# Patient Record
Sex: MALE | Race: BLACK OR AFRICAN AMERICAN | NOT HISPANIC OR LATINO | URBAN - METROPOLITAN AREA
[De-identification: names, ages, dates, MRNs, and addresses within clinical notes are randomized per-mention and may not be internally consistent; named-entity substitution may affect disease eponyms.]

---

## 2020-08-28 ENCOUNTER — INPATIENT (INPATIENT)
Facility: HOSPITAL | Age: 50
LOS: 7 days | Discharge: HOME | DRG: 871 | End: 2020-09-05
Attending: INTERNAL MEDICINE | Admitting: INTERNAL MEDICINE
Payer: COMMERCIAL

## 2020-08-28 VITALS
HEIGHT: 68 IN | SYSTOLIC BLOOD PRESSURE: 152 MMHG | HEART RATE: 101 BPM | OXYGEN SATURATION: 96 % | DIASTOLIC BLOOD PRESSURE: 95 MMHG | TEMPERATURE: 99 F | WEIGHT: 315 LBS | RESPIRATION RATE: 22 BRPM

## 2020-08-28 LAB
ALBUMIN SERPL ELPH-MCNC: 3.9 G/DL — SIGNIFICANT CHANGE UP (ref 3.3–5)
ALP SERPL-CCNC: 84 U/L — SIGNIFICANT CHANGE UP (ref 40–120)
ALT FLD-CCNC: 40 U/L — SIGNIFICANT CHANGE UP (ref 10–45)
ANION GAP SERPL CALC-SCNC: 13 MMOL/L — SIGNIFICANT CHANGE UP (ref 5–17)
ANISOCYTOSIS BLD QL: SIGNIFICANT CHANGE UP
APPEARANCE UR: CLEAR — SIGNIFICANT CHANGE UP
APTT BLD: 27.1 SEC — LOW (ref 27.5–35.5)
AST SERPL-CCNC: 27 U/L — SIGNIFICANT CHANGE UP (ref 10–40)
BACTERIA # UR AUTO: PRESENT /HPF
BASE EXCESS BLDV CALC-SCNC: 3 MMOL/L — SIGNIFICANT CHANGE UP
BASOPHILS # BLD AUTO: 0 K/UL — SIGNIFICANT CHANGE UP (ref 0–0.2)
BASOPHILS NFR BLD AUTO: 0 % — SIGNIFICANT CHANGE UP (ref 0–2)
BILIRUB SERPL-MCNC: 1 MG/DL — SIGNIFICANT CHANGE UP (ref 0.2–1.2)
BILIRUB UR-MCNC: NEGATIVE — SIGNIFICANT CHANGE UP
BUN SERPL-MCNC: 17 MG/DL — SIGNIFICANT CHANGE UP (ref 7–23)
CA-I SERPL-SCNC: 1.11 MMOL/L — LOW (ref 1.12–1.3)
CALCIUM SERPL-MCNC: 9.6 MG/DL — SIGNIFICANT CHANGE UP (ref 8.4–10.5)
CHLORIDE SERPL-SCNC: 87 MMOL/L — LOW (ref 96–108)
CK SERPL-CCNC: 155 U/L — SIGNIFICANT CHANGE UP (ref 30–200)
CO2 SERPL-SCNC: 29 MMOL/L — SIGNIFICANT CHANGE UP (ref 22–31)
COLOR SPEC: YELLOW — SIGNIFICANT CHANGE UP
CREAT SERPL-MCNC: 0.98 MG/DL — SIGNIFICANT CHANGE UP (ref 0.5–1.3)
CRP SERPL-MCNC: 11.88 MG/DL — HIGH (ref 0–0.4)
D DIMER BLD IA.RAPID-MCNC: 248 NG/ML DDU — HIGH
DIFF PNL FLD: ABNORMAL
EOSINOPHIL # BLD AUTO: 0 K/UL — SIGNIFICANT CHANGE UP (ref 0–0.5)
EOSINOPHIL NFR BLD AUTO: 0 % — SIGNIFICANT CHANGE UP (ref 0–6)
EPI CELLS # UR: SIGNIFICANT CHANGE UP /HPF (ref 0–5)
FERRITIN SERPL-MCNC: 80 NG/ML — SIGNIFICANT CHANGE UP (ref 30–400)
FIBRINOGEN PPP-MCNC: 810 MG/DL — HIGH (ref 258–438)
GAS PNL BLDV: 129 MMOL/L — LOW (ref 138–146)
GAS PNL BLDV: SIGNIFICANT CHANGE UP
GAS PNL BLDV: SIGNIFICANT CHANGE UP
GIANT PLATELETS BLD QL SMEAR: PRESENT — SIGNIFICANT CHANGE UP
GLUCOSE SERPL-MCNC: 151 MG/DL — HIGH (ref 70–99)
GLUCOSE UR QL: NEGATIVE — SIGNIFICANT CHANGE UP
HCO3 BLDV-SCNC: 30 MMOL/L — HIGH (ref 20–27)
HCT VFR BLD CALC: 36 % — LOW (ref 39–50)
HGB BLD-MCNC: 10.6 G/DL — LOW (ref 13–17)
HIV 1+2 AB+HIV1 P24 AG SERPL QL IA: SIGNIFICANT CHANGE UP
HYALINE CASTS # UR AUTO: SIGNIFICANT CHANGE UP /LPF (ref 0–2)
HYPOCHROMIA BLD QL: SLIGHT — SIGNIFICANT CHANGE UP
INR BLD: 1.33 — HIGH (ref 0.88–1.16)
KETONES UR-MCNC: NEGATIVE — SIGNIFICANT CHANGE UP
LACTATE SERPL-SCNC: 1.8 MMOL/L — SIGNIFICANT CHANGE UP (ref 0.5–2)
LDH SERPL L TO P-CCNC: 248 U/L — HIGH (ref 50–242)
LEGIONELLA AG UR QL: NEGATIVE — SIGNIFICANT CHANGE UP
LEUKOCYTE ESTERASE UR-ACNC: NEGATIVE — SIGNIFICANT CHANGE UP
LYMPHOCYTES # BLD AUTO: 1.2 K/UL — SIGNIFICANT CHANGE UP (ref 1–3.3)
LYMPHOCYTES # BLD AUTO: 6.1 % — LOW (ref 13–44)
MANUAL SMEAR VERIFICATION: SIGNIFICANT CHANGE UP
MCHC RBC-ENTMCNC: 20.3 PG — LOW (ref 27–34)
MCHC RBC-ENTMCNC: 29.4 GM/DL — LOW (ref 32–36)
MCV RBC AUTO: 68.8 FL — LOW (ref 80–100)
MICROCYTES BLD QL: SLIGHT — SIGNIFICANT CHANGE UP
MONOCYTES # BLD AUTO: 1.03 K/UL — HIGH (ref 0–0.9)
MONOCYTES NFR BLD AUTO: 5.2 % — SIGNIFICANT CHANGE UP (ref 2–14)
NEUTROPHILS # BLD AUTO: 17.5 K/UL — HIGH (ref 1.8–7.4)
NEUTROPHILS NFR BLD AUTO: 88.7 % — HIGH (ref 43–77)
NITRITE UR-MCNC: NEGATIVE — SIGNIFICANT CHANGE UP
NT-PROBNP SERPL-SCNC: 2598 PG/ML — HIGH (ref 0–300)
OSMOLALITY SERPL: 274 MOSM/KG — LOW (ref 275–300)
OSMOLALITY UR: 464 MOSM/KG — SIGNIFICANT CHANGE UP (ref 300–900)
OVALOCYTES BLD QL SMEAR: SLIGHT — SIGNIFICANT CHANGE UP
PCO2 BLDV: 59 MMHG — HIGH (ref 41–51)
PH BLDV: 7.33 — SIGNIFICANT CHANGE UP (ref 7.32–7.43)
PH UR: 6 — SIGNIFICANT CHANGE UP (ref 5–8)
PLAT MORPH BLD: ABNORMAL
PLATELET # BLD AUTO: 431 K/UL — HIGH (ref 150–400)
PO2 BLDV: 48 MMHG — SIGNIFICANT CHANGE UP
POLYCHROMASIA BLD QL SMEAR: SLIGHT — SIGNIFICANT CHANGE UP
POTASSIUM BLDV-SCNC: 3.3 MMOL/L — LOW (ref 3.5–4.9)
POTASSIUM SERPL-MCNC: 3.6 MMOL/L — SIGNIFICANT CHANGE UP (ref 3.5–5.3)
POTASSIUM SERPL-SCNC: 3.6 MMOL/L — SIGNIFICANT CHANGE UP (ref 3.5–5.3)
PROCALCITONIN SERPL-MCNC: 0.12 NG/ML — HIGH (ref 0.02–0.1)
PROT SERPL-MCNC: 7.8 G/DL — SIGNIFICANT CHANGE UP (ref 6–8.3)
PROT UR-MCNC: 100 MG/DL
PROTHROM AB SERPL-ACNC: 15.7 SEC — HIGH (ref 10.6–13.6)
RAPID RVP RESULT: SIGNIFICANT CHANGE UP
RBC # BLD: 5.23 M/UL — SIGNIFICANT CHANGE UP (ref 4.2–5.8)
RBC # FLD: 20.5 % — HIGH (ref 10.3–14.5)
RBC BLD AUTO: ABNORMAL
RBC CASTS # UR COMP ASSIST: < 5 /HPF — SIGNIFICANT CHANGE UP
SAO2 % BLDV: 69 % — SIGNIFICANT CHANGE UP
SARS-COV-2 RNA SPEC QL NAA+PROBE: SIGNIFICANT CHANGE UP
SODIUM SERPL-SCNC: 129 MMOL/L — LOW (ref 135–145)
SODIUM UR-SCNC: 56 MMOL/L — SIGNIFICANT CHANGE UP
SP GR SPEC: 1.02 — SIGNIFICANT CHANGE UP (ref 1–1.03)
SPHEROCYTES BLD QL SMEAR: SLIGHT — SIGNIFICANT CHANGE UP
TROPONIN T SERPL-MCNC: 0.03 NG/ML — HIGH (ref 0–0.01)
UROBILINOGEN FLD QL: 0.2 E.U./DL — SIGNIFICANT CHANGE UP
WBC # BLD: 19.73 K/UL — HIGH (ref 3.8–10.5)
WBC # FLD AUTO: 19.73 K/UL — HIGH (ref 3.8–10.5)
WBC UR QL: ABNORMAL /HPF

## 2020-08-28 PROCEDURE — 93308 TTE F-UP OR LMTD: CPT | Mod: 26

## 2020-08-28 PROCEDURE — 71275 CT ANGIOGRAPHY CHEST: CPT | Mod: 26

## 2020-08-28 PROCEDURE — 71045 X-RAY EXAM CHEST 1 VIEW: CPT | Mod: 26

## 2020-08-28 PROCEDURE — 99291 CRITICAL CARE FIRST HOUR: CPT

## 2020-08-28 PROCEDURE — 99253 IP/OBS CNSLTJ NEW/EST LOW 45: CPT

## 2020-08-28 PROCEDURE — 93010 ELECTROCARDIOGRAM REPORT: CPT

## 2020-08-28 PROCEDURE — 71045 X-RAY EXAM CHEST 1 VIEW: CPT | Mod: 26,77

## 2020-08-28 RX ORDER — ENOXAPARIN SODIUM 100 MG/ML
40 INJECTION SUBCUTANEOUS EVERY 12 HOURS
Refills: 0 | Status: DISCONTINUED | OUTPATIENT
Start: 2020-08-28 | End: 2020-09-02

## 2020-08-28 RX ORDER — ENOXAPARIN SODIUM 100 MG/ML
40 INJECTION SUBCUTANEOUS EVERY 24 HOURS
Refills: 0 | Status: DISCONTINUED | OUTPATIENT
Start: 2020-08-28 | End: 2020-08-28

## 2020-08-28 RX ORDER — AZITHROMYCIN 500 MG/1
500 TABLET, FILM COATED ORAL ONCE
Refills: 0 | Status: COMPLETED | OUTPATIENT
Start: 2020-08-28 | End: 2020-08-28

## 2020-08-28 RX ORDER — FUROSEMIDE 40 MG
40 TABLET ORAL ONCE
Refills: 0 | Status: COMPLETED | OUTPATIENT
Start: 2020-08-28 | End: 2020-08-28

## 2020-08-28 RX ORDER — AZITHROMYCIN 500 MG/1
500 TABLET, FILM COATED ORAL EVERY 24 HOURS
Refills: 0 | Status: COMPLETED | OUTPATIENT
Start: 2020-08-29 | End: 2020-08-31

## 2020-08-28 RX ORDER — ACETAMINOPHEN 500 MG
650 TABLET ORAL ONCE
Refills: 0 | Status: COMPLETED | OUTPATIENT
Start: 2020-08-28 | End: 2020-08-28

## 2020-08-28 RX ORDER — CEFTRIAXONE 500 MG/1
1000 INJECTION, POWDER, FOR SOLUTION INTRAMUSCULAR; INTRAVENOUS EVERY 24 HOURS
Refills: 0 | Status: COMPLETED | OUTPATIENT
Start: 2020-08-29 | End: 2020-09-02

## 2020-08-28 RX ORDER — CEFTRIAXONE 500 MG/1
1000 INJECTION, POWDER, FOR SOLUTION INTRAMUSCULAR; INTRAVENOUS ONCE
Refills: 0 | Status: COMPLETED | OUTPATIENT
Start: 2020-08-28 | End: 2020-08-28

## 2020-08-28 RX ORDER — CHLORHEXIDINE GLUCONATE 213 G/1000ML
1 SOLUTION TOPICAL
Refills: 0 | Status: DISCONTINUED | OUTPATIENT
Start: 2020-08-28 | End: 2020-08-30

## 2020-08-28 RX ADMIN — ENOXAPARIN SODIUM 40 MILLIGRAM(S): 100 INJECTION SUBCUTANEOUS at 19:14

## 2020-08-28 RX ADMIN — CEFTRIAXONE 100 MILLIGRAM(S): 500 INJECTION, POWDER, FOR SOLUTION INTRAMUSCULAR; INTRAVENOUS at 15:02

## 2020-08-28 RX ADMIN — Medication 650 MILLIGRAM(S): at 19:14

## 2020-08-28 RX ADMIN — Medication 40 MILLIGRAM(S): at 19:49

## 2020-08-28 RX ADMIN — AZITHROMYCIN 255 MILLIGRAM(S): 500 TABLET, FILM COATED ORAL at 15:57

## 2020-08-28 RX ADMIN — Medication 650 MILLIGRAM(S): at 14:13

## 2020-08-28 RX ADMIN — CEFTRIAXONE 1000 MILLIGRAM(S): 500 INJECTION, POWDER, FOR SOLUTION INTRAMUSCULAR; INTRAVENOUS at 16:15

## 2020-08-28 RX ADMIN — Medication 40 MILLIGRAM(S): at 16:15

## 2020-08-28 NOTE — CONSULT NOTE ADULT - ASSESSMENT
ASSESSMENT:   50M PMH HTN, cardiac murmur presented initially with acute shortness of breath and noted hypoxia at PCP for which admitted to MICU for respiratory failure 2/2 to PNA- r/o COVID in setting of significant b/l infiltrates, noted pulm edema. Patient on HFNC and undergoing IV diuresis and Tx with IV abx. Cardiology consulted for reduced EF 30-35% in setting of unknown hx of CHF.     PLAN:   # Reduced Ejection fraction 30-35%, Decompensated CHF  Limited TTE (8/28): No significant valvular disease, trace tricuspid regurgitation, PASP 76 mmHg, Right ventricular size cannot be determined with accuracy, RV probably dilated. Right ventricular systolic function is reduced, TAPSE 14.00 mm (normal >=17 mm). RV tissue Doppler S' is 16.00 cm/s (normal >10 cm/s), Left ventricular endocardium is not well visualized. Grossly, left ventricular function appears moderate to severely reduced with a calculated ejection fraction of 30-35% with global hypokinesis, Trivial pericardial effusion  Exam: Mildly dilated neck veins, LE edema and b/l crackles  - Continue with IV diuresis with strict I/O and daily standing weights  - Please trend cardiac enzymes to peak  - Please obtain daily EKGs      Pending discussion with Consult attending and results are final with attending signature.

## 2020-08-28 NOTE — H&P ADULT - HISTORY OF PRESENT ILLNESS
49 y/o M with hx of HTN and chronic LE edema , and cardiac murmur (since childhood), that presents from his outpatient PCP office for hypoxia and SOB. Pt states for the past 1-2 weeks he has been experiencing anxiety which has resulted in insomnia. He reached out this sister/cousin who is a nurse who recommended to take Benadryl to help with sleep. Pt ingested benadryl on 8/26 and states the following day developed SOB , increased anxiety, and trouble breathing. Pt denies any tobacco, vape, or drug use. States he has no pets at home. Has been quarantining during the pandemic and has no sick contacts. 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 Of note pt states he tried CBD oil 2 weeks ago which was a droplet on tongue but hasnt taken it in 1 week and takes supplements of yisel, turmeric, and moringa. Otherwise Denies any CP, abdominal pain,  N/V, Diarrhea, F, or chills.     In the ED T: 100.1 Rectal HR: 101-> 95 BP: 152/95 RR: 22 Saturating 79% on RA and 96% on NRB . Labs remarkable for WBC 19.73, Hgb 10.6, Plt 431, Na 129, Cl 87, CRP 11.88,  Procalcitonin .12, Trop .03, proBNP > 2000, . VBG demonstrating CXR demonstrating bilateral pulmonary infiltrate/edema. ICU consulted for hypoxic respiratory failure and r/o COVID.     PMH: HTN, Cardiac Murmur

## 2020-08-28 NOTE — ED ADULT TRIAGE NOTE - OTHER COMPLAINTS
SOB, mild cough, SUE, palpitations since wednesday. [id number] room air, on nonrebreather. sent in by [facility]. given 1 aspirin and nitro via ema.

## 2020-08-28 NOTE — ED PROVIDER NOTE - CARE PLAN
Principal Discharge DX:	Pneumonia  Secondary Diagnosis:	Hypoxia Principal Discharge DX:	Shortness of breath  Secondary Diagnosis:	Hypoxia

## 2020-08-28 NOTE — ED ADULT NURSE NOTE - OTHER COMPLAINTS
SOB, mild cough, SUE, palpitations since wednesday. [id number] room air, on nonrebreather. sent in by [facility]. given 1 aspirin and nitro via ema.

## 2020-08-28 NOTE — ED PROVIDER NOTE - CLINICAL SUMMARY MEDICAL DECISION MAKING FREE TEXT BOX
49 y/o M with HTN here with 3 days of SOB and cough, presents to the ED after low O2 sat noted at . Here with low grade fevers (100.2 on rectal exam) and pulse ox 78% on RA with good waveform. Plan for full COVID19 workup including labs, CXR. CXR with b/l infiltrates, suspicious for COVID. Patient placed on O2, will observe and consult ICU 49 y/o M with PMH of HTN here with 3 days of SOB, palpitations and cough presents to the ED after low O2 sats were noted in [facility]. Here with low grade fever (100.2 on rectally) and pulse ox 78% on RA with good waveform with tachypnea and diaphoresis. Denies CP. Placed on NC with O2 sats in high 80s and switched to NRB with O2 sats improving mid/ high 90s. Plan for cardiac w/up / COVID19 workup including labs/ CXR. CXR with cardiomegaly and b/l opacifications. Given IV Ceftriaxone and Azithromax. Labs/ studies noted. Pt with elevated WBC with shift and elevated BNP and trop. Covid pending. MICU consulted and in ED to see pt. Pt admitted to MICU.

## 2020-08-28 NOTE — CONSULT NOTE ADULT - SUBJECTIVE AND OBJECTIVE BOX
[facility] SERVICE CONSULTATION NOTE    CC:    HPI:  49 y/o M with hx of HTN and chronic LE edema , and cardiac murmur (since childhood), that presents from his outpatient PCP office for hypoxia and SOB. Pt states for the past 1-2 weeks he has been experiencing anxiety which has resulted in insomnia. He reached out this sister/cousin who is a nurse who recommended to take Benadryl to help with sleep. Pt ingested benadryl on 8/26 and states the following day developed SOB , increased anxiety, and trouble breathing. Pt denies any tobacco, vape, or drug use. States he has no pets at home. Has been quarantining during the pandemic and has no sick contacts. 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     In the ED T: 100.1 Rectal HR: 101-> 95 BP: 152/95 RR: 22 Saturating 79% on RA and 96% on NRB . Labs remarkable for WBC 19.73, Hgb 10.6, Plt 431, Na 129, Cl 87, CRP 11.88,  Procalcitonin CXR demonstrating CT head demonstrating. Pt received IV Pt admitted to [facility] for     ROS:  Otherwise negative, except as specified in HPI.    PMH:      PSH:    FH:    SH:    ALLERGIES:    MEDICATIONS:    VITAL SIGNS:  ICU Vital Signs Last 24 Hrs  T(C): 37.4 (28 Aug 2020 14:44), Max: 37.8 (28 Aug 2020 13:50)  T(F): 99.4 (28 Aug 2020 14:44), Max: 100.1 (28 Aug 2020 13:50)  HR: 95 (28 Aug 2020 14:44) (95 - 108)  BP: 163/91 (28 Aug 2020 14:44) (152/95 - 163/91)  BP(mean): --  ABP: --  ABP(mean): --  RR: 22 (28 Aug 2020 14:44) (22 - 22)  SpO2: 98% (28 Aug 2020 14:44) (79% - 98%)    CAPILLARY BLOOD GLUCOSE          PHYSICAL EXAM:  Constitutional: resting comfortably in bed, NAD  HEENT: NC/AT; PERRL, anicteric sclera; no oropharyngeal erythema or exudates; MMM  Neck: supple, no appreciable JVD  Respiratory: CTA B/L, no W/R/R; respirations appear non-labored, conversive in full sentences  Cardiovascular: +S1/S2, RRR  Gastrointestinal: abdomen soft, NT/ND  Extremities: WWP; no clubbing, cyanosis or edema  Vascular: 2+ radial, femoral, and DP/PT pulses B/L  Dermatologic: skin normal color and turgor; no visible rashes  Neurological:     LABS:                        10.6   19.73 )-----------( 431      ( 28 Aug 2020 14:16 )             36.0     08-28    129<L>  |  87<L>  |  17  ----------------------------<  151<H>  3.6   |  29  |  0.98    Ca    9.6      28 Aug 2020 14:16    TPro  7.8  /  Alb  3.9  /  TBili  1.0  /  DBili  x   /  AST  27  /  ALT  40  /  AlkPhos  84  08-28    PT/INR - ( 28 Aug 2020 14:16 )   PT: 15.7 sec;   INR: 1.33          PTT - ( 28 Aug 2020 14:16 )  PTT:27.1 sec  Lactate, Blood: 1.8 mmol/L (08-28-20 @ 14:16)    CARDIAC MARKERS ( 28 Aug 2020 14:16 )  x     / 0.03 ng/mL / 155 U/L / x     / x            Blood Gas Profile w/Lytes - Venous: Performed in Lab (08-28-20 @ 14:08)      EKG: Reviewed.    RADIOLOGY & ADDITIONAL TESTS: Reviewed. [facility] SERVICE CONSULTATION NOTE    CC:    HPI:  51 y/o M with hx of HTN and chronic LE edema , and cardiac murmur (since childhood), that presents from his outpatient PCP office for hypoxia and SOB. Pt states for the past 1-2 weeks he has been experiencing anxiety which has resulted in insomnia. He reached out this sister/cousin who is a nurse who recommended to take Benadryl to help with sleep. Pt ingested benadryl on 8/26 and states the following day developed SOB , increased anxiety, and trouble breathing. Pt denies any tobacco, vape, or drug use. States he has no pets at home. Has been quarantining during the pandemic and has no sick contacts. 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 Otherwise Denies any CP, abdominal pain,  N/V, Diarrhea, F, or chills.     In the ED T: 100.1 Rectal HR: 101-> 95 BP: 152/95 RR: 22 Saturating 79% on RA and 96% on NRB . Labs remarkable for WBC 19.73, Hgb 10.6, Plt 431, Na 129, Cl 87, CRP 11.88,  Procalcitonin .12, Trop .03, proBNP > 2000, . VBG demonstrating CXR demonstrating bilateral pulmonary infiltrate/edema. ICU consulted for hypoxic respiratory failure and r/o COVID.       ROS:  Otherwise negative, except as specified in HPI.    PMH:   HTN  Cardiac Murmur      PSH: None    FH: Diabetes    SH: No tobacco, no drugs, social EtOH     ALLERGIES: NKDA    MEDICATIONS:  Valsartan/HCTZ     VITAL SIGNS:  ICU Vital Signs Last 24 Hrs  T(C): 37.4 (28 Aug 2020 14:44), Max: 37.8 (28 Aug 2020 13:50)  T(F): 99.4 (28 Aug 2020 14:44), Max: 100.1 (28 Aug 2020 13:50)  HR: 95 (28 Aug 2020 14:44) (95 - 108)  BP: 163/91 (28 Aug 2020 14:44) (152/95 - 163/91)  BP(mean): --  ABP: --  ABP(mean): --  RR: 22 (28 Aug 2020 14:44) (22 - 22)  SpO2: 98% (28 Aug 2020 14:44) (79% - 98%)    CAPILLARY BLOOD GLUCOSE          PHYSICAL EXAM:  Constitutional: resting comfortably in bed, NAD  HEENT: NC/AT; PERRL, anicteric sclera; no oropharyngeal erythema or exudates; MMM  Neck: supple, no appreciable JVD  Respiratory: CTA B/L, no W/R/R; respirations appear non-labored, conversive in full sentences  Cardiovascular: +S1/S2, RRR  Gastrointestinal: abdomen soft, NT/ND  Extremities: WWP; no clubbing, cyanosis or edema  Vascular: 2+ radial, femoral, and DP/PT pulses B/L  Dermatologic: skin normal color and turgor; no visible rashes  Neurological:     LABS:                        10.6   19.73 )-----------( 431      ( 28 Aug 2020 14:16 )             36.0     08-28    129<L>  |  87<L>  |  17  ----------------------------<  151<H>  3.6   |  29  |  0.98    Ca    9.6      28 Aug 2020 14:16    TPro  7.8  /  Alb  3.9  /  TBili  1.0  /  DBili  x   /  AST  27  /  ALT  40  /  AlkPhos  84  08-28    PT/INR - ( 28 Aug 2020 14:16 )   PT: 15.7 sec;   INR: 1.33          PTT - ( 28 Aug 2020 14:16 )  PTT:27.1 sec  Lactate, Blood: 1.8 mmol/L (08-28-20 @ 14:16)    CARDIAC MARKERS ( 28 Aug 2020 14:16 )  x     / 0.03 ng/mL / 155 U/L / x     / x            Blood Gas Profile w/Lytes - Venous: Performed in Lab (08-28-20 @ 14:08)      EKG: Reviewed.    RADIOLOGY & ADDITIONAL TESTS: Reviewed. [facility] SERVICE CONSULTATION NOTE    CC:    HPI:  49 y/o M with hx of HTN and chronic LE edema , and cardiac murmur (since childhood), that presents from his outpatient PCP office for hypoxia and SOB. Pt states for the past 1-2 weeks he has been experiencing anxiety which has resulted in insomnia. He reached out this sister/cousin who is a nurse who recommended to take Benadryl to help with sleep. Pt ingested benadryl on 8/26 and states the following day developed SOB , increased anxiety, and trouble breathing. Pt denies any tobacco, vape, or drug use. States he has no pets at home. Has been quarantining during the pandemic and has no sick contacts. 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 Otherwise Denies any CP, abdominal pain,  N/V, Diarrhea, F, or chills.     In the ED T: 100.1 Rectal HR: 101-> 95 BP: 152/95 RR: 22 Saturating 79% on RA and 96% on NRB . Labs remarkable for WBC 19.73, Hgb 10.6, Plt 431, Na 129, Cl 87, CRP 11.88,  Procalcitonin .12, Trop .03, proBNP > 2000, . VBG demonstrating CXR demonstrating bilateral pulmonary infiltrate/edema. ICU consulted for hypoxic respiratory failure and r/o COVID.       ROS:  Otherwise negative, except as specified in HPI.    PMH:   HTN  Cardiac Murmur      PSH: None    FH: Diabetes    SH: No tobacco, no drugs, social EtOH     ALLERGIES: NKDA    MEDICATIONS:  Valsartan/HCTZ     VITAL SIGNS:  ICU Vital Signs Last 24 Hrs  T(C): 37.4 (28 Aug 2020 14:44), Max: 37.8 (28 Aug 2020 13:50)  T(F): 99.4 (28 Aug 2020 14:44), Max: 100.1 (28 Aug 2020 13:50)  HR: 95 (28 Aug 2020 14:44) (95 - 108)  BP: 163/91 (28 Aug 2020 14:44) (152/95 - 163/91)  BP(mean): --  ABP: --  ABP(mean): --  RR: 22 (28 Aug 2020 14:44) (22 - 22)  SpO2: 98% (28 Aug 2020 14:44) (79% - 98%)    CAPILLARY BLOOD GLUCOSE          PHYSICAL EXAM:  Constitutional:  male, resting  in bed, NAD  HEENT: NC/AT; PERRL, anicteric sclera; no oropharyngeal erythema or exudates; MMM  Neck: supple, no appreciable JVD  Respiratory: decreased breath sounds bilaterally although difficult to auscultate due to body habitus   Cardiovascular: +S1/S2, RRR  Gastrointestinal: abdomen soft, NT/ND  Extremities: WWP; no clubbing, cyanosis, 2+ pitting edema up to knee of b/l LE   Vascular: 2+ radial, femoral, and DP/PT pulses B/L  Dermatologic: skin normal color and turgor; no visible rashes  Neurological: AOX3, no focal deficits     LABS:                        10.6   19.73 )-----------( 431      ( 28 Aug 2020 14:16 )             36.0     08-28    129<L>  |  87<L>  |  17  ----------------------------<  151<H>  3.6   |  29  |  0.98    Ca    9.6      28 Aug 2020 14:16    TPro  7.8  /  Alb  3.9  /  TBili  1.0  /  DBili  x   /  AST  27  /  ALT  40  /  AlkPhos  84  08-28    PT/INR - ( 28 Aug 2020 14:16 )   PT: 15.7 sec;   INR: 1.33          PTT - ( 28 Aug 2020 14:16 )  PTT:27.1 sec  Lactate, Blood: 1.8 mmol/L (08-28-20 @ 14:16)    CARDIAC MARKERS ( 28 Aug 2020 14:16 )  x     / 0.03 ng/mL / 155 U/L / x     / x            Blood Gas Profile w/Lytes - Venous: Performed in Lab (08-28-20 @ 14:08)      EKG: Reviewed.    RADIOLOGY & ADDITIONAL TESTS: Reviewed. [facility] SERVICE CONSULTATION NOTE    CC:    HPI:  49 y/o M with hx of HTN and chronic LE edema , and cardiac murmur (since childhood), that presents from his outpatient PCP office for hypoxia and SOB. Pt states for the past 1-2 weeks he has been experiencing anxiety which has resulted in insomnia. He reached out this sister/cousin who is a nurse who recommended to take Benadryl to help with sleep. Pt ingested benadryl on 8/26 and states the following day developed SOB , increased anxiety, and trouble breathing. Pt denies any tobacco, vape, or drug use. States he has no pets at home. Has been quarantining during the pandemic and has no sick contacts. 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 Otherwise Denies any CP, abdominal pain,  N/V, Diarrhea, F, or chills.     In the ED T: 100.1 Rectal HR: 101-> 95 BP: 152/95 RR: 22 Saturating 79% on RA and 96% on NRB . Labs remarkable for WBC 19.73, Hgb 10.6, Plt 431, Na 129, Cl 87, CRP 11.88,  Procalcitonin .12, Trop .03, proBNP > 2000, . VBG demonstrating CXR demonstrating bilateral pulmonary infiltrate/edema. ICU consulted for hypoxic respiratory failure and r/o COVID.       ROS:  Otherwise negative, except as specified in HPI.    PMH:   HTN  Cardiac Murmur      PSH: None    FH: Diabetes    SH: No tobacco, no drugs, social EtOH     ALLERGIES: NKDA    MEDICATIONS:  Valsartan/HCTZ     VITAL SIGNS:  ICU Vital Signs Last 24 Hrs  T(C): 37.4 (28 Aug 2020 14:44), Max: 37.8 (28 Aug 2020 13:50)  T(F): 99.4 (28 Aug 2020 14:44), Max: 100.1 (28 Aug 2020 13:50)  HR: 95 (28 Aug 2020 14:44) (95 - 108)  BP: 163/91 (28 Aug 2020 14:44) (152/95 - 163/91)  BP(mean): --  ABP: --  ABP(mean): --  RR: 22 (28 Aug 2020 14:44) (22 - 22)  SpO2: 98% (28 Aug 2020 14:44) (79% - 98%)    CAPILLARY BLOOD GLUCOSE          PHYSICAL EXAM:  Constitutional:  male, resting  in bed, NAD  HEENT: NC/AT; PERRL, anicteric sclera; no oropharyngeal erythema or exudates; MMM  Neck: supple, no appreciable JVD  Respiratory: decreased breath sounds bilaterally although difficult to auscultate due to body habitus   Cardiovascular: +S1/S2, RRR  Gastrointestinal: obese abdomen soft, NT/ND  Extremities: WWP; no clubbing, cyanosis, 2+ pitting edema up to knee of b/l LE   Vascular: 2+ radial, femoral, and DP/PT pulses B/L  Dermatologic: skin normal color and turgor; no visible rashes  Neurological: AOX3, no focal deficits     LABS:                        10.6   19.73 )-----------( 431      ( 28 Aug 2020 14:16 )             36.0     08-28    129<L>  |  87<L>  |  17  ----------------------------<  151<H>  3.6   |  29  |  0.98    Ca    9.6      28 Aug 2020 14:16    TPro  7.8  /  Alb  3.9  /  TBili  1.0  /  DBili  x   /  AST  27  /  ALT  40  /  AlkPhos  84  08-28    PT/INR - ( 28 Aug 2020 14:16 )   PT: 15.7 sec;   INR: 1.33          PTT - ( 28 Aug 2020 14:16 )  PTT:27.1 sec  Lactate, Blood: 1.8 mmol/L (08-28-20 @ 14:16)    CARDIAC MARKERS ( 28 Aug 2020 14:16 )  x     / 0.03 ng/mL / 155 U/L / x     / x            Blood Gas Profile w/Lytes - Venous: Performed in Lab (08-28-20 @ 14:08)      EKG: Reviewed.    RADIOLOGY & ADDITIONAL TESTS: Reviewed. [facility] SERVICE CONSULTATION NOTE    CC:    HPI:  51 y/o M with hx of HTN and chronic LE edema , and cardiac murmur (since childhood), that presents from his outpatient PCP office for hypoxia and SOB. Pt states for the past 1-2 weeks he has been experiencing anxiety which has resulted in insomnia. He reached out this sister/cousin who is a nurse who recommended to take Benadryl to help with sleep. Pt ingested benadryl on 8/26 and states the following day developed SOB , increased anxiety, and trouble breathing. Pt denies any tobacco, vape, or drug use. States he has no pets at home. Has been quarantining during the pandemic and has no sick contacts. 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 Otherwise Denies any CP, abdominal pain,  N/V, Diarrhea, F, or chills.     In the ED T: 100.1 Rectal HR: 101-> 95 BP: 152/95 RR: 22 Saturating 79% on RA and 96% on NRB . Labs remarkable for WBC 19.73, Hgb 10.6, Plt 431, Na 129, Cl 87, CRP 11.88,  Procalcitonin .12, Trop .03, proBNP > 2000, . VBG demonstrating CXR demonstrating bilateral pulmonary infiltrate/edema. ICU consulted for hypoxic respiratory failure and r/o COVID.       ROS:  Otherwise negative, except as specified in HPI.    PMH:   HTN  Cardiac Murmur      PSH: None    FH: Diabetes    SH: No tobacco, no drugs, social EtOH     ALLERGIES: NKDA    MEDICATIONS:  Valsartan/HCTZ     VITAL SIGNS:  ICU Vital Signs Last 24 Hrs  T(C): 37.4 (28 Aug 2020 14:44), Max: 37.8 (28 Aug 2020 13:50)  T(F): 99.4 (28 Aug 2020 14:44), Max: 100.1 (28 Aug 2020 13:50)  HR: 95 (28 Aug 2020 14:44) (95 - 108)  BP: 163/91 (28 Aug 2020 14:44) (152/95 - 163/91)  BP(mean): --  ABP: --  ABP(mean): --  RR: 22 (28 Aug 2020 14:44) (22 - 22)  SpO2: 98% (28 Aug 2020 14:44) (79% - 98%)    CAPILLARY BLOOD GLUCOSE          PHYSICAL EXAM:  Constitutional:  male, resting  in bed, NAD  HEENT: NC/AT; PERRL, anicteric sclera; no oropharyngeal erythema or exudates; MMM  Neck: supple, no appreciable JVD  Respiratory: decreased breath sounds bilaterally although difficult to auscultate due to body habitus   Cardiovascular: +S1/S2, RRR  Gastrointestinal: obese abdomen soft, NT/ND  Extremities: WWP; no clubbing, cyanosis, 2+ pitting edema up to knee of b/l LE   Vascular: 2+ radial, femoral, and DP/PT pulses B/L  Dermatologic: skin normal color and turgor; no visible rashes  Neurological: AOX3, no focal deficits     LABS:                        10.6   19.73 )-----------( 431      ( 28 Aug 2020 14:16 )             36.0     08-28    129<L>  |  87<L>  |  17  ----------------------------<  151<H>  3.6   |  29  |  0.98    Ca    9.6      28 Aug 2020 14:16    TPro  7.8  /  Alb  3.9  /  TBili  1.0  /  DBili  x   /  AST  27  /  ALT  40  /  AlkPhos  84  08-28    PT/INR - ( 28 Aug 2020 14:16 )   PT: 15.7 sec;   INR: 1.33          PTT - ( 28 Aug 2020 14:16 )  PTT:27.1 sec  Lactate, Blood: 1.8 mmol/L (08-28-20 @ 14:16)    CARDIAC MARKERS ( 28 Aug 2020 14:16 )  x     / 0.03 ng/mL / 155 U/L / x     / x            Blood Gas Profile w/Lytes - Venous: Performed in Lab (08-28-20 @ 14:08)      EKG: Reviewed.    RADIOLOGY & ADDITIONAL TESTS: Reviewed.

## 2020-08-28 NOTE — H&P ADULT - ASSESSMENT
51 y/o M with hx of HTN and chronic LE edema , and cardiac murmur (since childhood), that presents from his outpatient PCP office for hypoxia and SOB. ICU consulted for hypoxic respiratory failure and r/o COVID.     PULMONARY   #Hypoxic Respiratory Failure  Pt presents with tachypnea and hypoxia to 70s on RA with improvement to 96% on NRB. CXR demonstrating bilateral pulmonary infiltrates vs edema.   - will initiate BiPAP   - wean supplemental O2 as tolerated   - admit to intensive care unit     #Sepsis   Pt with tachypnea, tachycardia, and WBC > 19, CRP 11.88 with possible source being pulmonary.   - c/w Ceftriaxone and Azithromycin to cover CAP  - order HIV test (consented)   - f/u blood cx  - f/u urine cx  - f/u urine legionella and strep Ag  - hold off on fluid resuscitation at this time due to possible fluid overload state.     #r/o COVID  Labs not completely consistent with covid  - f/u COVID PCR swab  - treat hypoxic respiratory failure as above     #Pulmonary Edema   CXR with bilateral pulmonary edema, with b/l LE edema, elevated proBNP and POCUS demonstrating B-lines. 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  - given 1 dose of Lasix 40mg   - will consider standing Lasix 40mg if improving   - f/u formal echocardiogram   Pt with hx of HFEF, most recent echo demonstrating EF %. On exam . Labs remarkable for .   - strict I/Os, monitor UOP carefully    CARDIOVSCULAR  #Hypertension hx  - Hold home valsartan hctz 320-25 combo    RENAL  #Hyponatremia  Pt with hyponatrmiea , likely hypervolemic hyponatremia evidenced by pulmonary edema and b/l LE edema    - trend BMP  - f/u serum osmolality   - f/u urine osmolality and Na    HEME  #Leukocytosis: most likely 2/2 infection vs. reactive  - Continue to trend CBC    F: Hold off due to fluid overloaded state  E: Replete K<4. Mg<2  N: NPO  Ppx: Lovenox  Dispo: ICU  CODE: FULL CODE     LINES AND DRAINS  LAST T&S: 49 y/o M with hx of HTN and chronic LE edema , and cardiac murmur (since childhood), that presents from his outpatient PCP office for hypoxia and SOB. ICU consulted for hypoxic respiratory failure and r/o COVID.     PULMONARY   #Hypoxic Respiratory Failure  Pt presents with tachypnea and hypoxia to 70s on RA with improvement to 96% on NRB. CXR demonstrating bilateral pulmonary infiltrates vs edema.   - will initiate BiPAP   - wean supplemental O2 as tolerated   - admit to intensive care unit     #Sepsis   Pt with tachypnea, tachycardia, and WBC > 19, CRP 11.88 with possible source being pulmonary.   - c/w Ceftriaxone and Azithromycin to cover CAP  - order HIV test (consented)   - f/u blood cx  - f/u urine cx  - f/u urine legionella and strep Ag  - hold off on fluid resuscitation at this time due to possible fluid overload state.     #r/o COVID  Labs not completely consistent with covid  - f/u COVID PCR swab  - treat hypoxic respiratory failure as above     #Pulmonary Edema   CXR with bilateral pulmonary edema, with b/l LE edema, elevated proBNP and POCUS demonstrating B-lines. 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  - given 1 dose of Lasix 40mg   - will consider standing Lasix 40mg if improving   - f/u formal echocardiogram   Pt with hx of HFEF, most recent echo demonstrating EF %. On exam . Labs remarkable for .   - strict I/Os, monitor UOP carefully    CARDIOVSCULAR  #Hypertension hx  - At home takes valsartan hctz 320-25 combo    RENAL  #Hyponatremia  Pt with hyponatrmiea , likely hypervolemic hyponatremia evidenced by pulmonary edema and b/l LE edema    - trend BMP  - f/u serum osmolality   - f/u urine osmolality and Na    HEME  #Leukocytosis: most likely 2/2 infection vs. reactive  - Continue to trend CBC    F: Hold off due to fluid overloaded state  E: Replete K<4. Mg<2  N: NPO  Ppx: Lovenox  Dispo: ICU  CODE: FULL CODE     LINES AND DRAINS  LAST T&S:

## 2020-08-28 NOTE — ED ADULT NURSE REASSESSMENT NOTE - NS ED NURSE REASSESS COMMENT FT1
Pt on BiPAP at this time, tolerating well. Pt reports "I feel much better." Will continue to monitor.

## 2020-08-28 NOTE — ED ADULT NURSE NOTE - OBJECTIVE STATEMENT
Pt reports sob and palpitations starting on Wednesday, denies chest pain, dizziness, weakness. Pt also reports dry cough and "I also have edema." Pt denies chills, fever at home.

## 2020-08-28 NOTE — ED ADULT NURSE NOTE - IN THE PAST 12 MONTHS HAVE YOU USED DRUGS OTHER THAN THOSE REQUIRED FOR MEDICAL REASON?
Is This A New Presentation, Or A Follow-Up?: Moles
How Severe Is Your Skin Lesion?: mild
Has Your Skin Lesion Been Treated?: not been treated
No

## 2020-08-28 NOTE — ED PROVIDER NOTE - OBJECTIVE STATEMENT
49 y/o M with PMHx of HTN, on Valsartan, chronic LE edema, denies cardiac hx including CHF, presents today with SOB x 3 days. Patient states that it started because he took benadryl to sleep at that time due to being stressed and afterwards SOB started. Patient notes that he cannot walk a block without feeling extremely SOB, went to  for evaluation and Low O2 saturation noted, sent to ED. Patient with complaints of dry cough, quarantining due to COVID19, no known exposure. Denies fever, chills, chest pain, c/o palpitations. Pulse ox 78% on RA with good waveform. 51 y/o obese M with PMHx of HTN, on Valsartan, "heart murmur", chronic LE edema, denies cardiac hx including CAD or CHF, presents today with SOB and palpitations x 3 days. Patient states that it started after he took Benadryl to sleep secondary to insomnia and stress approx 3 days ago. Patient notes that he cannot walk a block without feeling extremely SOB, and went for evaluation to [facility] where and Low O2 saturation of 88% was noted and pt was sent to ED via EMS. EMS placed pt on O2 and gave pt Aspirin and NTG. Patient also with complaints of dry cough, states that he has been quarantining due to COVID19, no known exposure. Denies fever, chills, chest pain. Pulse ox 78% on RA with good waveform on my exam in ED.

## 2020-08-28 NOTE — H&P ADULT - NSHPLABSRESULTS_GEN_ALL_CORE
LABS:                         10.6   19.73 )-----------( 431      ( 28 Aug 2020 14:16 )             36.0     08-28    129<L>  |  87<L>  |  17  ----------------------------<  151<H>  3.6   |  29  |  0.98    Ca    9.6      28 Aug 2020 14:16    TPro  7.8  /  Alb  3.9  /  TBili  1.0  /  DBili  x   /  AST  27  /  ALT  40  /  AlkPhos  84  08-28    PT/INR - ( 28 Aug 2020 14:16 )   PT: 15.7 sec;   INR: 1.33          PTT - ( 28 Aug 2020 14:16 )  PTT:27.1 sec    CARDIAC MARKERS ( 28 Aug 2020 14:16 )  x     / 0.03 ng/mL / 155 U/L / x     / x          Serum Pro-Brain Natriuretic Peptide: 2598 pg/mL (08-28 @ 14:16)    Lactate, Blood: 1.8 mmol/L (08-28 @ 14:16)          RADIOLOGY, EKG & ADDITIONAL TESTS: Reviewed

## 2020-08-28 NOTE — CONSULT NOTE ADULT - SUBJECTIVE AND OBJECTIVE BOX
Cardiology Consult    HPI: 50M PMH HTN, cardiac murmur presented initially with acute shortness of breath and noted hypoxia at PCP for which admitted to MICU for respiratory failure 2/2 to PNA- r/o COVID in setting of significant b/l infiltrates, noted pulm edema. Patient on HFNC and undergoing IV diuresis and Tx with IV abx. Echo demonstrated reduced EF to 30-35% with global hypokinesis for which cardiology consulted. Patient states noted insomnia over last 1-2 weeks, some anxiety but no symptoms of fevers, chills. States that has cough when speaking for a prolonged period but nonproductive and unchanged from baseline. Patient developed acute onset of shortness of breath on Wednesday  for which seen at urgent care fro which noted to be hypoxic and sent to ED. Patient noted to be hypoxic to 70s on RA upon arrival. Troponin 0.03, BNP 2598, CRP 11.8, leukocytosis 19 with neutrophil predominance. EKG sinus tachycardia (), RA enlargement, right axis deviation with nonspecific ST changes. Denies chest pain. No changes in exercise tolerance.  may have had a COVID exposure from coworker in feb-march but no noted viral-like illnesses/symptoms of cough, fevers, body aches. States has had previous echos in past in setting of a hx of murmur but does not believe has been told that he has reduced EF. Reports hx of PNA- "walking PNA" at 8 years old and CAP  several years ago.  Telemetry: PVCs    PMH: HTN  Medications: Valsartan- HCTZ  FHx: Mother and uncle- CAD  Social: Never smoker      OBJECTIVE  Vitals:  T(C): 37.1 (20 @ 00:00), Max: 37.8 (20 @ 13:50)  HR: 82 (20 @ 00:00) (82 - 108)  BP: 126/71 (20 @ 00:00) (126/71 - 194/80)  RR: 40 (20 @ 00:00) (22 - 42)  SpO2: 96% (20 @ 00:00) (79% - 100%)  Wt(kg): --    I/O:  I&O's Summary    28 Aug 2020 07:01  -  29 Aug 2020 00:30  --------------------------------------------------------  IN: 0 mL / OUT: 1775 mL / NET: -1775 mL        PHYSICAL EXAM:  Appearance: NAD. Speaking in full sentences.   HEENT: Conjunctiva clear b/l.  Mildly distended neck veins  Cardiovascular: S1, S2, RRR  Respiratory: Lungs b/l crackles, though decreased at bases   Extremities: 2+ edema b/l. No erythema b/l. LE WWP b/l, DP intact  Neurologic:  Alert and awake. Moving all extremities. Following commands.   	  LABS:                        10.6   19.73 )-----------( 431      ( 28 Aug 2020 14:16 )             36.0     08-28    129<L>  |  87<L>  |  17  ----------------------------<  151<H>  3.6   |  29  |  0.98    Ca    9.6      28 Aug 2020 14:16    TPro  7.8  /  Alb  3.9  /  TBili  1.0  /  DBili  x   /  AST  27  /  ALT  40  /  AlkPhos  84  08-28    PT/INR - ( 28 Aug 2020 14:16 )   PT: 15.7 sec;   INR: 1.33          PTT - ( 28 Aug 2020 14:16 )  PTT:27.1 sec  Urinalysis Basic - ( 28 Aug 2020 19:03 )    Color: Yellow / Appearance: Clear / S.025 / pH: x  Gluc: x / Ketone: NEGATIVE  / Bili: Negative / Urobili: 0.2 E.U./dL   Blood: x / Protein: 100 mg/dL / Nitrite: NEGATIVE   Leuk Esterase: NEGATIVE / RBC: < 5 /HPF / WBC 5-10 /HPF   Sq Epi: x / Non Sq Epi: 0-5 /HPF / Bacteria: Present /HPF        RADIOLOGY & ADDITIONAL TESTS:  Reviewed .    MEDICATIONS  (STANDING):  azithromycin  IVPB 500 milliGRAM(s) IV Intermittent every 24 hours  cefTRIAXone   IVPB 1000 milliGRAM(s) IV Intermittent every 24 hours  chlorhexidine 4% Liquid 1 Application(s) Topical <User Schedule>  enoxaparin Injectable 40 milliGRAM(s) SubCutaneous every 12 hours

## 2020-08-28 NOTE — PATIENT PROFILE ADULT - CENTRAL VENOUS CATHETER/PICC LINE
Please inform patient to make nursing visit for vaccines. Due for influenza, shingrix, and varicella. Thanks.   no

## 2020-08-28 NOTE — CONSULT NOTE ADULT - ASSESSMENT
49 y/o M with hx of HTN and chronic LE edema , and cardiac murmur (since childhood), that presents from his outpatient PCP office for hypoxia and SOB. ICU consulted for hypoxic respiratory failure and r/o COVID.     #Hypoxic Respiratory Failure  Pt presents with tachypnea and hypoxia to 70s on RA with improvement to 96% on NRB. CXR demonstrating bilateral pulmonary infiltrates vs edema.   - c/w     #Sepsis     #Pulmonary Edema 51 y/o M with hx of HTN and chronic LE edema , and cardiac murmur (since childhood), that presents from his outpatient PCP office for hypoxia and SOB. ICU consulted for hypoxic respiratory failure and r/o COVID.     #Hypoxic Respiratory Failure  Pt presents with tachypnea and hypoxia to 70s on RA with improvement to 96% on NRB. CXR demonstrating bilateral pulmonary infiltrates vs edema.   - c/w     #Sepsis   Pt with tachypnea, tachycardia, and WBC > 19 with possible source being pulmonary.   - initiate broad spectrum abx (Vancomycin and Zosyn)  - order HIV test  - f/u blood cx  - f/u urine cx  - hold off on fluid resuscitation at this time due to possible fluid overload state.     #r/o COVID    #Pulmonary Edema 51 y/o M with hx of HTN and chronic LE edema , and cardiac murmur (since childhood), that presents from his outpatient PCP office for hypoxia and SOB. ICU consulted for hypoxic respiratory failure and r/o COVID.     #Hypoxic Respiratory Failure  Pt presents with tachypnea and hypoxia to 70s on RA with improvement to 96% on NRB. CXR demonstrating bilateral pulmonary infiltrates vs edema.   - will initiate BiPAP   - wean supplemental O2 as tolerated   - admit to intensive care unit     #Sepsis   Pt with tachypnea, tachycardia, and WBC > 19 with possible source being pulmonary.   - initiate broad spectrum abx (Vancomycin and Zosyn)  - order HIV test  - f/u blood cx  - f/u urine cx  - hold off on fluid resuscitation at this time due to possible fluid overload state.     #r/o COVID  Labs not completely consistent with covid  - f/u COVID PCR swab  - treat hypoxic respiratory failure as above     #Pulmonary Edema   CXR with bilateral pulmonary edema, with b/l LE edema, elevated proBNP and POCUS demonstrating B-lines  - given 1 dose of Lasix 40mg   - will consider standing Lasix 40mg   - f/u formal echocardiogram   Pt with hx of HFEF, most recent echo demonstrating EF %. On exam . Labs remarkable for .   - strict I/Os, monitor UOP carefully 51 y/o M with hx of HTN and chronic LE edema , and cardiac murmur (since childhood), that presents from his outpatient PCP office for hypoxia and SOB. ICU consulted for hypoxic respiratory failure and r/o COVID.     #Hypoxic Respiratory Failure  Pt presents with tachypnea and hypoxia to 70s on RA with improvement to 96% on NRB. CXR demonstrating bilateral pulmonary infiltrates vs edema.   - will initiate BiPAP   - wean supplemental O2 as tolerated   - admit to intensive care unit     #Sepsis   Pt with tachypnea, tachycardia, and WBC > 19 with possible source being pulmonary.   - initiate Cef/Azithromycin   - order HIV test  - f/u blood cx  - f/u urine cx  - hold off on fluid resuscitation at this time due to possible fluid overload state.     #r/o COVID  Labs not completely consistent with covid  - f/u COVID PCR swab  - treat hypoxic respiratory failure as above     #Pulmonary Edema   CXR with bilateral pulmonary edema, with b/l LE edema, elevated proBNP and POCUS demonstrating B-lines  - given 1 dose of Lasix 40mg   - will consider standing Lasix 40mg   - f/u formal echocardiogram   Pt with hx of HFEF, most recent echo demonstrating EF %. On exam . Labs remarkable for .   - strict I/Os, monitor UOP carefully 49 y/o M with hx of HTN and chronic LE edema , and cardiac murmur (since childhood), that presents from his outpatient PCP office for hypoxia and SOB. ICU consulted for hypoxic respiratory failure and r/o COVID.     #Hypoxic Respiratory Failure  Pt presents with tachypnea and hypoxia to 70s on RA with improvement to 96% on NRB. CXR demonstrating bilateral pulmonary infiltrates vs edema.   - will initiate BiPAP   - wean supplemental O2 as tolerated   - admit to intensive care unit     #Sepsis   Pt with tachypnea, tachycardia, and WBC > 19 with possible source being pulmonary.   - c/w Ceftriaxone and Azithromycin to cover CAP  - order HIV test (consented)   - f/u blood cx  - f/u urine cx  - f/u urine legionella and strep Ag  - hold off on fluid resuscitation at this time due to possible fluid overload state.     #r/o COVID  Labs not completely consistent with covid  - f/u COVID PCR swab  - treat hypoxic respiratory failure as above     #Pulmonary Edema   CXR with bilateral pulmonary edema, with b/l LE edema, elevated proBNP and POCUS demonstrating B-lines  - given 1 dose of Lasix 40mg   - will consider standing Lasix 40mg if improving   - f/u formal echocardiogram   Pt with hx of HFEF, most recent echo demonstrating EF %. On exam . Labs remarkable for .   - strict I/Os, monitor UOP carefully    #Hyponatremia  Pt with hyponatrmiea , possibly 2/2 to hypovolemia   - trend BMP  - f/u serum osmolality   - f/u urine osmolality and Na    Dispo: [facility]  CODE: FULL CODE 51 y/o M with hx of HTN and chronic LE edema , and cardiac murmur (since childhood), that presents from his outpatient PCP office for hypoxia and SOB. ICU consulted for hypoxic respiratory failure and r/o COVID.     #Hypoxic Respiratory Failure  Pt presents with tachypnea and hypoxia to 70s on RA with improvement to 96% on NRB. CXR demonstrating bilateral pulmonary infiltrates vs edema.   - will initiate BiPAP   - wean supplemental O2 as tolerated   - admit to intensive care unit     #Sepsis   Pt with tachypnea, tachycardia, and WBC > 19 with possible source being pulmonary.   - c/w Ceftriaxone and Azithromycin to cover CAP  - order HIV test (consented)   - f/u blood cx  - f/u urine cx  - f/u urine legionella and strep Ag  - hold off on fluid resuscitation at this time due to possible fluid overload state.     #r/o COVID  Labs not completely consistent with covid  - f/u COVID PCR swab  - treat hypoxic respiratory failure as above     #Pulmonary Edema   CXR with bilateral pulmonary edema, with b/l LE edema, elevated proBNP and POCUS demonstrating B-lines. 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  - given 1 dose of Lasix 40mg   - will consider standing Lasix 40mg if improving   - f/u formal echocardiogram   Pt with hx of HFEF, most recent echo demonstrating EF %. On exam . Labs remarkable for .   - strict I/Os, monitor UOP carefully    #Hyponatremia  Pt with hyponatrmiea , possibly 2/2 to hypovolemia   - trend BMP  - f/u serum osmolality   - f/u urine osmolality and Na    Dispo: [facility]  CODE: FULL CODE 51 y/o M with hx of HTN and chronic LE edema , and cardiac murmur (since childhood), that presents from his outpatient PCP office for hypoxia and SOB. ICU consulted for hypoxic respiratory failure and r/o COVID.     #Hypoxic Respiratory Failure  Pt presents with tachypnea and hypoxia to 70s on RA with improvement to 96% on NRB. CXR demonstrating bilateral pulmonary infiltrates vs edema.   - will initiate BiPAP   - wean supplemental O2 as tolerated   - admit to intensive care unit     #Sepsis   Pt with tachypnea, tachycardia, and WBC > 19 with possible source being pulmonary.   - c/w Ceftriaxone and Azithromycin to cover CAP  - order HIV test (consented)   - f/u blood cx  - f/u urine cx  - f/u urine legionella and strep Ag  - hold off on fluid resuscitation at this time due to possible fluid overload state.     #r/o COVID  Labs not completely consistent with covid  - f/u COVID PCR swab  - treat hypoxic respiratory failure as above     #Pulmonary Edema   CXR with bilateral pulmonary edema, with b/l LE edema, elevated proBNP and POCUS demonstrating B-lines. 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  - given 1 dose of Lasix 40mg   - will consider standing Lasix 40mg if improving   - f/u formal echocardiogram   Pt with hx of HFEF, most recent echo demonstrating EF %. On exam . Labs remarkable for .   - strict I/Os, monitor UOP carefully    #Hyponatremia  Pt with hyponatrmiea , likely hypervolemic hyponatremia evidenced by pulmonary edema and b/l LE edema    - trend BMP  - f/u serum osmolality   - f/u urine osmolality and Na    Dispo: [facility]  CODE: FULL CODE

## 2020-08-28 NOTE — ED PROVIDER NOTE - PHYSICAL EXAMINATION
VITAL SIGNS: I have reviewed nursing notes and confirm.  CONSTITUTIONAL: Obese. Dyspneic, tachypneic, diaphoretic. Well-developed; well-nourished; in no acute distress.   SKIN:  warm and dry, no acute rash.   HEAD:  normocephalic, atraumatic.  EYES: EOM intact; conjunctiva and sclera clear.  ENT: No nasal discharge; airway clear.   NECK: Supple; non tender.  CARD: S1, S2 normal; no murmurs, gallops, or rubs. Regular rate and rhythm.   RESP:  Dyspneic and tachypneic, rhonchi b/l.   ABD: Obese abdomen with no ttp.  Normal bowel sounds; soft; non-distended; no guarding/ rebound.  EXT: B/L LE edema, no pain, no calf ttp. Normal ROM. No clubbing, cyanosis. 2+ pulses to b/l ue/le.  NEURO: Alert, oriented, grossly unremarkable  PSYCH: Cooperative, mood and affect appropriate. VITAL SIGNS: I have reviewed nursing notes and confirm.  CONSTITUTIONAL: Obese. Dyspneic, tachypneic, diaphoretic.  SKIN:  diaphoretic   HEAD:  normocephalic, atraumatic.  EYES: EOM intact; conjunctiva and sclera clear.  ENT: No nasal discharge; airway clear.   NECK: Supple; non tender.  CARD: S1, S2 normal Regular rate and rhythm.   RESP:  Dyspneic and tachypneic, rales and rhonchi b/l.   ABD: Obese abdomen with no ttp.  soft; non-distended; no guarding/ rebound.  EXT: B/L LE edema, no pain, no calf ttp. Normal ROM.    PSYCH: Cooperative, mood and affect appropriate.

## 2020-08-28 NOTE — PATIENT PROFILE ADULT - NSTRANSFERBELONGINGSRESP_GEN_A_NUR
yes Scribe Attestation (For Scribes USE Only)... Scribe Attestation (For Scribes USE Only).../Attending Attestation (For Attendings USE Only)...

## 2020-08-29 LAB
ALBUMIN SERPL ELPH-MCNC: 3.5 G/DL — SIGNIFICANT CHANGE UP (ref 3.3–5)
ALBUMIN SERPL ELPH-MCNC: 3.6 G/DL — SIGNIFICANT CHANGE UP (ref 3.3–5)
ALP SERPL-CCNC: 72 U/L — SIGNIFICANT CHANGE UP (ref 40–120)
ALP SERPL-CCNC: 77 U/L — SIGNIFICANT CHANGE UP (ref 40–120)
ALT FLD-CCNC: 32 U/L — SIGNIFICANT CHANGE UP (ref 10–45)
ALT FLD-CCNC: 33 U/L — SIGNIFICANT CHANGE UP (ref 10–45)
ANION GAP SERPL CALC-SCNC: 11 MMOL/L — SIGNIFICANT CHANGE UP (ref 5–17)
ANION GAP SERPL CALC-SCNC: 12 MMOL/L — SIGNIFICANT CHANGE UP (ref 5–17)
APTT BLD: 26.9 SEC — LOW (ref 27.5–35.5)
AST SERPL-CCNC: 20 U/L — SIGNIFICANT CHANGE UP (ref 10–40)
AST SERPL-CCNC: 21 U/L — SIGNIFICANT CHANGE UP (ref 10–40)
BASOPHILS # BLD AUTO: 0.02 K/UL — SIGNIFICANT CHANGE UP (ref 0–0.2)
BASOPHILS NFR BLD AUTO: 0.1 % — SIGNIFICANT CHANGE UP (ref 0–2)
BILIRUB SERPL-MCNC: 0.6 MG/DL — SIGNIFICANT CHANGE UP (ref 0.2–1.2)
BILIRUB SERPL-MCNC: 0.6 MG/DL — SIGNIFICANT CHANGE UP (ref 0.2–1.2)
BLD GP AB SCN SERPL QL: NEGATIVE — SIGNIFICANT CHANGE UP
BLD GP AB SCN SERPL QL: NEGATIVE — SIGNIFICANT CHANGE UP
BUN SERPL-MCNC: 16 MG/DL — SIGNIFICANT CHANGE UP (ref 7–23)
BUN SERPL-MCNC: 17 MG/DL — SIGNIFICANT CHANGE UP (ref 7–23)
CALCIUM SERPL-MCNC: 9 MG/DL — SIGNIFICANT CHANGE UP (ref 8.4–10.5)
CALCIUM SERPL-MCNC: 9 MG/DL — SIGNIFICANT CHANGE UP (ref 8.4–10.5)
CHLORIDE SERPL-SCNC: 90 MMOL/L — LOW (ref 96–108)
CHLORIDE SERPL-SCNC: 91 MMOL/L — LOW (ref 96–108)
CO2 SERPL-SCNC: 33 MMOL/L — HIGH (ref 22–31)
CO2 SERPL-SCNC: 35 MMOL/L — HIGH (ref 22–31)
CREAT SERPL-MCNC: 0.94 MG/DL — SIGNIFICANT CHANGE UP (ref 0.5–1.3)
CREAT SERPL-MCNC: 0.96 MG/DL — SIGNIFICANT CHANGE UP (ref 0.5–1.3)
D DIMER BLD IA.RAPID-MCNC: 392 NG/ML DDU — HIGH
EOSINOPHIL # BLD AUTO: 0.01 K/UL — SIGNIFICANT CHANGE UP (ref 0–0.5)
EOSINOPHIL NFR BLD AUTO: 0.1 % — SIGNIFICANT CHANGE UP (ref 0–6)
GLUCOSE SERPL-MCNC: 105 MG/DL — HIGH (ref 70–99)
GLUCOSE SERPL-MCNC: 112 MG/DL — HIGH (ref 70–99)
HCT VFR BLD CALC: 33.9 % — LOW (ref 39–50)
HGB BLD-MCNC: 10 G/DL — LOW (ref 13–17)
IMM GRANULOCYTES NFR BLD AUTO: 0.7 % — SIGNIFICANT CHANGE UP (ref 0–1.5)
INR BLD: 1.27 — HIGH (ref 0.88–1.16)
LYMPHOCYTES # BLD AUTO: 1.33 K/UL — SIGNIFICANT CHANGE UP (ref 1–3.3)
LYMPHOCYTES # BLD AUTO: 7.5 % — LOW (ref 13–44)
MAGNESIUM SERPL-MCNC: 2.1 MG/DL — SIGNIFICANT CHANGE UP (ref 1.6–2.6)
MAGNESIUM SERPL-MCNC: 2.2 MG/DL — SIGNIFICANT CHANGE UP (ref 1.6–2.6)
MCHC RBC-ENTMCNC: 20.7 PG — LOW (ref 27–34)
MCHC RBC-ENTMCNC: 29.5 GM/DL — LOW (ref 32–36)
MCV RBC AUTO: 70 FL — LOW (ref 80–100)
MONOCYTES # BLD AUTO: 1.17 K/UL — HIGH (ref 0–0.9)
MONOCYTES NFR BLD AUTO: 6.6 % — SIGNIFICANT CHANGE UP (ref 2–14)
NEUTROPHILS # BLD AUTO: 15.1 K/UL — HIGH (ref 1.8–7.4)
NEUTROPHILS NFR BLD AUTO: 85 % — HIGH (ref 43–77)
NRBC # BLD: 0 /100 WBCS — SIGNIFICANT CHANGE UP (ref 0–0)
PHOSPHATE SERPL-MCNC: 3.4 MG/DL — SIGNIFICANT CHANGE UP (ref 2.5–4.5)
PHOSPHATE SERPL-MCNC: 4.9 MG/DL — HIGH (ref 2.5–4.5)
PLATELET # BLD AUTO: 341 K/UL — SIGNIFICANT CHANGE UP (ref 150–400)
POTASSIUM SERPL-MCNC: 3.2 MMOL/L — LOW (ref 3.5–5.3)
POTASSIUM SERPL-MCNC: 3.7 MMOL/L — SIGNIFICANT CHANGE UP (ref 3.5–5.3)
POTASSIUM SERPL-SCNC: 3.2 MMOL/L — LOW (ref 3.5–5.3)
POTASSIUM SERPL-SCNC: 3.7 MMOL/L — SIGNIFICANT CHANGE UP (ref 3.5–5.3)
PROT SERPL-MCNC: 7.3 G/DL — SIGNIFICANT CHANGE UP (ref 6–8.3)
PROT SERPL-MCNC: 7.7 G/DL — SIGNIFICANT CHANGE UP (ref 6–8.3)
PROTHROM AB SERPL-ACNC: 15.1 SEC — HIGH (ref 10.6–13.6)
RBC # BLD: 4.84 M/UL — SIGNIFICANT CHANGE UP (ref 4.2–5.8)
RBC # FLD: 20.3 % — HIGH (ref 10.3–14.5)
RH IG SCN BLD-IMP: POSITIVE — SIGNIFICANT CHANGE UP
RH IG SCN BLD-IMP: POSITIVE — SIGNIFICANT CHANGE UP
S PNEUM AG UR QL: NEGATIVE — SIGNIFICANT CHANGE UP
SARS-COV-2 RNA SPEC QL NAA+PROBE: SIGNIFICANT CHANGE UP
SARS-COV-2 RNA SPEC QL NAA+PROBE: SIGNIFICANT CHANGE UP
SODIUM SERPL-SCNC: 135 MMOL/L — SIGNIFICANT CHANGE UP (ref 135–145)
SODIUM SERPL-SCNC: 137 MMOL/L — SIGNIFICANT CHANGE UP (ref 135–145)
WBC # BLD: 17.76 K/UL — HIGH (ref 3.8–10.5)
WBC # FLD AUTO: 17.76 K/UL — HIGH (ref 3.8–10.5)

## 2020-08-29 PROCEDURE — 99231 SBSQ HOSP IP/OBS SF/LOW 25: CPT

## 2020-08-29 PROCEDURE — 99233 SBSQ HOSP IP/OBS HIGH 50: CPT

## 2020-08-29 PROCEDURE — 71045 X-RAY EXAM CHEST 1 VIEW: CPT | Mod: 26

## 2020-08-29 RX ORDER — POTASSIUM CHLORIDE 20 MEQ
40 PACKET (EA) ORAL ONCE
Refills: 0 | Status: COMPLETED | OUTPATIENT
Start: 2020-08-29 | End: 2020-08-29

## 2020-08-29 RX ORDER — LISINOPRIL 2.5 MG/1
2.5 TABLET ORAL EVERY 24 HOURS
Refills: 0 | Status: DISCONTINUED | OUTPATIENT
Start: 2020-08-30 | End: 2020-08-31

## 2020-08-29 RX ORDER — FUROSEMIDE 40 MG
40 TABLET ORAL EVERY 12 HOURS
Refills: 0 | Status: DISCONTINUED | OUTPATIENT
Start: 2020-08-29 | End: 2020-08-30

## 2020-08-29 RX ORDER — PROPOFOL 10 MG/ML
10 INJECTION, EMULSION INTRAVENOUS ONCE
Refills: 0 | Status: DISCONTINUED | OUTPATIENT
Start: 2020-08-29 | End: 2020-08-30

## 2020-08-29 RX ORDER — NOREPINEPHRINE BITARTRATE/D5W 8 MG/250ML
0.05 PLASTIC BAG, INJECTION (ML) INTRAVENOUS
Qty: 8 | Refills: 0 | Status: DISCONTINUED | OUTPATIENT
Start: 2020-08-29 | End: 2020-08-30

## 2020-08-29 RX ORDER — POTASSIUM CHLORIDE 20 MEQ
40 PACKET (EA) ORAL EVERY 4 HOURS
Refills: 0 | Status: COMPLETED | OUTPATIENT
Start: 2020-08-29 | End: 2020-08-29

## 2020-08-29 RX ORDER — FUROSEMIDE 40 MG
60 TABLET ORAL ONCE
Refills: 0 | Status: COMPLETED | OUTPATIENT
Start: 2020-08-29 | End: 2020-08-29

## 2020-08-29 RX ORDER — PROPOFOL 10 MG/ML
5 INJECTION, EMULSION INTRAVENOUS
Qty: 1000 | Refills: 0 | Status: DISCONTINUED | OUTPATIENT
Start: 2020-08-29 | End: 2020-08-30

## 2020-08-29 RX ADMIN — ENOXAPARIN SODIUM 40 MILLIGRAM(S): 100 INJECTION SUBCUTANEOUS at 06:29

## 2020-08-29 RX ADMIN — Medication 40 MILLIEQUIVALENT(S): at 08:18

## 2020-08-29 RX ADMIN — Medication 40 MILLIEQUIVALENT(S): at 15:04

## 2020-08-29 RX ADMIN — CHLORHEXIDINE GLUCONATE 1 APPLICATION(S): 213 SOLUTION TOPICAL at 06:29

## 2020-08-29 RX ADMIN — CEFTRIAXONE 100 MILLIGRAM(S): 500 INJECTION, POWDER, FOR SOLUTION INTRAMUSCULAR; INTRAVENOUS at 15:04

## 2020-08-29 RX ADMIN — Medication 40 MILLIGRAM(S): at 11:40

## 2020-08-29 RX ADMIN — Medication 40 MILLIEQUIVALENT(S): at 09:58

## 2020-08-29 RX ADMIN — ENOXAPARIN SODIUM 40 MILLIGRAM(S): 100 INJECTION SUBCUTANEOUS at 17:37

## 2020-08-29 RX ADMIN — AZITHROMYCIN 255 MILLIGRAM(S): 500 TABLET, FILM COATED ORAL at 15:54

## 2020-08-29 RX ADMIN — Medication 40 MILLIGRAM(S): at 17:37

## 2020-08-29 RX ADMIN — Medication 60 MILLIGRAM(S): at 01:24

## 2020-08-29 NOTE — PROGRESS NOTE ADULT - SUBJECTIVE AND OBJECTIVE BOX
INTERVAL EVENTS:  - 3L UOP overnight  - Maintained on HFNC. 70% O2 requirement decreased to 50% O2 requirement this am. Patient states he feels much better although he is still requiring HFNC and has not moved out of reclining chair since admission.  - Tele reviewed. Few PVCs.    MEDICATIONS  (STANDING):  azithromycin  IVPB 500 milliGRAM(s) IV Intermittent every 24 hours  cefTRIAXone   IVPB 1000 milliGRAM(s) IV Intermittent every 24 hours  chlorhexidine 4% Liquid 1 Application(s) Topical <User Schedule>  enoxaparin Injectable 40 milliGRAM(s) SubCutaneous every 12 hours  furosemide   Injectable 40 milliGRAM(s) IV Push every 12 hours    MEDICATIONS  (PRN):      Vital Signs Last 24 Hrs  T(C): 36.7 (29 Aug 2020 10:00), Max: 37.3 (28 Aug 2020 19:00)  T(F): 98 (29 Aug 2020 10:00), Max: 99.1 (28 Aug 2020 19:00)  HR: 84 (29 Aug 2020 15:00) (81 - 100)  BP: 117/72 (29 Aug 2020 15:00) (116/70 - 194/80)  BP(mean): 83 (29 Aug 2020 11:00) (83 - 118)  RR: 26 (29 Aug 2020 15:00) (26 - 42)  SpO2: 95% (29 Aug 2020 15:00) (92% - 100%)     PHYSICAL EXAM:  GEN: Awake, alert. NAD.   HEENT: NCAT, PERRL, EOMI. Mucosa moist. No JVD.  RESP: CTAB, decreased at bases  CV: RRR. Normal S1/S2. No m/r/g.  ABD: Soft. NT/ND. BS+  EXT: Trace edema  NEURO: AAOx3. No focal deficits.     LABS:                        10.0   17.76 )-----------( 341      ( 29 Aug 2020 05:32 )             33.9     08-    137  |  91<L>  |  17  ----------------------------<  105<H>  3.7   |  35<H>  |  0.94    Ca    9.0      29 Aug 2020 13:10  Phos  3.4     08-  Mg     2.2         TPro  7.3  /  Alb  3.5  /  TBili  0.6  /  DBili  x   /  AST  21  /  ALT  32  /  AlkPhos  72  08-29    CARDIAC MARKERS ( 28 Aug 2020 14:16 )  x     / 0.03 ng/mL / 155 U/L / x     / x          PT/INR - ( 29 Aug 2020 05:32 )   PT: 15.1 sec;   INR: 1.27          PTT - ( 29 Aug 2020 05:32 )  PTT:26.9 sec  Urinalysis Basic - ( 28 Aug 2020 19:03 )    Color: Yellow / Appearance: Clear / S.025 / pH: x  Gluc: x / Ketone: NEGATIVE  / Bili: Negative / Urobili: 0.2 E.U./dL   Blood: x / Protein: 100 mg/dL / Nitrite: NEGATIVE   Leuk Esterase: NEGATIVE / RBC: < 5 /HPF / WBC 5-10 /HPF   Sq Epi: x / Non Sq Epi: 0-5 /HPF / Bacteria: Present /HPF      I&O's Summary    28 Aug 2020 07:  -  29 Aug 2020 07:00  --------------------------------------------------------  IN: 0 mL / OUT: 2985 mL / NET: -2985 mL    29 Aug 2020 07:  -  29 Aug 2020 15:21  --------------------------------------------------------  IN: 180 mL / OUT: 1300 mL / NET: -1120 mL      BNP  RADIOLOGY & ADDITIONAL STUDIES:    TELEMETRY: as above    EKG: nsr, left atrial enlargement, r axis, s1q3t3, prwp    50M PMH HTN, cardiac murmur presented initially with acute shortness of breath and noted hypoxia at PCP for which admitted to MICU for respiratory failure 2/2 to PNA- r/o COVID in setting of significant b/l infiltrates, noted pulm edema. Patient on HFNC and undergoing IV diuresis and Tx with IV abx. Cardiology consulted for reduced EF 30-35% in setting of unknown hx of CHF.     PLAN:   # Reduced Ejection fraction 30-35%, Decompensated CHF, global biventricular failure  Limited TTE (): No significant valvular disease, trace tricuspid regurgitation, PASP 76 mmHg, Right ventricular size cannot be determined with accuracy, RV probably dilated. Right ventricular systolic function is reduced, TAPSE 14.00 mm (normal >=17 mm). RV tissue Doppler S' is 16.00 cm/s (normal >10 cm/s), Left ventricular endocardium is not well visualized. Grossly, left ventricular function appears moderate to severely reduced with a calculated ejection fraction of 30-35% with global hypokinesis, Trivial pericardial effusion  - Convert to lasix 40 PO daily  - Please trend cardiac enzymes to peak  - Please obtain daily EKGs    Sukh Nick MD PGY4  To be discussed with attending Dr. Urieb INTERVAL EVENTS:  - 3L UOP overnight  - Maintained on HFNC. 70% O2 requirement decreased to 50% O2 requirement this am. Patient states he feels much better although he is still requiring HFNC and has not moved out of reclining chair since admission.  - Tele reviewed. Few PVCs.    MEDICATIONS  (STANDING):  azithromycin  IVPB 500 milliGRAM(s) IV Intermittent every 24 hours  cefTRIAXone   IVPB 1000 milliGRAM(s) IV Intermittent every 24 hours  chlorhexidine 4% Liquid 1 Application(s) Topical <User Schedule>  enoxaparin Injectable 40 milliGRAM(s) SubCutaneous every 12 hours  furosemide   Injectable 40 milliGRAM(s) IV Push every 12 hours    MEDICATIONS  (PRN):      Vital Signs Last 24 Hrs  T(C): 36.7 (29 Aug 2020 10:00), Max: 37.3 (28 Aug 2020 19:00)  T(F): 98 (29 Aug 2020 10:00), Max: 99.1 (28 Aug 2020 19:00)  HR: 84 (29 Aug 2020 15:00) (81 - 100)  BP: 117/72 (29 Aug 2020 15:00) (116/70 - 194/80)  BP(mean): 83 (29 Aug 2020 11:00) (83 - 118)  RR: 26 (29 Aug 2020 15:00) (26 - 42)  SpO2: 95% (29 Aug 2020 15:00) (92% - 100%)     PHYSICAL EXAM:  GEN: Awake, alert. NAD.   HEENT: NCAT, PERRL, EOMI. Mucosa moist. No JVD.  RESP: CTAB, decreased at bases  CV: RRR. Normal S1/S2. No m/r/g.  ABD: Soft. NT/ND. BS+  EXT: Trace edema  NEURO: AAOx3. No focal deficits.     LABS:                        10.0   17.76 )-----------( 341      ( 29 Aug 2020 05:32 )             33.9     08-    137  |  91<L>  |  17  ----------------------------<  105<H>  3.7   |  35<H>  |  0.94    Ca    9.0      29 Aug 2020 13:10  Phos  3.4     08-  Mg     2.2         TPro  7.3  /  Alb  3.5  /  TBili  0.6  /  DBili  x   /  AST  21  /  ALT  32  /  AlkPhos  72  08-29    CARDIAC MARKERS ( 28 Aug 2020 14:16 )  x     / 0.03 ng/mL / 155 U/L / x     / x          PT/INR - ( 29 Aug 2020 05:32 )   PT: 15.1 sec;   INR: 1.27          PTT - ( 29 Aug 2020 05:32 )  PTT:26.9 sec  Urinalysis Basic - ( 28 Aug 2020 19:03 )    Color: Yellow / Appearance: Clear / S.025 / pH: x  Gluc: x / Ketone: NEGATIVE  / Bili: Negative / Urobili: 0.2 E.U./dL   Blood: x / Protein: 100 mg/dL / Nitrite: NEGATIVE   Leuk Esterase: NEGATIVE / RBC: < 5 /HPF / WBC 5-10 /HPF   Sq Epi: x / Non Sq Epi: 0-5 /HPF / Bacteria: Present /HPF      I&O's Summary    28 Aug 2020 07:  -  29 Aug 2020 07:00  --------------------------------------------------------  IN: 0 mL / OUT: 2985 mL / NET: -2985 mL    29 Aug 2020 07:  -  29 Aug 2020 15:21  --------------------------------------------------------  IN: 180 mL / OUT: 1300 mL / NET: -1120 mL      BNP  RADIOLOGY & ADDITIONAL STUDIES:    TELEMETRY: as above    EKG: nsr, left atrial enlargement, r axis, s1q3, prwp    50M PMH HTN, cardiac murmur presented initially with acute shortness of breath and noted hypoxia at PCP for which admitted to MICU for respiratory failure 2/2 to PNA- r/o COVID in setting of significant b/l infiltrates, noted pulm edema. Patient on HFNC and undergoing IV diuresis and Tx with IV abx. Cardiology consulted for reduced EF 30-35% in setting of unknown hx of CHF.     PLAN:   # Reduced Ejection fraction 30-35%, Decompensated CHF, global biventricular failure.   Limited TTE (): No significant valvular disease, trace tricuspid regurgitation, PASP 76 mmHg, Right ventricular size cannot be determined with accuracy, RV probably dilated. Right ventricular systolic function is reduced, TAPSE 14.00 mm (normal >=17 mm). RV tissue Doppler S' is 16.00 cm/s (normal >10 cm/s), Left ventricular endocardium is not well visualized. Grossly, left ventricular function appears moderate to severely reduced with a calculated ejection fraction of 30-35% with global hypokinesis, Trivial pericardial effusion  - Convert to lasix 40 PO daily  - Please trend cardiac enzymes to peak  - Please obtain daily EKGs  - Would start lisinopril 2.5  - check covid ab.     Sukh Nick MD PGY4  To be discussed with attending Dr. Uribe

## 2020-08-29 NOTE — PROGRESS NOTE ADULT - SUBJECTIVE AND OBJECTIVE BOX
HOSPITAL COURSE:   51 y/o M with hx of HTN and chronic LE edema , and cardiac murmur (since childhood), that presents from his outpatient PCP office for hypoxia and SOB. In the ED T: 100.1 Rectal HR: 101-> 95 BP: 152/95 RR: 22 Saturating 79% on RA and 96% on NRB . Labs remarkable for WBC 19.73, Hgb 10.6, Plt 431, Na 129, Cl 87, CRP 11.88,  Procalcitonin .12, Trop .03, proBNP > 2000, . VBG demonstrating CXR demonstrating bilateral pulmonary infiltrate/edema. ICU consulted for hypoxic respiratory failure and r/o COVID. Repeat covid swabs were negative x3. Patient was placed on bipap overnight, given lasix 40mg, and started of ceftriaxone and azithromycin for PNA. His tachypnea improved on the bipap and he was placed on high flow. Standing lasix was ordered for 40mg BID.     INTERVAL HPI/OVERNIGHT EVENTS: Overnight the patient was on BIPAP.     SUBJECTIVE: Patient seen and examined at bedside. Patient states he feels much better and his breathing has improved.    VITAL SIGNS:  ICU Vital Signs Last 24 Hrs  T(C): 37.3 (29 Aug 2020 17:41), Max: 37.3 (29 Aug 2020 15:00)  T(F): 99.2 (29 Aug 2020 17:41), Max: 99.2 (29 Aug 2020 17:41)  HR: 88 (29 Aug 2020 18:11) (81 - 94)  BP: 111/75 (29 Aug 2020 17:00) (111/75 - 151/98)  BP(mean): 88 (29 Aug 2020 17:00) (83 - 118)  ABP: --  ABP(mean): --  RR: 28 (29 Aug 2020 17:00) (26 - 42)  SpO2: 95% (29 Aug 2020 18:11) (94% - 98%)         @ :  -   @ 07:00  --------------------------------------------------------  IN: 0 mL / OUT: 2985 mL / NET: -2985 mL     @ 07: @ 19:09  --------------------------------------------------------  IN: 665 mL / OUT: 1300 mL / NET: -635 mL      CAPILLARY BLOOD GLUCOSE          PHYSICAL EXAM:    Constitutional: NAD  HEENT: NC/AT; PERRL, anicteric sclera; MMM  Cardiovascular: +S1/S2, RRR  Respiratory: mildly tachypneic, decreased lung sounds at bases   Gastrointestinal: abdomen soft, NT/ND; no rebound or guarding  Genitourinary: no suprapubic tenderness or fullness  Extremities: enlarged lower extremities, edemetous  Dermatologic: normal color and turgor; no visible rashes  Neurological: alert and oriented x4, no focal deficits     MEDICATIONS:  MEDICATIONS  (STANDING):  azithromycin  IVPB 500 milliGRAM(s) IV Intermittent every 24 hours  cefTRIAXone   IVPB 1000 milliGRAM(s) IV Intermittent every 24 hours  chlorhexidine 4% Liquid 1 Application(s) Topical <User Schedule>  enoxaparin Injectable 40 milliGRAM(s) SubCutaneous every 12 hours  furosemide   Injectable 40 milliGRAM(s) IV Push every 12 hours    MEDICATIONS  (PRN):      ALLERGIES:  Allergies    No Known Allergies    Intolerances        LABS:                        10.0   17.76 )-----------( 341      ( 29 Aug 2020 05:32 )             33.9         137  |  91<L>  |  17  ----------------------------<  105<H>  3.7   |  35<H>  |  0.94    Ca    9.0      29 Aug 2020 13:10  Phos  3.4       Mg     2.2         TPro  7.3  /  Alb  3.5  /  TBili  0.6  /  DBili  x   /  AST  21  /  ALT  32  /  AlkPhos  72      PT/INR - ( 29 Aug 2020 05:32 )   PT: 15.1 sec;   INR: 1.27          PTT - ( 29 Aug 2020 05:32 )  PTT:26.9 sec  Urinalysis Basic - ( 28 Aug 2020 19:03 )    Color: Yellow / Appearance: Clear / S.025 / pH: x  Gluc: x / Ketone: NEGATIVE  / Bili: Negative / Urobili: 0.2 E.U./dL   Blood: x / Protein: 100 mg/dL / Nitrite: NEGATIVE   Leuk Esterase: NEGATIVE / RBC: < 5 /HPF / WBC 5-10 /HPF   Sq Epi: x / Non Sq Epi: 0-5 /HPF / Bacteria: Present /HPF    RADIOLOGY & ADDITIONAL TESTS: Reviewed.    DISPO - continue intensive level of care in CCM/MICU service

## 2020-08-29 NOTE — PROGRESS NOTE ADULT - ASSESSMENT
A - acute hypoxic respiratory failure/pulmonary edema/ possible pneumonia/ biventricular HF low EF/ mobid obesity/SHONA    Suggest:  taper off high flow  continued negative balance,   add diamox for alkalosis  continue ABX  covid neg x2, repeat x1 with antibodies  car fu - beta blockers/ACE ?

## 2020-08-29 NOTE — CHART NOTE - NSCHARTNOTEFT_GEN_A_CORE
Upon Nutritional Assessment by the Registered Dietitian your patient was determined to meet criteria / has evidence of the following diagnosis/diagnoses:          [ ]  Mild Protein Calorie Malnutrition        [ ]  Moderate Protein Calorie Malnutrition        [ ] Severe Protein Calorie Malnutrition        [ ] Unspecified Protein Calorie Malnutrition        [ ] Underweight / BMI <19        [ x] Morbid Obesity / BMI > 40      Findings as based on:  •  Comprehensive nutrition assessment and consultation      BMI 50.2     Treatment:    The following diet has been recommended:       pt will have safe gradual wt loss upon d/c of 0.5-1.0 pounds/week to reach IBW     PROVIDER Section:     By signing this assessment you are acknowledging and agree with the diagnosis/diagnoses assigned by the Registered Dietitian    Comments:

## 2020-08-29 NOTE — PROGRESS NOTE ADULT - SUBJECTIVE AND OBJECTIVE BOX
Patient is a 50y old  Male who presents with a chief complaint of SOB (28 Aug 2020 23:29)        INTERVAL HPI/OVERNIGHT EVENTS: none    SYMPTOMS less sob    DRIPS  none        ICU Vital Signs Last 24 Hrs  T(C): 36.9 (29 Aug 2020 06:00), Max: 37.8 (28 Aug 2020 13:50)  T(F): 98.5 (29 Aug 2020 06:00), Max: 100.1 (28 Aug 2020 13:50)  HR: 84 (29 Aug 2020 09:00) (81 - 108)  BP: 135/83 (29 Aug 2020 09:00) (122/69 - 194/80)  BP(mean): 102 (29 Aug 2020 09:00) (89 - 118)  ABP: --  ABP(mean): --  RR: 26 (29 Aug 2020 09:00) (22 - 42)  SpO2: 97% (29 Aug 2020 09:00) (79% - 100%)      I&O's Summary    28 Aug 2020 07:  -  29 Aug 2020 07:00  --------------------------------------------------------  IN: 0 mL / OUT: 2985 mL / NET: -2985 mL    29 Aug 2020 07:01  -  29 Aug 2020 10:40  --------------------------------------------------------  IN: 60 mL / OUT: 400 mL / NET: -340 mL        EXAM    Chest rales bilaterally    Heart rr    Abdomen soft nontender with bs    Extremities edema pitting    Neuro awake, alert      LABS:                            10.0   17.76 )-----------( 341      ( 29 Aug 2020 05:32 )             33.9     08-29    135  |  90<L>  |  16  ----------------------------<  112<H>  3.2<L>   |  33<H>  |  0.96    Ca    9.0      29 Aug 2020 05:32  Phos  4.9     08-29  Mg     2.1         TPro  7.7  /  Alb  3.6  /  TBili  0.6  /  DBili  x   /  AST  20  /  ALT  33  /  AlkPhos  77      PT/INR - ( 29 Aug 2020 05:32 )   PT: 15.1 sec;   INR: 1.27          PTT - ( 29 Aug 2020 05:32 )  PTT:26.9 sec  Urinalysis Basic - ( 28 Aug 2020 19:03 )    Color: Yellow / Appearance: Clear / S.025 / pH: x  Gluc: x / Ketone: NEGATIVE  / Bili: Negative / Urobili: 0.2 E.U./dL   Blood: x / Protein: 100 mg/dL / Nitrite: NEGATIVE   Leuk Esterase: NEGATIVE / RBC: < 5 /HPF / WBC 5-10 /HPF   Sq Epi: x / Non Sq Epi: 0-5 /HPF / Bacteria: Present /HPF      Lactate, Blood: 1.8 mmol/L ( @ 14:16)    pH, Venous: 7.37 ( @ 18:32)  pCO2, Venous: 53 mmHg ( @ 18:32)  Oxygen Saturation, Venous: 75 % ( @ 18:32)  pH, Venous: 7.33 ( @ 14:08)  pCO2, Venous: 59 mmHg ( @ 14:08)  Oxygen Saturation, Venous: 69 % ( @ 14:08)    CXR no change, cultures neg      RADIOLOGY & ADDITIONAL STUDIES:    CRITICAL CARE TIME SPENT:

## 2020-08-29 NOTE — PROGRESS NOTE ADULT - ASSESSMENT
49 y/o M with hx of HTN and chronic LE edema , and cardiac murmur (since childhood), that presents from his outpatient PCP office for hypoxia and SOB. ICU consulted for hypoxic respiratory failure caused by pulmonary edema, now with improving respiratory status    PULMONARY   #Hypoxic Respiratory Failure  Pt presents with tachypnea and hypoxia, now with improved tachypnea and O2 saturation on high flow  - bipap stopped, continue high flow   - wean supplemental O2 as tolerated   -continue lasix 40mg BID    #Sepsis   Pt with tachypnea, tachycardia, and WBC > 19, CRP 11.88 with possible source being pulmonary.   - c/w Ceftriaxone and Azithromycin to cover CAP  - order HIV test (consented)   - f/u blood cx  - f/u urine cx  - hold off on fluid resuscitation at this time due to possible fluid overload state.     #r/o COVID  Ruled out, covid negative x3, low clinical suspicion     #Pulmonary Edema   CXR with bilateral pulmonary edema, with b/l LE edema, elevated proBNP and POCUS demonstrating B-lines. 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  - continue Lasix 40mg BID  - f/u echocardiogram   - monitor I/Os, monitor UOP carefully    CARDIOVSCULAR  #Hypertension hx  - At home takes valsartan hctz 320-25 combo, currently holding    RENAL  #Hyponatremia  Pt had presented with hyponatremia , likely hypervolemic hyponatremia evidenced by pulmonary edema and b/l LE edema, currently improved and at 137.    - trend BMP  - f/u serum osmolality   - f/u urine osmolality and Na    HEME  #Leukocytosis: most likely 2/2 infection vs. reactive  - Continue to trend CBC    F: Hold off due to fluid overloaded state  E: Replete K<4. Mg<2  N: NPO  Ppx: Lovenox  Dispo: ICU  CODE: FULL CODE     LINES AND DRAINS  LAST T&S: 8/29, additional ordered for next lab draw

## 2020-08-29 NOTE — CHART NOTE - NSCHARTNOTEFT_GEN_A_CORE
Admitting Diagnosis:   Patient is a 50y old  Male who presents with a chief complaint of SOB (28 Aug 2020 23:29)      Consult: Yes [   ]  No [ x  ]    Reason for Initial Nutrition Assessment: LOS       PAST MEDICAL & SURGICAL HISTORY:  HTN (hypertension)  No significant past surgical history      Current Nutrition Order:   NPO    PO Intake: Good (%) [   ]  Fair (50-75%) [   ] Poor (<25%) [   ]\  NA NPO     GI Issues:   BM8/27    Pain:  none per flow sheets     Skin Integrity:  4+ Edema (left leg;  right leg;  left knee;  right knee;  left ankle;  right ankle;  left foot)  No pressure ulcers  Julio 16       Labs:   08-29    135  |  90<L>  |  16  ----------------------------<  112<H>  3.2<L>   |  33<H>  |  0.96    Ca    9.0      29 Aug 2020 05:32  Phos  4.9     08-29  Mg     2.1     08-29    TPro  7.7  /  Alb  3.6  /  TBili  0.6  /  DBili  x   /  AST  20  /  ALT  33  /  AlkPhos  77  08-29    CAPILLARY BLOOD GLUCOSE        Nutritionally Pertinent Lab Values:  Na 135; low when admitted, K 3.2, BUN/Cr WDL, glucsoe 112, phos 4.9     Medications:  MEDICATIONS  (STANDING):  azithromycin  IVPB 500 milliGRAM(s) IV Intermittent every 24 hours  cefTRIAXone   IVPB 1000 milliGRAM(s) IV Intermittent every 24 hours  chlorhexidine 4% Liquid 1 Application(s) Topical <User Schedule>  enoxaparin Injectable 40 milliGRAM(s) SubCutaneous every 12 hours  potassium chloride    Tablet ER 40 milliEquivalent(s) Oral every 4 hours    MEDICATIONS  (PRN):      Admitted Anthropometrics:    Weight:  Daily Height in cm: 172.72 (28 Aug 2020 13:25)    Daily     Weight Change:     Nutrition Focused Physical Exam: Completed [   ]  Unable to complete [   ]  Muscle Wasting:  Subcutaneous Fat Wasting:    Estimated energy needs:     Subjective:   49 y/o M with hx of HTN and chronic LE edema, cardiac murmur (since childhood), hx of HFEF, that presents from his outpatient PCP office for hypoxia and SOB. Pt states for the past 1-2 weeks he has been experiencing anxiety which has resulted in insomnia. Pt presents with tachypnea and hypoxia to 70s on RA with improvement to 96% on NRB. 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 ICU consulted for hypoxic respiratory failure and r/o COVID. Cardiology consulted 2/2 Echo demonstrated reduced EF to 30-35% with global hypokinesis.     Nutrition Diagnosis:    [  ] No active nutrition diagnosis at this time  [  ] Current medical condition precludes nutrition intervention    Goal:    Recommendations:    Education:     Risk Level: High [   ] Moderate [   ] Low [   ] Admitting Diagnosis:   Patient is a 50y old  Male who presents with a chief complaint of SOB (28 Aug 2020 23:29)      Consult: Yes [   ]  No [ x  ]    Reason for Initial Nutrition Assessment: LOS       PAST MEDICAL & SURGICAL HISTORY:  HTN (hypertension)  No significant past surgical history      Current Nutrition Order:   DASH TLC  1000mlFR     PO Intake: Good (%) [   ]  Fair (50-75%) [   ] Poor (<25%) [   ]\  NA NPO     GI Issues:   BM8/27    Pain:  none per flow sheets     Skin Integrity:  4+ Edema (left leg;  right leg;  left knee;  right knee;  left ankle;  right ankle;  left foot)  No pressure ulcers  Julio 16       Labs:   08-29    135  |  90<L>  |  16  ----------------------------<  112<H>  3.2<L>   |  33<H>  |  0.96    Ca    9.0      29 Aug 2020 05:32  Phos  4.9     08-29  Mg     2.1     08-29    TPro  7.7  /  Alb  3.6  /  TBili  0.6  /  DBili  x   /  AST  20  /  ALT  33  /  AlkPhos  77  08-29    CAPILLARY BLOOD GLUCOSE        Nutritionally Pertinent Lab Values:  Na 135; low when admitted, K 3.2, BUN/Cr WDL, glucose 112, phos 4.9     Medications:  MEDICATIONS  (STANDING):  azithromycin  IVPB 500 milliGRAM(s) IV Intermittent every 24 hours  cefTRIAXone   IVPB 1000 milliGRAM(s) IV Intermittent every 24 hours  chlorhexidine 4% Liquid 1 Application(s) Topical <User Schedule>  enoxaparin Injectable 40 milliGRAM(s) SubCutaneous every 12 hours  potassium chloride    Tablet ER 40 milliEquivalent(s) Oral every 4 hours    MEDICATIONS  (PRN):      Admitted Anthropometrics:  5'8''  pounds +-10%  8/28 330 pounds   BMI 50.2  %AOR=813    Weight Change: Pt reports weighing 330 pounds at this time - reports this is Higher then his UBW (UBW not stated). +Wt gain per pt d/t "swelling." +4 Edema noted at this time     Nutrition Focused Physical Exam: Completed [   ]  Unable to complete [  X ]    Estimated energy needs:   IBW used to calculate energy needs due to pt's current body weight exceeding 120% of IBW   Adjusted for age, BMI, possible COVID  ~1800kcal/day 25kcal/kg  ~85-110gm/day 1.2-1.4gm/kg  Fluids per team     Subjective:   49 y/o M with hx of HTN and chronic LE edema, cardiac murmur (since childhood), hx of HFEF, that presents from his outpatient PCP office for hypoxia and SOB. Pt states for the past 1-2 weeks he has been experiencing anxiety which has resulted in insomnia. Pt presents with tachypnea and hypoxia to 70s on RA with improvement to 96% on NRB. 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 ICU consulted for hypoxic respiratory failure and r/o COVID. Cardiology consulted 2/2 Echo demonstrated reduced EF to 30-35% with global hypokinesis.   Spoke with RN. Face to Face interview deferred 2/2 COVID isolation, Spoke with pt via Phone. Reports PTA limiting fried food, consuming sandwiches (deli meat, cheese). Pt does own cooking at home and uses some salt or MRS DASH. Will use fresh food, no canned foods. Good PO intake, NKFA. No issues chewing/swallowing. Pt ordered for DASH TLC diet + 1000ml FR at this time, of note pt had been NPO 2/2 BiPAP use. PO diet order just placed + Pt Reports placing meal order, however has not yet eaten.   Please see below for nutritions recommendations. Recs made with team.     Nutrition Diagnosis: Inadequate energy intake RT intake<EER AEB Pt had been NPO, diet just advanced and Pending meal   Goal: Pt will meet at least 75% of nutrient needs     Recommendations:  1. Monitor PO intake/appetite, GI distress, diet tolerance, labs, weights.  2. Honor pt food preferences as able.  3. RD to remain available for additional nutrition interventions as needed.     Education: Discussed importance of choosing low salt foods, following MD determined fluid restrictions. Reviewed salt alteratives when cooking. Reviewed foods high/low in salt. Understanding stated, provide additional motivation as needed.     Risk Level: High [   ] Moderate [  X ] Low [   ]

## 2020-08-30 DIAGNOSIS — J81.1 CHRONIC PULMONARY EDEMA: ICD-10-CM

## 2020-08-30 DIAGNOSIS — R63.8 OTHER SYMPTOMS AND SIGNS CONCERNING FOOD AND FLUID INTAKE: ICD-10-CM

## 2020-08-30 DIAGNOSIS — J96.91 RESPIRATORY FAILURE, UNSPECIFIED WITH HYPOXIA: ICD-10-CM

## 2020-08-30 DIAGNOSIS — A41.9 SEPSIS, UNSPECIFIED ORGANISM: ICD-10-CM

## 2020-08-30 DIAGNOSIS — I10 ESSENTIAL (PRIMARY) HYPERTENSION: ICD-10-CM

## 2020-08-30 DIAGNOSIS — E87.1 HYPO-OSMOLALITY AND HYPONATREMIA: ICD-10-CM

## 2020-08-30 LAB
ALBUMIN SERPL ELPH-MCNC: 3.1 G/DL — LOW (ref 3.3–5)
ALP SERPL-CCNC: 68 U/L — SIGNIFICANT CHANGE UP (ref 40–120)
ALT FLD-CCNC: 31 U/L — SIGNIFICANT CHANGE UP (ref 10–45)
ANION GAP SERPL CALC-SCNC: 10 MMOL/L — SIGNIFICANT CHANGE UP (ref 5–17)
AST SERPL-CCNC: 21 U/L — SIGNIFICANT CHANGE UP (ref 10–40)
BASOPHILS # BLD AUTO: 0.04 K/UL — SIGNIFICANT CHANGE UP (ref 0–0.2)
BASOPHILS NFR BLD AUTO: 0.3 % — SIGNIFICANT CHANGE UP (ref 0–2)
BILIRUB SERPL-MCNC: 0.4 MG/DL — SIGNIFICANT CHANGE UP (ref 0.2–1.2)
BUN SERPL-MCNC: 18 MG/DL — SIGNIFICANT CHANGE UP (ref 7–23)
CALCIUM SERPL-MCNC: 9 MG/DL — SIGNIFICANT CHANGE UP (ref 8.4–10.5)
CHLORIDE SERPL-SCNC: 93 MMOL/L — LOW (ref 96–108)
CO2 SERPL-SCNC: 35 MMOL/L — HIGH (ref 22–31)
CREAT SERPL-MCNC: 0.96 MG/DL — SIGNIFICANT CHANGE UP (ref 0.5–1.3)
CULTURE RESULTS: NO GROWTH — SIGNIFICANT CHANGE UP
EOSINOPHIL # BLD AUTO: 0.1 K/UL — SIGNIFICANT CHANGE UP (ref 0–0.5)
EOSINOPHIL NFR BLD AUTO: 0.7 % — SIGNIFICANT CHANGE UP (ref 0–6)
GLUCOSE SERPL-MCNC: 104 MG/DL — HIGH (ref 70–99)
HCT VFR BLD CALC: 33.1 % — LOW (ref 39–50)
HGB BLD-MCNC: 9.4 G/DL — LOW (ref 13–17)
IMM GRANULOCYTES NFR BLD AUTO: 0.6 % — SIGNIFICANT CHANGE UP (ref 0–1.5)
LYMPHOCYTES # BLD AUTO: 1.42 K/UL — SIGNIFICANT CHANGE UP (ref 1–3.3)
LYMPHOCYTES # BLD AUTO: 10.5 % — LOW (ref 13–44)
MAGNESIUM SERPL-MCNC: 2.5 MG/DL — SIGNIFICANT CHANGE UP (ref 1.6–2.6)
MCHC RBC-ENTMCNC: 20.6 PG — LOW (ref 27–34)
MCHC RBC-ENTMCNC: 28.4 GM/DL — LOW (ref 32–36)
MCV RBC AUTO: 72.6 FL — LOW (ref 80–100)
MONOCYTES # BLD AUTO: 1.47 K/UL — HIGH (ref 0–0.9)
MONOCYTES NFR BLD AUTO: 10.9 % — SIGNIFICANT CHANGE UP (ref 2–14)
NEUTROPHILS # BLD AUTO: 10.4 K/UL — HIGH (ref 1.8–7.4)
NEUTROPHILS NFR BLD AUTO: 77 % — SIGNIFICANT CHANGE UP (ref 43–77)
NRBC # BLD: 0 /100 WBCS — SIGNIFICANT CHANGE UP (ref 0–0)
PHOSPHATE SERPL-MCNC: 3.6 MG/DL — SIGNIFICANT CHANGE UP (ref 2.5–4.5)
PLATELET # BLD AUTO: 339 K/UL — SIGNIFICANT CHANGE UP (ref 150–400)
POTASSIUM SERPL-MCNC: 4 MMOL/L — SIGNIFICANT CHANGE UP (ref 3.5–5.3)
POTASSIUM SERPL-SCNC: 4 MMOL/L — SIGNIFICANT CHANGE UP (ref 3.5–5.3)
PROT SERPL-MCNC: 7 G/DL — SIGNIFICANT CHANGE UP (ref 6–8.3)
RBC # BLD: 4.56 M/UL — SIGNIFICANT CHANGE UP (ref 4.2–5.8)
RBC # FLD: 20.1 % — HIGH (ref 10.3–14.5)
SODIUM SERPL-SCNC: 138 MMOL/L — SIGNIFICANT CHANGE UP (ref 135–145)
SPECIMEN SOURCE: SIGNIFICANT CHANGE UP
TROPONIN T SERPL-MCNC: 0.02 NG/ML — HIGH (ref 0–0.01)
WBC # BLD: 13.51 K/UL — HIGH (ref 3.8–10.5)
WBC # FLD AUTO: 13.51 K/UL — HIGH (ref 3.8–10.5)

## 2020-08-30 PROCEDURE — 99233 SBSQ HOSP IP/OBS HIGH 50: CPT

## 2020-08-30 RX ORDER — FUROSEMIDE 40 MG
40 TABLET ORAL DAILY
Refills: 0 | Status: DISCONTINUED | OUTPATIENT
Start: 2020-08-30 | End: 2020-08-31

## 2020-08-30 RX ORDER — FUROSEMIDE 40 MG
20 TABLET ORAL ONCE
Refills: 0 | Status: COMPLETED | OUTPATIENT
Start: 2020-08-30 | End: 2020-08-30

## 2020-08-30 RX ADMIN — AZITHROMYCIN 255 MILLIGRAM(S): 500 TABLET, FILM COATED ORAL at 15:02

## 2020-08-30 RX ADMIN — CEFTRIAXONE 100 MILLIGRAM(S): 500 INJECTION, POWDER, FOR SOLUTION INTRAMUSCULAR; INTRAVENOUS at 14:32

## 2020-08-30 RX ADMIN — Medication 40 MILLIGRAM(S): at 06:24

## 2020-08-30 RX ADMIN — ENOXAPARIN SODIUM 40 MILLIGRAM(S): 100 INJECTION SUBCUTANEOUS at 06:24

## 2020-08-30 RX ADMIN — Medication 20 MILLIGRAM(S): at 17:33

## 2020-08-30 RX ADMIN — LISINOPRIL 2.5 MILLIGRAM(S): 2.5 TABLET ORAL at 06:24

## 2020-08-30 RX ADMIN — ENOXAPARIN SODIUM 40 MILLIGRAM(S): 100 INJECTION SUBCUTANEOUS at 17:33

## 2020-08-30 NOTE — PROGRESS NOTE ADULT - PROBLEM SELECTOR PLAN 3
CXR showed bilateral pulmonary edema, exam notable for b/l LE edema, elevated pro-BNP and POCUS demonstrating B-lines. Pulmonary edema possibly 2/2 to decompensated CHF due to underlying infectious etiology  - c/w Lasix 40mg PO daily   -TTE 8/28: trace tricuspid regurgitation. PASP is 76 mmHg. The right ventricle is probably dilated. Reduced RV systolic function. LV function mod-severely reduced with EF 30-35% with global hypokinesis. Trivial pericardial effusion.  - c/w lasix 40 mg PO  - c/w lisinopril 2.5 mg PO  - Per cardiology, trend cardiac enzymes to peak, daily EKGs  - monitor I/Os, monitor UOP carefully. Goal net negative 2 L. Currently -2.3L over 24h

## 2020-08-30 NOTE — PROGRESS NOTE ADULT - PROBLEM SELECTOR PLAN 3
CXR shows bilateral pulmonary edema, with b/l LE edema, elevated pro-BNP and POCUS demonstrating B-lines. Pulmonary edema possibly 2/2 to decompensated CHF vs. viral cardiomyopathy  - C/w Lasix 40mg PO daily   -TTE 8/28: trace tricuspid regurgitation. PASP is 76 mmHg. The right ventricle is probably dilated. Right ventricular systolic function is reduced. The tricuspid annular plane systolic excursion (TAPSE) is 14.00 mm (normal >=17 mm). RV tissue Doppler S' is 16.00 cm/s (normal >10 cm/s). Grossly, left ventricular function appears moderate to severely reduced with a calculated ejection fraction of 30-35% with global hypokinesis. Trivial pericardial effusion.  -Cardiology following, appreciate recs: switch to Lasix 40 PO daily, start Lisinopril 2.5, trend cardiac enzymes to peak, daily EKGs  - monitor I/Os, monitor UOP carefully. Goal net negative 2 L. Currently -1535 mL over 8/29

## 2020-08-30 NOTE — PROGRESS NOTE ADULT - PROBLEM SELECTOR PLAN 5
Patient with history of HTN, at home takes valsartan hctz 320-25 combo  -Currently hold Valsartan  -Start Lisinopril 2.5 mg daily   -Monitor BPs (currently stable at 136/76)

## 2020-08-30 NOTE — PROGRESS NOTE ADULT - SUBJECTIVE AND OBJECTIVE BOX
HOSPITAL COURSE:   51 y/o M with hx of HTN and chronic LE edema , and cardiac murmur (since childhood), that presents from his outpatient PCP office for hypoxia and SOB. In the ED T: 100.1 Rectal HR: 101-> 95 BP: 152/95 RR: 22 Saturating 79% on RA and 96% on NRB . Labs remarkable for WBC 19.73, Hgb 10.6, Plt 431, Na 129, Cl 87, CRP 11.88,  Procalcitonin .12, Trop .03, proBNP > 2000, . VBG demonstrating CXR demonstrating bilateral pulmonary infiltrate/edema. ICU consulted for hypoxic respiratory failure and r/o COVID. Repeat covid swabs were negative x3. Patient was placed on bipap overnight, given lasix 40mg, and started of ceftriaxone and azithromycin for PNA. His tachypnea improved on the bipap and he was placed on high flow. Standing lasix was ordered for 40mg BID.     OVERNIGHT EVENTS: no acute events since being stepped down to 7-Lach. Patient is comfortable on high flow.    SUBJECTIVE / INTERVAL HPI: Patient seen and examined at bedside. Patient is in no acute distress and reports feeling well at this time. He reports his breathing has greatly improved since admission, and he feeling comfortable on the high flow nasal canula. Denies SOB or chest pain, fevers, chills, cough, N/V/D, abdominal pain, leg pain. Patient states he is able to ambulate to the bathroom without difficulty or SOB.    VITAL SIGNS:  Vital Signs Last 24 Hrs  T(C): 37.1 (30 Aug 2020 00:00), Max: 37.3 (29 Aug 2020 15:00)  T(F): 98.7 (30 Aug 2020 00:00), Max: 99.2 (29 Aug 2020 17:41)  HR: 86 (30 Aug 2020 01:41) (67 - 94)  BP: 116/70 (30 Aug 2020 00:00) (111/75 - 145/83)  BP(mean): 93 (29 Aug 2020 19:00) (83 - 108)  RR: 27 (30 Aug 2020 01:41) (26 - 40)  SpO2: 99% (30 Aug 2020 01:41) (94% - 99%)    PHYSICAL EXAM:    General: resting comfortably in bed, in NAD.   HEENT: NC/AT; PERRL, EOMI, anicteric sclera; MMM  Neck: supple  Cardiovascular: +S1/S2; RRR  Respiratory: decreased breath sounds b/l at the bases, tachypneic, no wheezes, rhonchi or rales appreciated. Patient is comfortable and saturating 99% on high flow, able to speak in full sentences, and in no respiratory distress. No accessory muscle use or retractions.  Gastrointestinal: soft, NT/ND; +BSx4, no rigidity, guarding, or rebound.   Extremities: WWP; b/l LE edema (patient reports this is chronic and occurs in the summer months), no clubbing or cyanosis   Vascular: 2+ radial, DP/PT pulses B/L  Neurological: AAOx3; no focal deficits    MEDICATIONS:  MEDICATIONS  (STANDING):  azithromycin  IVPB 500 milliGRAM(s) IV Intermittent every 24 hours  cefTRIAXone   IVPB 1000 milliGRAM(s) IV Intermittent every 24 hours  chlorhexidine 4% Liquid 1 Application(s) Topical <User Schedule>  enoxaparin Injectable 40 milliGRAM(s) SubCutaneous every 12 hours  furosemide   Injectable 40 milliGRAM(s) IV Push every 12 hours  lisinopril 2.5 milliGRAM(s) Oral every 24 hours    MEDICATIONS  (PRN):    ALLERGIES:  Allergies    No Known Allergies    Intolerances        LABS:                        10.0   17.76 )-----------( 341      ( 29 Aug 2020 05:32 )             33.9     08-    137  |  91<L>  |  17  ----------------------------<  105<H>  3.7   |  35<H>  |  0.94    Ca    9.0      29 Aug 2020 13:10  Phos  3.4     -  Mg     2.2     -    TPro  7.3  /  Alb  3.5  /  TBili  0.6  /  DBili  x   /  AST  21  /  ALT  32  /  AlkPhos  72  08-    PT/INR - ( 29 Aug 2020 05:32 )   PT: 15.1 sec;   INR: 1.27          PTT - ( 29 Aug 2020 05:32 )  PTT:26.9 sec  Urinalysis Basic - ( 28 Aug 2020 19:03 )    Color: Yellow / Appearance: Clear / S.025 / pH: x  Gluc: x / Ketone: NEGATIVE  / Bili: Negative / Urobili: 0.2 E.U./dL   Blood: x / Protein: 100 mg/dL / Nitrite: NEGATIVE   Leuk Esterase: NEGATIVE / RBC: < 5 /HPF / WBC 5-10 /HPF   Sq Epi: x / Non Sq Epi: 0-5 /HPF / Bacteria: Present /HPF      CAPILLARY BLOOD GLUCOSE      RADIOLOGY & ADDITIONAL TESTS: Reviewed. HOSPITAL COURSE:   49 y/o M with hx of HTN and chronic LE edema , and cardiac murmur (since childhood), that presents from his outpatient PCP office for hypoxia and SOB. In the ED T: 100.1 Rectal HR: 101-> 95 BP: 152/95 RR: 22 Saturating 79% on RA and 96% on NRB . Labs remarkable for WBC 19.73, Hgb 10.6, Plt 431, Na 129, Cl 87, CRP 11.88,  Procalcitonin .12, Trop .03, proBNP > 2000, . VBG demonstrating CXR demonstrating bilateral pulmonary infiltrate/edema. ICU consulted for hypoxic respiratory failure and r/o COVID. Repeat covid swabs were negative x3. Patient was placed on bipap overnight, given lasix 40mg, and started of ceftriaxone and azithromycin for PNA. His tachypnea improved on the bipap and he was placed on high flow. Standing lasix was ordered for 40mg BID.     OVERNIGHT EVENTS: no acute events since being stepped down to 7-Lach. Patient is comfortable on high flow.    SUBJECTIVE / INTERVAL HPI: Patient seen and examined at bedside. Patient is in no acute distress and reports feeling well at this time. He reports his breathing has greatly improved since admission, and he feeling comfortable on the high flow nasal canula. Denies SOB or chest pain, fevers, chills, cough, N/V/D, abdominal pain, leg pain. Patient states he is able to ambulate to the bathroom without difficulty or SOB.    VITAL SIGNS:  Vital Signs Last 24 Hrs  T(C): 37.1 (30 Aug 2020 00:00), Max: 37.3 (29 Aug 2020 15:00)  T(F): 98.7 (30 Aug 2020 00:00), Max: 99.2 (29 Aug 2020 17:41)  HR: 86 (30 Aug 2020 01:41) (67 - 94)  BP: 116/70 (30 Aug 2020 00:00) (111/75 - 145/83)  BP(mean): 93 (29 Aug 2020 19:00) (83 - 108)  RR: 27 (30 Aug 2020 01:41) (26 - 40)  SpO2: 99% (30 Aug 2020 01:41) (94% - 99%)    PHYSICAL EXAM:    General: resting comfortably in bed, in NAD.   HEENT: NC/AT; PERRL, EOMI, anicteric sclera; MMM  Neck: supple  Cardiovascular: +S1/S2; RRR  Respiratory: decreased breath sounds b/l at the bases, tachypneic, no wheezes, rhonchi or rales appreciated. Patient is comfortable and saturating 99% on high flow, able to speak in full sentences, and in no respiratory distress. No accessory muscle use or retractions.  Gastrointestinal: soft, NT/ND; +BSx4, no rigidity, guarding, or rebound.   Extremities: WWP; b/l LE edema (patient reports this is chronic and occurs in the summer months), no clubbing or cyanosis   Vascular: 2+ radial, DP/PT pulses B/L  Neurological: AAOx3; no focal deficits    MEDICATIONS:  MEDICATIONS  (STANDING):  azithromycin  IVPB 500 milliGRAM(s) IV Intermittent every 24 hours  cefTRIAXone   IVPB 1000 milliGRAM(s) IV Intermittent every 24 hours  chlorhexidine 4% Liquid 1 Application(s) Topical <User Schedule>  enoxaparin Injectable 40 milliGRAM(s) SubCutaneous every 12 hours  furosemide   Injectable 40 milliGRAM(s) IV Push every 12 hours  lisinopril 2.5 milliGRAM(s) Oral every 24 hours    MEDICATIONS  (PRN):    ALLERGIES:  Allergies    No Known Allergies    Intolerances        LABS:                        10.0   17.76 )-----------( 341      ( 29 Aug 2020 05:32 )             33.9     08-    137  |  91<L>  |  17  ----------------------------<  105<H>  3.7   |  35<H>  |  0.94    Ca    9.0      29 Aug 2020 13:10  Phos  3.4     -  Mg     2.2     -    TPro  7.3  /  Alb  3.5  /  TBili  0.6  /  DBili  x   /  AST  21  /  ALT  32  /  AlkPhos  72  08-    PT/INR - ( 29 Aug 2020 05:32 )   PT: 15.1 sec;   INR: 1.27          PTT - ( 29 Aug 2020 05:32 )  PTT:26.9 sec  Urinalysis Basic - ( 28 Aug 2020 19:03 )    Color: Yellow / Appearance: Clear / S.025 / pH: x  Gluc: x / Ketone: NEGATIVE  / Bili: Negative / Urobili: 0.2 E.U./dL   Blood: x / Protein: 100 mg/dL / Nitrite: NEGATIVE   Leuk Esterase: NEGATIVE / RBC: < 5 /HPF / WBC 5-10 /HPF   Sq Epi: x / Non Sq Epi: 0-5 /HPF / Bacteria: Present /HPF      CAPILLARY BLOOD GLUCOSE      RADIOLOGY & ADDITIONAL TESTS: Reviewed.  < from: Xray Chest 1 View- PORTABLE-Urgent (20 @ 07:38) >  < from: CT Angio Chest PE Protocol w/ IV Cont (20 @ 19:43) >  FINDINGS:    LUNGS AND AIRWAYS: Patent central airways.  Extensive confluent central airspace opacities.  PLEURA: Small right pleural effusion.  MEDIASTINUM AND KYE: No lymphadenopathy.  VESSELS: No pulmonary embolism.  HEART: Cardiomegaly. No pericardial effusion.  CHEST WALL AND LOWER NECK: Within normal limits.  VISUALIZED UPPER ABDOMEN: Within normal limits.  BONES: Within normal limits.    < end of copied text >    IMPRESSION: Congestion and/or infiltrates. Right effusion    < end of copied text >  < from: TTE Limited Echo w/o Cont (20 @ 16:06) >  CONCLUSIONS:     1. Limited study obtained for evaluation of valvular function.   2. Technically difficult study.   3. No significant valvular disease.   4. Patient tachycardic during the study.   5. There is trace tricuspid regurgitation. Pulmonary artery systolic pressure (estimated using the tricuspid regurgitant gradient and an estimate of right atrial pressure) is 76 mmHg.   6. Right ventricular size cannot be determined with accuracy. The right ventricle is probably dilated. Right ventricular systolic function is reduced. The tricuspid annular plane systolic excursion (TAPSE) is 14.00 mm (normal >=17 mm). RV tissue Doppler S' is 16.00 cm/s (normal >10 cm/s).   7. Left ventricular endocardium is not well visualized. Grossly, left ventricular function appearsmoderate to severely reduced with a calculated ejection fraction of 30-35% with global hypokinesis.   8. Trivial pericardial effusion.    < end of copied text >

## 2020-08-30 NOTE — PROGRESS NOTE ADULT - ASSESSMENT
49 y/o M with PMHx HTN, cardiac murmur (since childhood), and chronic LE edema who presents from his outpatient PCP office for hypoxia and SOB. Found to have hypoxic respiratory failure likely 2/2 CHF exacerbation from underlying CAP, now with improving respiratory status. 49 y/o M with PMHx HTN, cardiac murmur (since childhood), and chronic LE edema who presents from his outpatient PCP office for hypoxia and SOB. Found to have hypoxic respiratory failure with pulmonary edema and biventricular failure and possibly CAP, currently improving.

## 2020-08-30 NOTE — PROGRESS NOTE ADULT - SUBJECTIVE AND OBJECTIVE BOX
Patient is a 50y old  Male who presents with a chief complaint of SOB (30 Aug 2020 06:20)        INTERVAL HPI/OVERNIGHT EVENTS: none    SYMPTOMS feels better, no sob    DRIPS none        ICU Vital Signs Last 24 Hrs  T(C): 37.1 (30 Aug 2020 05:25), Max: 37.3 (29 Aug 2020 15:00)  T(F): 98.8 (30 Aug 2020 05:25), Max: 99.2 (29 Aug 2020 17:41)  HR: 84 (30 Aug 2020 05:25) (67 - 94)  BP: 132/73 (30 Aug 2020 05:25) (111/75 - 136/76)  BP(mean): 93 (29 Aug 2020 19:00) (83 - 104)  ABP: --  ABP(mean): --  RR: 27 (30 Aug 2020 05:25) (25 - 34)  SpO2: 99% (30 Aug 2020 05:25) (94% - 99%)      I&O's Summary    29 Aug 2020 07:01  -  30 Aug 2020 07:00  --------------------------------------------------------  IN: 765 mL / OUT: 2300 mL / NET: -1535 mL        EXAM    Chest rales less    Heart rr    Abdomen soft nontender with bs    Extremities edema    Neuro awake, alert      LABS:                            9.4    13.51 )-----------( 339      ( 30 Aug 2020 06:00 )             33.1     08-30    138  |  93<L>  |  18  ----------------------------<  104<H>  4.0   |  35<H>  |  0.96    Ca    9.0      30 Aug 2020 06:00  Phos  3.6     08-30  Mg     2.5     08-30    TPro  7.0  /  Alb  3.1<L>  /  TBili  0.4  /  DBili  x   /  AST  21  /  ALT  31  /  AlkPhos  68  08-30    PT/INR - ( 29 Aug 2020 05:32 )   PT: 15.1 sec;   INR: 1.27          PTT - ( 29 Aug 2020 05:32 )  PTT:26.9 sec  Urinalysis Basic - ( 28 Aug 2020 19:03 )    Color: Yellow / Appearance: Clear / S.025 / pH: x  Gluc: x / Ketone: NEGATIVE  / Bili: Negative / Urobili: 0.2 E.U./dL   Blood: x / Protein: 100 mg/dL / Nitrite: NEGATIVE   Leuk Esterase: NEGATIVE / RBC: < 5 /HPF / WBC 5-10 /HPF   Sq Epi: x / Non Sq Epi: 0-5 /HPF / Bacteria: Present /HPF            RADIOLOGY & ADDITIONAL STUDIES:    CRITICAL CARE TIME SPENT: Patient is a 50y old  Male who presents with a chief complaint of SOB (30 Aug 2020 06:20)        INTERVAL HPI/OVERNIGHT EVENTS: none    SYMPTOMS feels better, no sob    DRIPS none        ICU Vital Signs Last 24 Hrs  T(C): 37.1 (30 Aug 2020 05:25), Max: 37.3 (29 Aug 2020 15:00)  T(F): 98.8 (30 Aug 2020 05:25), Max: 99.2 (29 Aug 2020 17:41)  HR: 84 (30 Aug 2020 05:25) (67 - 94)  BP: 132/73 (30 Aug 2020 05:25) (111/75 - 136/76)  BP(mean): 93 (29 Aug 2020 19:00) (83 - 104)  ABP: --  ABP(mean): --  RR: 27 (30 Aug 2020 05:25) (25 - 34)  SpO2: 99% (30 Aug 2020 05:25) (94% - 99%)      I&O's Summary    29 Aug 2020 07:01  -  30 Aug 2020 07:00  --------------------------------------------------------  IN: 765 mL / OUT: 2300 mL / NET: -1535 mL        EXAM    Chest rales less    Heart rr    Abdomen soft nontender with bs    Extremities edema    Neuro awake, alert      LABS:                            9.4    13.51 )-----------( 339      ( 30 Aug 2020 06:00 )             33.1     08-30    138  |  93<L>  |  18  ----------------------------<  104<H>  4.0   |  35<H>  |  0.96    Ca    9.0      30 Aug 2020 06:00  Phos  3.6     08-30  Mg     2.5     08-30    TPro  7.0  /  Alb  3.1<L>  /  TBili  0.4  /  DBili  x   /  AST  21  /  ALT  31  /  AlkPhos  68  08-30    PT/INR - ( 29 Aug 2020 05:32 )   PT: 15.1 sec;   INR: 1.27        culture neg  PTT - ( 29 Aug 2020 05:32 )  PTT:26.9 sec  Urinalysis Basic - ( 28 Aug 2020 19:03 )    Color: Yellow / Appearance: Clear / S.025 / pH: x  Gluc: x / Ketone: NEGATIVE  / Bili: Negative / Urobili: 0.2 E.U./dL   Blood: x / Protein: 100 mg/dL / Nitrite: NEGATIVE   Leuk Esterase: NEGATIVE / RBC: < 5 /HPF / WBC 5-10 /HPF   Sq Epi: x / Non Sq Epi: 0-5 /HPF / Bacteria: Present /HPF            RADIOLOGY & ADDITIONAL STUDIES:    CRITICAL CARE TIME SPENT:

## 2020-08-30 NOTE — PROGRESS NOTE ADULT - ASSESSMENT
A - biventricular failure low ef/ pulmonary edema/ hypoxic respiratory failure/ morbid obesity/ SHONA    Suggest:    continued diureis  increase lisnopril  taper off high lfow  night time BIPAP  pulmonary to see for shona in am  follow K A - biventricular failure low ef/ pulmonary edema/ hypoxic respiratory failure/ morbid obesity/ SHONA/possible pneumonia    Suggest:    continued diureis  increase lisnopril  taper off high lfow  night time BIPAP  pulmonary to see for shona in am  follow K  5 days ABX

## 2020-08-30 NOTE — PROGRESS NOTE ADULT - PROBLEM SELECTOR PLAN 1
Patient presents initially with tachypnea and hypoxia, now with improved tachypnea and O2 saturation on high flow. Patient remains tachypneic, but other vitals have been stable.  -Bipap stopped, continue high flow (40 L/min, 50% O2). Patient currently saturating 99% and is in no respiratory distress.  -Wean supplemental O2 as tolerated in AM   -C/w Lasix 40mg PO daily  -CXR 8/29 shows congestion and/or infiltrates and a right effusion  -CT Angio 8/28: No pulmonary embolism. Pulmonary edema or ARDS.

## 2020-08-30 NOTE — PROGRESS NOTE ADULT - PROBLEM SELECTOR PLAN 4
Patient had presented with hyponatremia , likely hypervolemic hyponatremia evidenced by pulmonary edema and b/l LE edema, currently improved and at 137 on 8/29.    - Na 137 today  - trend BMP  - f/u serum osmolality   - f/u urine osmolality

## 2020-08-30 NOTE — PROGRESS NOTE ADULT - PROBLEM SELECTOR PLAN 2
Patient initially presented with tachypnea, tachycardia, febrile 100.1 rectally, and WBC > 19, CRP 11.88 likely 2/2 possible CAP vs. viral cardiomyopathy vs. pulmonary edema   - c/w Ceftriaxone 1 g for 5 days (8/29 - ) and Azithromycin 500 mg for 3 days (8/29 - ) to cover CAP  - f/u blood cx - NGTD   - Urine cx NGTD   - UA negative  - hold off on fluid resuscitation at this time due to possible fluid overload state.  - Covid negative x3. F/u Covid antibodies   - HIV negative Patient initially presented with tachypnea, tachycardia, febrile 100.1 rectally, and WBC > 19, CRP 11.88 likely 2/2 possible CAP vs. viral cardiomyopathy vs. pulmonary edema   - Afebrile overnight, WBC continues to downtrend (13.51 today)  - c/w Ceftriaxone 1 g for 5 days (8/29 - ) and Azithromycin 500 mg for 3 days (8/29 - ) to cover CAP  - f/u blood cx - NGTD   - Urine cx NGTD   - UA negative  - hold off on fluid resuscitation at this time due to possible fluid overload state.  - Covid negative x3. F/u Covid antibodies   - HIV negative

## 2020-08-30 NOTE — PROGRESS NOTE ADULT - PROBLEM SELECTOR PLAN 4
Patient had presented with hyponatremia , likely hypervolemic hyponatremia evidenced by pulmonary edema and b/l LE edema, currently improved and at 137 on 8/29.    - trend BMP  - f/u serum osmolality   - f/u urine osmolality and Na

## 2020-08-30 NOTE — PROGRESS NOTE ADULT - ASSESSMENT
49 y/o M with hx of HTN and chronic LE edema , and cardiac murmur (since childhood), that presents from his outpatient PCP office for hypoxia and SOB. ICU consulted initially for hypoxic respiratory failure caused by pulmonary edema, now with improving respiratory status and stable to be stepped down to tele for further monitoring.

## 2020-08-30 NOTE — PROGRESS NOTE ADULT - PROBLEM SELECTOR PLAN 5
Patient with history of HTN, at home takes valsartan hctz 320-25 combo  -Currently hold Valsartan  -c/w Lisinopril 2.5 mg daily   -Monitor BPs (currently stable at 136/76)

## 2020-08-30 NOTE — PROGRESS NOTE ADULT - PROBLEM SELECTOR PLAN 1
Patient presented initially with tachypnea and hypoxia, now with improved tachypnea and O2 saturation on HFNC. Patient remains tachypneic, but other vitals have been stable.  -c/w high flow (40 L/min, 50% O2). Patient currently saturating 99% and w/o respiratory distress  -Wean to 6L NC today as tolerated  -c/w Lasix 40mg PO daily  -c/w lisinopril 2.5 mg daily, monitor K+ levels  -CXR 8/29 shows congestion and/or infiltrates and a right effusion  -CT Angio 8/28: No pulmonary embolism. Pulmonary edema or ARDS. Patient presented initially with tachypnea and hypoxia, now with improved tachypnea and O2 saturation on HFNC. Patient remains tachypneic, but other vitals have been stable.  -c/w high flow (40 L/min, 50% O2). Patient currently saturating 99% and w/o respiratory distress  -Wean to 6L NC today as tolerated  -Place on BiPAP overnight  -c/w Lasix 40mg PO daily  -c/w lisinopril 2.5 mg daily, monitor K+ levels  -CXR 8/29 shows congestion and/or infiltrates and a right effusion  -CT Angio 8/28: No pulmonary embolism. Pulmonary edema or ARDS.

## 2020-08-30 NOTE — PROGRESS NOTE ADULT - PROBLEM SELECTOR PLAN 6
F: Hold off due to fluid overloaded state  E: Replete K<4. Mg<2  N: DASH/TLC diet   Ppx: Lovenox  Dispo: tele  CODE: FULL CODE

## 2020-08-30 NOTE — PROGRESS NOTE ADULT - SUBJECTIVE AND OBJECTIVE BOX
INTERVAL HPI/OVERNIGHT EVENTS:  As per night team, Patient placed on HFNC 40L/50% overnight. Patient seen and examined at bedside. Said he has been tolerating the HFNC without any shortness of breath and chest pain. He admits that the lower extremity swelling has improved by 33% since admission. Similar to prior questioning, the swelling typically gets worse in the summer months. On further questioning, he admits to left shoulder achiness but says it is likely positional from sleeping. He denies any jaw claudication and exacerbation with exertion. Patient denies fever, chills, dizziness, weakness, HA, CP, SOB, N/V/D/C, changes in bowel movements.    VITALS  Vital Signs Last 24 Hrs  T(C): 37.1 (30 Aug 2020 05:25), Max: 37.3 (29 Aug 2020 15:00)  T(F): 98.8 (30 Aug 2020 05:25), Max: 99.2 (29 Aug 2020 17:41)  HR: 84 (30 Aug 2020 08:08) (67 - 94)  BP: 118/71 (30 Aug 2020 08:08) (111/75 - 136/76)  BP(mean): 88 (30 Aug 2020 08:08) (83 - 93)  RR: 20 (30 Aug 2020 08:08) (20 - 30)  SpO2: 97% (30 Aug 2020 08:08) (94% - 99%)    CAPILLARY BLOOD GLUCOSE          PHYSICAL EXAM  General: Obese gentleman, sitting upright in bed in [facility], on HFNC   HEENT: NC/AT, PERRL/ EOMI, no scleral icterus, MMM  Neck: Supple; no JVD  Respiratory: decreased basilar breath sounds with mild right rales, good inspiratory effort, no wheezing noted   Cardiovascular: Regular rhythm/rate; +S1 +S2  Gastrointestinal: obese abdomen, Soft, NTND, normoactive BS, no rebound, no guarding  Extremities: WWP; 2+ b/l pitting edema at the feet and ankles, 1+ above ankles; no clubbing, pulses equal  Neurological: A&Ox3, no obvious focal deficits, follows commands  Skin: Normal temperature, warm, dry    MEDICATIONS  (STANDING):  azithromycin  IVPB 500 milliGRAM(s) IV Intermittent every 24 hours  cefTRIAXone   IVPB 1000 milliGRAM(s) IV Intermittent every 24 hours  enoxaparin Injectable 40 milliGRAM(s) SubCutaneous every 12 hours  furosemide    Tablet 40 milliGRAM(s) Oral daily  lisinopril 2.5 milliGRAM(s) Oral every 24 hours    MEDICATIONS  (PRN):      No Known Allergies      LABS                        9.4    13.51 )-----------( 339      ( 30 Aug 2020 06:00 )             33.1     08-30    138  |  93<L>  |  18  ----------------------------<  104<H>  4.0   |  35<H>  |  0.96    Ca    9.0      30 Aug 2020 06:00  Phos  3.6     08-30  Mg     2.5     08-30    TPro  7.0  /  Alb  3.1<L>  /  TBili  0.4  /  DBili  x   /  AST  21  /  ALT  31  /  AlkPhos  68  08-30    PT/INR - ( 29 Aug 2020 05:32 )   PT: 15.1 sec;   INR: 1.27          PTT - ( 29 Aug 2020 05:32 )  PTT:26.9 sec  Urinalysis Basic - ( 28 Aug 2020 19:03 )    Color: Yellow / Appearance: Clear / S.025 / pH: x  Gluc: x / Ketone: NEGATIVE  / Bili: Negative / Urobili: 0.2 E.U./dL   Blood: x / Protein: 100 mg/dL / Nitrite: NEGATIVE   Leuk Esterase: NEGATIVE / RBC: < 5 /HPF / WBC 5-10 /HPF   Sq Epi: x / Non Sq Epi: 0-5 /HPF / Bacteria: Present /HPF      CARDIAC MARKERS ( 30 Aug 2020 06:00 )  x     / 0.02 ng/mL / x     / x     / x      CARDIAC MARKERS ( 28 Aug 2020 14:16 )  x     / 0.03 ng/mL / 155 U/L / x     / x            RADIOLOGY & ADDITIONAL TESTS: Reviewed

## 2020-08-30 NOTE — PROGRESS NOTE ADULT - PROBLEM SELECTOR PLAN 2
Patient initially presented with tachypnea, tachycardia, febrile 100.1 rectally, and WBC > 19, CRP 11.88 likely 2/2 possible CAP vs. viral cardiomyopathy vs. pulmonary edema   - c/w Ceftriaxone 1 g for 5 days (8/29 - ) and Azithromycin 500 mg for 3 days (8/29 - ) to cover CAP  - f/u blood cx - NGTD   - Urine cx NGTD   -UA negative  - hold off on fluid resuscitation at this time due to possible fluid overload state.  -Covid negative x3. F/u Covid antibodies   -HIV negative

## 2020-08-31 DIAGNOSIS — J96.01 ACUTE RESPIRATORY FAILURE WITH HYPOXIA: ICD-10-CM

## 2020-08-31 DIAGNOSIS — D64.9 ANEMIA, UNSPECIFIED: ICD-10-CM

## 2020-08-31 DIAGNOSIS — E87.3 ALKALOSIS: ICD-10-CM

## 2020-08-31 DIAGNOSIS — I27.20 PULMONARY HYPERTENSION, UNSPECIFIED: ICD-10-CM

## 2020-08-31 DIAGNOSIS — J81.0 ACUTE PULMONARY EDEMA: ICD-10-CM

## 2020-08-31 LAB
ALBUMIN SERPL ELPH-MCNC: 3.5 G/DL — SIGNIFICANT CHANGE UP (ref 3.3–5)
ALP SERPL-CCNC: 67 U/L — SIGNIFICANT CHANGE UP (ref 40–120)
ALT FLD-CCNC: 33 U/L — SIGNIFICANT CHANGE UP (ref 10–45)
ANION GAP SERPL CALC-SCNC: 7 MMOL/L — SIGNIFICANT CHANGE UP (ref 5–17)
AST SERPL-CCNC: 19 U/L — SIGNIFICANT CHANGE UP (ref 10–40)
BILIRUB SERPL-MCNC: 0.3 MG/DL — SIGNIFICANT CHANGE UP (ref 0.2–1.2)
BUN SERPL-MCNC: 18 MG/DL — SIGNIFICANT CHANGE UP (ref 7–23)
CALCIUM SERPL-MCNC: 8.8 MG/DL — SIGNIFICANT CHANGE UP (ref 8.4–10.5)
CHLORIDE SERPL-SCNC: 97 MMOL/L — SIGNIFICANT CHANGE UP (ref 96–108)
CO2 SERPL-SCNC: 38 MMOL/L — HIGH (ref 22–31)
CREAT SERPL-MCNC: 0.94 MG/DL — SIGNIFICANT CHANGE UP (ref 0.5–1.3)
GLUCOSE SERPL-MCNC: 120 MG/DL — HIGH (ref 70–99)
HCT VFR BLD CALC: 33.6 % — LOW (ref 39–50)
HGB BLD-MCNC: 9.2 G/DL — LOW (ref 13–17)
MAGNESIUM SERPL-MCNC: 2.5 MG/DL — SIGNIFICANT CHANGE UP (ref 1.6–2.6)
MCHC RBC-ENTMCNC: 20.4 PG — LOW (ref 27–34)
MCHC RBC-ENTMCNC: 27.4 GM/DL — LOW (ref 32–36)
MCV RBC AUTO: 74.3 FL — LOW (ref 80–100)
NRBC # BLD: 0 /100 WBCS — SIGNIFICANT CHANGE UP (ref 0–0)
PHOSPHATE SERPL-MCNC: 4.2 MG/DL — SIGNIFICANT CHANGE UP (ref 2.5–4.5)
PLATELET # BLD AUTO: 370 K/UL — SIGNIFICANT CHANGE UP (ref 150–400)
POTASSIUM SERPL-MCNC: 4.4 MMOL/L — SIGNIFICANT CHANGE UP (ref 3.5–5.3)
POTASSIUM SERPL-SCNC: 4.4 MMOL/L — SIGNIFICANT CHANGE UP (ref 3.5–5.3)
PROCALCITONIN SERPL-MCNC: 0.07 NG/ML — SIGNIFICANT CHANGE UP (ref 0.02–0.1)
PROT SERPL-MCNC: 7.4 G/DL — SIGNIFICANT CHANGE UP (ref 6–8.3)
RBC # BLD: 4.52 M/UL — SIGNIFICANT CHANGE UP (ref 4.2–5.8)
RBC # FLD: 20 % — HIGH (ref 10.3–14.5)
SODIUM SERPL-SCNC: 142 MMOL/L — SIGNIFICANT CHANGE UP (ref 135–145)
WBC # BLD: 11.42 K/UL — HIGH (ref 3.8–10.5)
WBC # FLD AUTO: 11.42 K/UL — HIGH (ref 3.8–10.5)

## 2020-08-31 PROCEDURE — 99233 SBSQ HOSP IP/OBS HIGH 50: CPT | Mod: GC

## 2020-08-31 PROCEDURE — 99233 SBSQ HOSP IP/OBS HIGH 50: CPT

## 2020-08-31 PROCEDURE — 99223 1ST HOSP IP/OBS HIGH 75: CPT | Mod: GC

## 2020-08-31 PROCEDURE — 71045 X-RAY EXAM CHEST 1 VIEW: CPT | Mod: 26

## 2020-08-31 RX ORDER — SPIRONOLACTONE 25 MG/1
25 TABLET, FILM COATED ORAL DAILY
Refills: 0 | Status: DISCONTINUED | OUTPATIENT
Start: 2020-08-31 | End: 2020-08-31

## 2020-08-31 RX ORDER — FUROSEMIDE 40 MG
40 TABLET ORAL EVERY 12 HOURS
Refills: 0 | Status: DISCONTINUED | OUTPATIENT
Start: 2020-08-31 | End: 2020-09-04

## 2020-08-31 RX ORDER — ACETAZOLAMIDE 250 MG/1
250 TABLET ORAL ONCE
Refills: 0 | Status: COMPLETED | OUTPATIENT
Start: 2020-08-31 | End: 2020-08-31

## 2020-08-31 RX ORDER — METOPROLOL TARTRATE 50 MG
25 TABLET ORAL EVERY 12 HOURS
Refills: 0 | Status: DISCONTINUED | OUTPATIENT
Start: 2020-09-01 | End: 2020-09-05

## 2020-08-31 RX ORDER — METOPROLOL TARTRATE 50 MG
12.5 TABLET ORAL
Refills: 0 | Status: DISCONTINUED | OUTPATIENT
Start: 2020-08-31 | End: 2020-08-31

## 2020-08-31 RX ORDER — SPIRONOLACTONE 25 MG/1
25 TABLET, FILM COATED ORAL EVERY 24 HOURS
Refills: 0 | Status: DISCONTINUED | OUTPATIENT
Start: 2020-08-31 | End: 2020-08-31

## 2020-08-31 RX ORDER — METOPROLOL TARTRATE 50 MG
25 TABLET ORAL EVERY 24 HOURS
Refills: 0 | Status: DISCONTINUED | OUTPATIENT
Start: 2020-08-31 | End: 2020-08-31

## 2020-08-31 RX ORDER — METOPROLOL TARTRATE 50 MG
25 TABLET ORAL DAILY
Refills: 0 | Status: DISCONTINUED | OUTPATIENT
Start: 2020-08-31 | End: 2020-08-31

## 2020-08-31 RX ADMIN — CEFTRIAXONE 100 MILLIGRAM(S): 500 INJECTION, POWDER, FOR SOLUTION INTRAMUSCULAR; INTRAVENOUS at 14:03

## 2020-08-31 RX ADMIN — LISINOPRIL 2.5 MILLIGRAM(S): 2.5 TABLET ORAL at 06:04

## 2020-08-31 RX ADMIN — Medication 40 MILLIGRAM(S): at 06:03

## 2020-08-31 RX ADMIN — ENOXAPARIN SODIUM 40 MILLIGRAM(S): 100 INJECTION SUBCUTANEOUS at 06:04

## 2020-08-31 RX ADMIN — ACETAZOLAMIDE 250 MILLIGRAM(S): 250 TABLET ORAL at 13:47

## 2020-08-31 RX ADMIN — SPIRONOLACTONE 25 MILLIGRAM(S): 25 TABLET, FILM COATED ORAL at 13:48

## 2020-08-31 RX ADMIN — ENOXAPARIN SODIUM 40 MILLIGRAM(S): 100 INJECTION SUBCUTANEOUS at 17:52

## 2020-08-31 RX ADMIN — Medication 25 MILLIGRAM(S): at 16:19

## 2020-08-31 RX ADMIN — AZITHROMYCIN 255 MILLIGRAM(S): 500 TABLET, FILM COATED ORAL at 14:03

## 2020-08-31 RX ADMIN — Medication 40 MILLIGRAM(S): at 19:31

## 2020-08-31 NOTE — PROGRESS NOTE ADULT - PROBLEM SELECTOR PLAN 5
F: None  E: Replete K<4. Mg<2  N: DASH/TLC diet     DVT PPx: Lovenox  [facility]: [facility]  CODE: FULL CODE TTE showed pulmonary arterial systolic pressure 76. Likely 2/2 CHF or SHONA. Patient notable for prominent P2 on auscultation. Elevated pressures could have contributed to lower extremity edema.   - pulmonary consult team following, appreciate octaviano

## 2020-08-31 NOTE — CONSULT NOTE ADULT - PROBLEM SELECTOR RECOMMENDATION 3
STOP-BANG = 7, high risk for underlying SHONA    Recommendations:  - as discussed above, will need formal sleep study done as outpatient     We will follow closely

## 2020-08-31 NOTE — PROGRESS NOTE ADULT - SUBJECTIVE AND OBJECTIVE BOX
INTERVAL HPI/OVERNIGHT EVENTS:  As per night team, no overnight events. Patient seen and examined at bedside. Patient denies fever, chills, dizziness, weakness, HA, CP, SOB, N/V/D/C, changes in bowel movements, LE swelling. 12pt ROS otherwise negative.    VITALS  Vital Signs Last 24 Hrs  T(C): 37.2 (31 Aug 2020 13:48), Max: 37.2 (31 Aug 2020 09:32)  T(F): 99 (31 Aug 2020 13:48), Max: 99 (31 Aug 2020 09:32)  HR: 78 (31 Aug 2020 12:38) (78 - 92)  BP: 120/80 (31 Aug 2020 12:38) (113/65 - 126/58)  BP(mean): 91 (31 Aug 2020 12:38) (78 - 93)  RR: 17 (31 Aug 2020 12:38) (17 - 21)  SpO2: 97% (31 Aug 2020 12:38) (97% - 100%)    CAPILLARY BLOOD GLUCOSE          PHYSICAL EXAM  General: Obese gentleman, lying comfortably in bed on 4L NC  HEENT: NC/AT, no scleral icterus, MMM, good dentition  Neck: Supple; no JVD  Respiratory: CTA B/L, no wheezes/crackles   Cardiovascular: Regular rhythm/rate; +S1, S2 and noticeable P2 in pulmonic region; no murmurs/rubs/gallops  Gastrointestinal: obese abdomen, Soft, NTND, normoactive BSx4 quadrants, no rebound, no guarding, no suprapubic tenderness  Extremities: WWP, 1+ bilateral pitting edema from feet to anterior shins; no cyanosis, no clubbing, pulses equal  Neurological: A&Ox3, CNII-XII grossly intact, no obvious focal deficits, follows commands  Skin: Normal temperature, warm, dry    MEDICATIONS  (STANDING):  cefTRIAXone   IVPB 1000 milliGRAM(s) IV Intermittent every 24 hours  enoxaparin Injectable 40 milliGRAM(s) SubCutaneous every 12 hours  furosemide    Tablet 40 milliGRAM(s) Oral daily  lisinopril 2.5 milliGRAM(s) Oral every 24 hours  metoprolol succinate ER 25 milliGRAM(s) Oral every 24 hours  spironolactone 25 milliGRAM(s) Oral every 24 hours    MEDICATIONS  (PRN):      No Known Allergies      LABS                        9.2    11.42 )-----------( 370      ( 31 Aug 2020 05:53 )             33.6     08-31    142  |  97  |  18  ----------------------------<  120<H>  4.4   |  38<H>  |  0.94    Ca    8.8      31 Aug 2020 05:53  Phos  4.2     08-31  Mg     2.5     08-31    TPro  7.4  /  Alb  3.5  /  TBili  0.3  /  DBili  x   /  AST  19  /  ALT  33  /  AlkPhos  67  08-31        CARDIAC MARKERS ( 30 Aug 2020 06:00 )  x     / 0.02 ng/mL / x     / x     / x            RADIOLOGY & ADDITIONAL TESTS: Reviewed Hospital Course:  49 y/o Male with PMHx HTN, chronic LE edema, and cardiac murmur (since childhood) who presents from his outpatient PCP office for hypoxia and SOB. In the ED T: 100.1 Rectal HR: 101-> 95 BP: 152/95 RR: 22 Saturating 79% on RA and 96% on NRB. Labs remarkable for WBC 19.73, Hgb 10.6, Plt 431, Na 129, Cl 87, CRP 11.88,  Procalcitonin .12, Trop .03, proBNP > 2000, . VBG demonstrating CXR demonstrating bilateral pulmonary infiltrate/edema. ICU consulted for hypoxic respiratory failure and r/o COVID. Repeat COVID swabs negative x3. Patient was placed on BiPAP overnight, given lasix 40mg, and started on ceftriaxone (5 day course) and azithromycin (3 day course) for PNA. His tachypnea improved on BiPAP and was placed on high flow. Standing lasix was ordered for 40mg daily. On 7Lachman, patient diuresed and fluid restricted with goal net negative -1L per day. For cardioprotection and mortality benefit, patient started on lisinopril 2.5 mg daily, Toprol XL 25 mg daily, and spironolactone 25 mg daily. Patient received 1 dose acetazolamide for elevated serum bicarbonate levels.     INTERVAL HPI/OVERNIGHT EVENTS:  As per night team, no acute overnight events. Patient placed on BiPAP for sleep apnea and transitioned to 4L NC this morning. Patient seen and examined at bedside. Says he does not have any SOB and chest pain. Lower extremity has progressively improved over the last few days and patient able to ambulate in the room. On further ROS, patient denies fever, chills, dizziness, weakness, HA, N/V/D/C, changes in bowel movements.     VITALS  Vital Signs Last 24 Hrs  T(C): 37.2 (31 Aug 2020 13:48), Max: 37.2 (31 Aug 2020 09:32)  T(F): 99 (31 Aug 2020 13:48), Max: 99 (31 Aug 2020 09:32)  HR: 78 (31 Aug 2020 12:38) (78 - 92)  BP: 120/80 (31 Aug 2020 12:38) (113/65 - 126/58)  BP(mean): 91 (31 Aug 2020 12:38) (78 - 93)  RR: 17 (31 Aug 2020 12:38) (17 - 21)  SpO2: 97% (31 Aug 2020 12:38) (97% - 100%)    CAPILLARY BLOOD GLUCOSE          PHYSICAL EXAM  General: Obese gentleman, lying comfortably in bed on 4L NC  HEENT: NC/AT, no scleral icterus, MMM, good dentition  Neck: Supple; no JVD  Respiratory: CTA B/L, no wheezes/crackles   Cardiovascular: Regular rhythm/rate; +S1, S2 and noticeable P2 in pulmonic region; no murmurs/rubs/gallops  Gastrointestinal: obese abdomen, Soft, NTND, normoactive BSx4 quadrants, no rebound, no guarding, no suprapubic tenderness  Extremities: WWP, 1+ bilateral pitting edema from feet to anterior shins; no cyanosis, no clubbing, pulses equal  Neurological: A&Ox3, CNII-XII grossly intact, no obvious focal deficits, follows commands  Skin: Normal temperature, warm, dry    MEDICATIONS  (STANDING):  cefTRIAXone   IVPB 1000 milliGRAM(s) IV Intermittent every 24 hours  enoxaparin Injectable 40 milliGRAM(s) SubCutaneous every 12 hours  furosemide    Tablet 40 milliGRAM(s) Oral daily  lisinopril 2.5 milliGRAM(s) Oral every 24 hours  metoprolol succinate ER 25 milliGRAM(s) Oral every 24 hours  spironolactone 25 milliGRAM(s) Oral every 24 hours    MEDICATIONS  (PRN):      No Known Allergies      LABS                        9.2    11.42 )-----------( 370      ( 31 Aug 2020 05:53 )             33.6     08-31    142  |  97  |  18  ----------------------------<  120<H>  4.4   |  38<H>  |  0.94    Ca    8.8      31 Aug 2020 05:53  Phos  4.2     08-31  Mg     2.5     08-31    TPro  7.4  /  Alb  3.5  /  TBili  0.3  /  DBili  x   /  AST  19  /  ALT  33  /  AlkPhos  67  08-31        CARDIAC MARKERS ( 30 Aug 2020 06:00 )  x     / 0.02 ng/mL / x     / x     / x            RADIOLOGY & ADDITIONAL TESTS: Reviewed Hospital Course:  49 y/o Male with PMHx HTN, chronic LE edema, and cardiac murmur (since childhood) who presents from his outpatient PCP office for hypoxia and SOB. In the ED T: 100.1 Rectal HR: 101-> 95 BP: 152/95 RR: 22 Saturating 79% on RA and 96% on NRB. Labs remarkable for WBC 19.73, Hgb 10.6, Plt 431, Na 129, Cl 87, CRP 11.88,  Procalcitonin .12, Trop .03, proBNP > 2000, . VBG demonstrating CXR demonstrating bilateral pulmonary infiltrate/edema. ICU consulted for hypoxic respiratory failure and r/o COVID. Repeat COVID swabs negative x3. Patient was placed on BiPAP overnight, given lasix 40mg, and started on ceftriaxone (5 day course) and azithromycin (3 day course) for PNA. His tachypnea improved on BiPAP and was placed on high flow. Standing lasix was ordered for 40mg daily. On 7Lachman, patient diuresed and fluid restricted with goal net negative -1L per day. For cardioprotection and mortality benefit, patient started on lisinopril 2.5 mg daily, Toprol XL 25 mg daily, and spironolactone 25 mg daily. Patient received 1 dose acetazolamide for elevated serum bicarbonate levels. Per cardiology attending, recommending stop lisinopril and spirnolactone. Increased lasix to 40mg IV BID. Increase toprol to 25mg BID. After 36 hours, approximately 6pm 9/1, consider starting entresto (lisinopril last given 8/31 at 6 AM). Transfer to cardiology for further GDMT and cardiac optimization.    INTERVAL HPI/OVERNIGHT EVENTS:  As per night team, no acute overnight events. Patient placed on BiPAP for sleep apnea and transitioned to 4L NC this morning. Patient seen and examined at bedside. Says he does not have any SOB and chest pain. Lower extremity has progressively improved over the last few days and patient able to ambulate in the room. On further ROS, patient denies fever, chills, dizziness, weakness, HA, N/V/D/C, changes in bowel movements.     VITALS  Vital Signs Last 24 Hrs  T(C): 37.2 (31 Aug 2020 13:48), Max: 37.2 (31 Aug 2020 09:32)  T(F): 99 (31 Aug 2020 13:48), Max: 99 (31 Aug 2020 09:32)  HR: 78 (31 Aug 2020 12:38) (78 - 92)  BP: 120/80 (31 Aug 2020 12:38) (113/65 - 126/58)  BP(mean): 91 (31 Aug 2020 12:38) (78 - 93)  RR: 17 (31 Aug 2020 12:38) (17 - 21)  SpO2: 97% (31 Aug 2020 12:38) (97% - 100%)    CAPILLARY BLOOD GLUCOSE          PHYSICAL EXAM  General: Obese gentleman, lying comfortably in bed on 4L NC  HEENT: NC/AT, no scleral icterus, MMM, good dentition  Neck: Supple; no JVD  Respiratory: CTA B/L, no wheezes/crackles   Cardiovascular: Regular rhythm/rate; +S1, S2 and noticeable P2 in pulmonic region; no murmurs/rubs/gallops  Gastrointestinal: obese abdomen, Soft, NTND, normoactive BSx4 quadrants, no rebound, no guarding, no suprapubic tenderness  Extremities: WWP, 1+ bilateral pitting edema from feet to anterior shins; no cyanosis, no clubbing, pulses equal  Neurological: A&Ox3, CNII-XII grossly intact, no obvious focal deficits, follows commands  Skin: Normal temperature, warm, dry    MEDICATIONS  (STANDING):  cefTRIAXone   IVPB 1000 milliGRAM(s) IV Intermittent every 24 hours  enoxaparin Injectable 40 milliGRAM(s) SubCutaneous every 12 hours  furosemide    Tablet 40 milliGRAM(s) Oral daily  lisinopril 2.5 milliGRAM(s) Oral every 24 hours  metoprolol succinate ER 25 milliGRAM(s) Oral every 24 hours  spironolactone 25 milliGRAM(s) Oral every 24 hours    MEDICATIONS  (PRN):      No Known Allergies      LABS                        9.2    11.42 )-----------( 370      ( 31 Aug 2020 05:53 )             33.6     08-31    142  |  97  |  18  ----------------------------<  120<H>  4.4   |  38<H>  |  0.94    Ca    8.8      31 Aug 2020 05:53  Phos  4.2     08-31  Mg     2.5     08-31    TPro  7.4  /  Alb  3.5  /  TBili  0.3  /  DBili  x   /  AST  19  /  ALT  33  /  AlkPhos  67  08-31        CARDIAC MARKERS ( 30 Aug 2020 06:00 )  x     / 0.02 ng/mL / x     / x     / x            RADIOLOGY & ADDITIONAL TESTS: Reviewed

## 2020-08-31 NOTE — CONSULT NOTE ADULT - PROBLEM SELECTOR RECOMMENDATION 9
Found on TTE to have estimated PASP 76mmHg in the setting of decompensated systolic CHF and right heart failure, all of which are new diagnoses for him. 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 Will need additional workup to r/o other etiologies.    Recommendations:  - will need a formal sleep study/PSG performed as an outpatient to eval for SHONA  - if patient undergoes cardiac ischemic workup while hospitalized, specifically left heart cath, would request right heart cath w/ vasodilator testing be performed at that time to assess for PAH

## 2020-08-31 NOTE — PROGRESS NOTE ADULT - PROBLEM SELECTOR PLAN 3
CXR showed bilateral pulmonary edema, exam notable for b/l LE edema, elevated pro-BNP and POCUS demonstrating B-lines. Pulmonary edema possibly 2/2 to decompensated CHF due to underlying infectious etiology. Patient with improving LE swelling and no crackles on exam  - plan as above  - monitor I/Os, monitor UOP carefully. Goal net negative 1 L. Patient net neg -950 cc last 24h CXR showed bilateral pulmonary edema, exam notable for b/l LE edema, elevated pro-BNP and POCUS demonstrating B-lines. Pulmonary edema possibly 2/2 to decompensated systolic CHF due to underlying infectious etiology. Patient with improving LE swelling and no crackles on exam  - plan as above  - monitor I/Os, monitor UOP carefully. Goal net negative 1 L. Patient net neg -950 cc last 24h

## 2020-08-31 NOTE — PROGRESS NOTE ADULT - SUBJECTIVE AND OBJECTIVE BOX
PAST MEDICAL & SURGICAL HISTORY:  HTN (hypertension)  No significant past surgical history      Home Medications:  valsartan-hydrochlorothiazide 320mg-25mg oral tablet: 1 tab(s) orally once a day (28 Aug 2020 20:35)      MEDICATIONS  (STANDING):  acetaZOLAMIDE    Tablet 250 milliGRAM(s) Oral once  azithromycin  IVPB 500 milliGRAM(s) IV Intermittent every 24 hours  cefTRIAXone   IVPB 1000 milliGRAM(s) IV Intermittent every 24 hours  enoxaparin Injectable 40 milliGRAM(s) SubCutaneous every 12 hours  furosemide    Tablet 40 milliGRAM(s) Oral daily  lisinopril 2.5 milliGRAM(s) Oral every 24 hours  metoprolol succinate ER 25 milliGRAM(s) Oral daily  spironolactone 25 milliGRAM(s) Oral daily    MEDICATIONS  (PRN):    .  VITAL SIGNS:  T(C): 37.2 (08-31-20 @ 09:32), Max: 37.2 (08-31-20 @ 09:32)  T(F): 99 (08-31-20 @ 09:32), Max: 99 (08-31-20 @ 09:32)  HR: 86 (08-31-20 @ 09:01) (78 - 92)  BP: 122/62 (08-31-20 @ 09:01) (113/65 - 126/58)  BP(mean): 85 (08-31-20 @ 09:01) (78 - 93)  RR: 18 (08-31-20 @ 09:01) (18 - 21)  SpO2: 97% (08-31-20 @ 09:01) (97% - 100%)  Wt(kg): --    PHYSICAL EXAM: INCOMPLETE    Constitutional: WDWN resting comfortably in bed; NAD  Head: NC/AT  Eyes: PERRL, EOMI, anicteric sclera  ENT: no nasal discharge; uvula midline, no oropharyngeal erythema or exudates; MMM  Neck: supple; no JVD or thyromegaly  Respiratory: CTA B/L; no W/R/R, no retractions  Cardiac: +S1/S2; RRR; no M/R/G; PMI non-displaced  Gastrointestinal: soft, NT/ND; no rebound or guarding; +BSx4  Genitourinary: normal external genitalia  Back: spine midline, no bony tenderness or step-offs; no CVAT B/L  Extremities: WWP, no clubbing or cyanosis; no peripheral edema  Musculoskeletal: NROM x4; no joint swelling, tenderness or erythema  Vascular: 2+ radial, femoral, DP/PT pulses B/L  Dermatologic: skin warm, dry and intact; no rashes, wounds, or scars  Lymphatic: no submandibular or cervical LAD  Neurologic: AAOx3; CNII-XII grossly intact; no focal deficits  Psychiatric: affect and characteristics of appearance, verbalizations, behaviors are appropriate    .  LABS:                         9.2    11.42 )-----------( 370      ( 31 Aug 2020 05:53 )             33.6     08-31    142  |  97  |  18  ----------------------------<  120<H>  4.4   |  38<H>  |  0.94    Ca    8.8      31 Aug 2020 05:53  Phos  4.2     08-31  Mg     2.5     08-31    TPro  7.4  /  Alb  3.5  /  TBili  0.3  /  DBili  x   /  AST  19  /  ALT  33  /  AlkPhos  67  08-31        CARDIAC MARKERS ( 30 Aug 2020 06:00 )  x     / 0.02 ng/mL / x     / x     / x                RADIOLOGY, EKG & ADDITIONAL TESTS: Reviewed.     < from: CT Angio Chest PE Protocol w/ IV Cont (08.28.20 @ 19:43) >  IMPRESSION:  No pulmonary embolism.    Pulmonary edema or ARDS.        < end of copied text >    < from: TTE Limited Echo w/o Cont (08.28.20 @ 16:06) >  CONCLUSIONS:     1. Limited study obtained for evaluation of valvular function.   2. Technically difficult study.   3. No significant valvular disease.   4. Patient tachycardic during the study.   5. There is trace tricuspid regurgitation. Pulmonary artery systolic pressure (estimated using the tricuspid regurgitant gradient and an estimate of right atrial pressure) is 76 mmHg.   6. Right ventricular size cannot be determined with accuracy. The right ventricle is probably dilated. Right ventricular systolic function is reduced. The tricuspid annular plane systolic excursion (TAPSE) is 14.00 mm (normal >=17 mm). RV tissue Doppler S' is 16.00 cm/s (normal >10 cm/s).   7. Left ventricular endocardium is not well visualized. Grossly, left ventricular function appearsmoderate to severely reduced with a calculated ejection fraction of 30-35% with global hypokinesis.   8. Trivial pericardial effusion.      < end of copied text > Interval HPI: Patient off bipap now on 2 L NC, feeling much improved. Reports good diuresis post lasix, has not been out of bed to chair yet. ROS negative for CP, palpitations.      PAST MEDICAL & SURGICAL HISTORY:  HTN (hypertension)  No significant past surgical history      Home Medications:  valsartan-hydrochlorothiazide 320mg-25mg oral tablet: 1 tab(s) orally once a day (28 Aug 2020 20:35)      MEDICATIONS  (STANDING):  acetaZOLAMIDE    Tablet 250 milliGRAM(s) Oral once  azithromycin  IVPB 500 milliGRAM(s) IV Intermittent every 24 hours  cefTRIAXone   IVPB 1000 milliGRAM(s) IV Intermittent every 24 hours  enoxaparin Injectable 40 milliGRAM(s) SubCutaneous every 12 hours  furosemide    Tablet 40 milliGRAM(s) Oral daily  lisinopril 2.5 milliGRAM(s) Oral every 24 hours  metoprolol succinate ER 25 milliGRAM(s) Oral daily  spironolactone 25 milliGRAM(s) Oral daily    MEDICATIONS  (PRN):    .  VITAL SIGNS:  T(C): 37.2 (08-31-20 @ 09:32), Max: 37.2 (08-31-20 @ 09:32)  T(F): 99 (08-31-20 @ 09:32), Max: 99 (08-31-20 @ 09:32)  HR: 86 (08-31-20 @ 09:01) (78 - 92)  BP: 122/62 (08-31-20 @ 09:01) (113/65 - 126/58)  BP(mean): 85 (08-31-20 @ 09:01) (78 - 93)  RR: 18 (08-31-20 @ 09:01) (18 - 21)  SpO2: 97% (08-31-20 @ 09:01) (97% - 100%)  Wt(kg): --    PHYSICAL EXAM:     Constitutional: WDWN resting comfortably in bed; NAD  Head: NC/AT  Neck: supple; JVD+  Respiratory: Bibasilar crackles with Mildly diminished BS R>L  Cardiac: +S1/S2; RRR; no M/R/G;   Gastrointestinal: soft, NT/ND; no rebound or guarding; +BS  Extremities: WWP, 2+ peripheral pitting edema  Vascular: 2+ radial,  DP/PT pulses B/L  Neurologic: AAOx3; CNII-XII grossly intact; no focal deficits      .  LABS:                         9.2    11.42 )-----------( 370      ( 31 Aug 2020 05:53 )             33.6     08-31    142  |  97  |  18  ----------------------------<  120<H>  4.4   |  38<H>  |  0.94    Ca    8.8      31 Aug 2020 05:53  Phos  4.2     08-31  Mg     2.5     08-31    TPro  7.4  /  Alb  3.5  /  TBili  0.3  /  DBili  x   /  AST  19  /  ALT  33  /  AlkPhos  67  08-31        CARDIAC MARKERS ( 30 Aug 2020 06:00 )  x     / 0.02 ng/mL / x     / x     / x                RADIOLOGY, EKG & ADDITIONAL TESTS: Reviewed.     < from: CT Angio Chest PE Protocol w/ IV Cont (08.28.20 @ 19:43) >  IMPRESSION:  No pulmonary embolism.    Pulmonary edema or ARDS.        < end of copied text >    < from: TTE Limited Echo w/o Cont (08.28.20 @ 16:06) >  CONCLUSIONS:     1. Limited study obtained for evaluation of valvular function.   2. Technically difficult study.   3. No significant valvular disease.   4. Patient tachycardic during the study.   5. There is trace tricuspid regurgitation. Pulmonary artery systolic pressure (estimated using the tricuspid regurgitant gradient and an estimate of right atrial pressure) is 76 mmHg.   6. Right ventricular size cannot be determined with accuracy. The right ventricle is probably dilated. Right ventricular systolic function is reduced. The tricuspid annular plane systolic excursion (TAPSE) is 14.00 mm (normal >=17 mm). RV tissue Doppler S' is 16.00 cm/s (normal >10 cm/s).   7. Left ventricular endocardium is not well visualized. Grossly, left ventricular function appearsmoderate to severely reduced with a calculated ejection fraction of 30-35% with global hypokinesis.   8. Trivial pericardial effusion.      < end of copied text >

## 2020-08-31 NOTE — CONSULT NOTE ADULT - PROBLEM SELECTOR RECOMMENDATION 2
Likely secondary to decompensated heart failure and is improving with diuresis. Continue current management and diuresis per cards

## 2020-08-31 NOTE — CONSULT NOTE ADULT - SUBJECTIVE AND OBJECTIVE BOX
PULMONARY SERVICE INITIAL CONSULT NOTE    HPI:  49 y/o M with hx of HTN and chronic LE edema , and cardiac murmur (since childhood), that presents from his outpatient PCP office for hypoxia and SOB. Pt states for the past 1-2 weeks he has been experiencing anxiety which has resulted in insomnia. He reached out this sister/cousin who is a nurse who recommended to take Benadryl to help with sleep. Pt ingested benadryl on 8/26 and states the following day developed SOB , increased anxiety, and trouble breathing. Pt denies any tobacco, vape, or drug use. States he has no pets at home. Has been quarantining during the pandemic and has no sick contacts. 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 Of note pt states he tried CBD oil 2 weeks ago which was a droplet on tongue but hasnt taken it in 1 week and takes supplements of yisel, turmeric, and moringa. Otherwise Denies any CP, abdominal pain,  N/V, Diarrhea, F, or chills.     REVIEW OF SYSTEMS:  Constitutional: No fever, weight loss or fatigue  Eyes: No eye pain, visual disturbances, or discharge  ENMT:  No difficulty hearing, tinnitus, vertigo; No sinus or throat pain  Neck: No pain, stiffness or neck swelling  Respiratory: see HPI  Cardiovascular: No chest pain, palpitations, dizziness or leg swelling  Gastrointestinal: No abdominal or epigastric pain. No nausea, vomiting or hematemesis; No diarrhea or constipation. No melena or hematochezia.  Genitourinary: No dysuria, frequency, hematuria or incontinence  Neurological: No headaches, memory loss, loss of strength, numbness or tremors  Skin: No itching, burning, rashes or lesions   Lymph Nodes: No enlarged glands  Endocrine: No heat or cold intolerance; No hair loss  Musculoskeletal: No joint pain or swelling; No muscle, back or extremity pain  Psychiatric: No depression, anxiety, mood swings or difficulty sleeping  Heme/Lymph: No easy bruising or bleeding gums  Allergy and Immunologic: No hives or eczema    PAST MEDICAL & SURGICAL HISTORY:  HTN (hypertension)  No significant past surgical history    FAMILY HISTORY:  +FHx CAD in mother    SOCIAL HISTORY:  Smoking Status: [ ] Current, [ ] Former, [X] Never  Pack Years:    MEDICATIONS:  Pulmonary:    Antimicrobials:  cefTRIAXone   IVPB 1000 milliGRAM(s) IV Intermittent every 24 hours    Anticoagulants:  enoxaparin Injectable 40 milliGRAM(s) SubCutaneous every 12 hours    Onc:    GI/:    Endocrine:    Cardiac:  furosemide    Tablet 40 milliGRAM(s) Oral daily  spironolactone 25 milliGRAM(s) Oral every 24 hours    Other Medications:    Allergies    No Known Allergies    Intolerances    Vital Signs Last 24 Hrs  T(C): 37.2 (31 Aug 2020 13:48), Max: 37.2 (31 Aug 2020 09:32)  T(F): 99 (31 Aug 2020 13:48), Max: 99 (31 Aug 2020 09:32)  HR: 82 (31 Aug 2020 16:26) (78 - 92)  BP: 117/54 (31 Aug 2020 16:26) (115/58 - 126/58)  BP(mean): 78 (31 Aug 2020 16:26) (78 - 93)  RR: 22 (31 Aug 2020 16:26) (17 - 22)  SpO2: 100% (31 Aug 2020 16:26) (97% - 100%)    08-30 @ 07:01 - 08-31 @ 07:00  --------------------------------------------------------  IN: 750 mL / OUT: 1700 mL / NET: -950 mL    08-31 @ 07:01 - 08-31 @ 17:39  --------------------------------------------------------  IN: 0 mL / OUT: 875 mL / NET: -875 mL    PHYSICAL EXAM:  Constitutional: obese man resting comfortably in bed; NAD  Head: NC/AT  EENT: PERRL, anicteric sclera; oropharynx clear, MMM  Neck: supple, no appreciable JVD  Respiratory: diminished bibasilar BS  Cardiovascular: +S1/S2, RRR  Gastrointestinal: obese and soft, NT/ND  Extremities: WWP; no edema, clubbing or cyanosis  Vascular: 2+ radial pulses B/L  Neurological: awake and alert; HANNAH  Psychiatric: appropriate mood, normal affect    LABS:  CBC Full  -  ( 31 Aug 2020 05:53 )  WBC Count : 11.42 K/uL  RBC Count : 4.52 M/uL  Hemoglobin : 9.2 g/dL  Hematocrit : 33.6 %  Platelet Count - Automated : 370 K/uL  Mean Cell Volume : 74.3 fl  Mean Cell Hemoglobin : 20.4 pg  Mean Cell Hemoglobin Concentration : 27.4 gm/dL  Auto Neutrophil # : x  Auto Lymphocyte # : x  Auto Monocyte # : x  Auto Eosinophil # : x  Auto Basophil # : x  Auto Neutrophil % : x  Auto Lymphocyte % : x  Auto Monocyte % : x  Auto Eosinophil % : x  Auto Basophil % : x    08-31    142  |  97  |  18  ----------------------------<  120<H>  4.4   |  38<H>  |  0.94    Ca    8.8      31 Aug 2020 05:53  Phos  4.2     08-31  Mg     2.5     08-31    TPro  7.4  /  Alb  3.5  /  TBili  0.3  /  DBili  x   /  AST  19  /  ALT  33  /  AlkPhos  67  08-31    RADIOLOGY & ADDITIONAL STUDIES:  Personally reviewed CXR and compared w/ prior, 8/31: bilateral reticular opacities

## 2020-08-31 NOTE — PROGRESS NOTE ADULT - ASSESSMENT
49 yo Obese Male with  PMH of systemic HTN admitted with SOB, and Hypoxia found to be in Acute Hypoxemic respiratory failure from acute decompensated systolic CHF with Newly reduced LV function.    1) Acute Decompensated Heart Failure, now clinically Improved  -Patient admitted with Hypoxic Respiratory failure, clinically overloaded, diuresed and treated for possible underlying CAP with improvement in symptoms.   -Tele and EKG Reviewed. Patient in Sinus with RAD, frequent PVCS and small run of NSVT on tele (8 beats)  -Echo with Evidence of Reduced LV function 30-35% with global hypokinesis and Elevated Right sided pressures.  -Recommend repeating echo with contrast enhancing agent for EF given poor quality images in setting of large body habitus  -Pt with likely righ heart failure from SHONA versus obesity hypoventilation syndrome. He would benefit from outpatient sleep study. Pulm following. Appreciate recommendations  -At this time recommend continued diuresis for improvement in Symptoms and PA pressures. WOuld dose 40 IV lasix additional and start on Lasix 40 mg IV BID with close I/O's monitoring. Would discontinue spironolactone for now  -Would discontinued Lisinopril (given today) and start patient on low dose Entresto 24/26 mg in 36 hours.  -As patient well compensated with evidence of ectopy on tele, can continue with Sustained BB therapy: Cont with Toprol 25 mg PO BID.  -Once euvolemic patient would benefit from Ischemic workup  -In the interim would send TSH, Lipid Profile, A1c, HIV test with am labs to eval for non ischemic cause of LV dysfunction  -Pt would benefit from Cardiac Optimization. Please transfer to Cardiology under Dr Rivera  -In the interim, please keep K>4 and Mg>2.

## 2020-08-31 NOTE — PROGRESS NOTE ADULT - PROBLEM SELECTOR PLAN 8
F: None  E: Replete K<4. Mg<2  N: DASH/TLC diet     DVT PPx: Lovenox  [facility]  CODE: FULL CODE

## 2020-08-31 NOTE — CONSULT NOTE ADULT - ASSESSMENT
51yo man and never smoker w/ PMH systemic HTN admitted for acute hypoxemic respiratory failure in the setting of acute decompensated systolic CHF including right heart failure and severe pulmonary HTN. Pulmonary consulted for management and workup recommendations for pulmonary HTN.

## 2020-08-31 NOTE — PROGRESS NOTE ADULT - PROBLEM SELECTOR PLAN 1
Patient presented initially with tachypnea and hypoxia, now with improved tachypnea and O2 saturation. Weaned from HFNC to nasal canula. Patient no longer tachypneic and vitals have been stable. Likely etiology is underlying heart failure as TTE revealed PASP 76 and LV EF 30-35% with global hypokinesis.  - CXR 8/31: b/l opacities that are resolving since admission  - CT Angio 8/28: suggestive of pulmonary edema  - weaned to 4L NC, satting 97%  - Place on BiPAP overnight  - c/w Lasix 40mg PO daily  - c/w lisinopril 2.5 mg daily, monitor K+ levels  - added spironolactone 25 mg daily and toprol XL 25 mg daily as both serve cardioprotection and decrease mortality Patient presented initially with tachypnea and hypoxia, now with improved tachypnea and O2 saturation. Weaned from HFNC to nasal canula. Patient no longer tachypneic and vitals have been stable. Likely etiology is underlying heart failure as TTE revealed PASP 76 and LV EF 30-35% with global hypokinesis.  - CXR 8/31: b/l opacities that are resolving since admission  - CT Angio 8/28: suggestive of pulmonary edema  - weaned to 4L NC, satting 97%  - Place on BiPAP overnight  - c/w Lasix 40mg PO daily  - c/w lisinopril 2.5 mg daily, monitor K+ levels  - added spironolactone 25 mg daily and toprol XL 25 mg daily as both serve cardioprotection and decrease mortality  - pulmonary consult service following, appreciate octaviano

## 2020-08-31 NOTE — PROGRESS NOTE ADULT - PROBLEM SELECTOR PLAN 6
Patient found to have low Hgb 10.0 on admission without any signs of bleeding.   - AM 8/31 Hgb 9.2  - Continue to monitor  - f/u iron panel

## 2020-08-31 NOTE — PROGRESS NOTE ADULT - ASSESSMENT
49 y/o Male with PMHx HTN, cardiac murmur (since childhood), and chronic LE edema who presents from his outpatient PCP office for hypoxia and SOB. Found to have hypoxic respiratory failure with pulmonary edema, biventricular failure and possibly CAP, currently improving.

## 2020-08-31 NOTE — PROGRESS NOTE ADULT - ATTENDING COMMENTS
acute right heart failure  with acute LV systolic HF  start lasix 40 iv bid  start entresto 36 hours after last dose of lisinopril  likely has righ heart failure from SHONA versus obesity hypoventilation syndrome needs outpatient sleep study  repeat echo with contrast enhancing agent for EF poor quality images  transfer to 5 ur[facility] for cardiac optimization

## 2020-08-31 NOTE — PROGRESS NOTE ADULT - PROBLEM SELECTOR PLAN 2
Patient initially presented with tachypnea, tachycardia, febrile 100.1 rectally, and WBC > 19, CRP 11.88 likely 2/2 possible CAP vs pulmonary edema   - Afebrile overnight, WBC continues to downtrend (11.42 today)  - c/w Ceftriaxone 1 g for 5 days (8/29 - ) and Azithromycin 500 mg for 3 days (8/29 -8/31) to cover CAP  - Repeat procal 8/31: 0.07 (downtrending)  - f/u blood cx - NGTD   - Urine cx NGTD   - UA negative  - Covid negative x3. F/u Covid antibodies   - hold off on fluid resuscitation at this time due to possible fluid overload state  - HIV negative

## 2020-08-31 NOTE — PROGRESS NOTE ADULT - PROBLEM SELECTOR PLAN 4
Patient with history of HTN, at home takes valsartan hctz 320-25 combo  -c/w Lisinopril 2.5 mg daily  -c/w Lasix 40mg PO daily

## 2020-08-31 NOTE — PROGRESS NOTE ADULT - PROBLEM SELECTOR PLAN 7
Patient found to have slowly uptrending serum bicarbonate levels. Could be secondary to chronic CO2 retention. Vital signs stable and patient without any respiratory complaints at this time.   - Given one dose acetazolamide 250 mg PO   - Monitor AM labs

## 2020-09-01 DIAGNOSIS — J18.9 PNEUMONIA, UNSPECIFIED ORGANISM: ICD-10-CM

## 2020-09-01 DIAGNOSIS — I50.21 ACUTE SYSTOLIC (CONGESTIVE) HEART FAILURE: ICD-10-CM

## 2020-09-01 LAB
A1C WITH ESTIMATED AVERAGE GLUCOSE RESULT: 6.5 % — HIGH (ref 4–5.6)
ALBUMIN SERPL ELPH-MCNC: 3.3 G/DL — SIGNIFICANT CHANGE UP (ref 3.3–5)
ALP SERPL-CCNC: 63 U/L — SIGNIFICANT CHANGE UP (ref 40–120)
ALT FLD-CCNC: 35 U/L — SIGNIFICANT CHANGE UP (ref 10–45)
ANION GAP SERPL CALC-SCNC: 5 MMOL/L — SIGNIFICANT CHANGE UP (ref 5–17)
AST SERPL-CCNC: 18 U/L — SIGNIFICANT CHANGE UP (ref 10–40)
BASE EXCESS BLDA CALC-SCNC: 10.2 MMOL/L — HIGH (ref -2–3)
BILIRUB SERPL-MCNC: 0.2 MG/DL — SIGNIFICANT CHANGE UP (ref 0.2–1.2)
BUN SERPL-MCNC: 20 MG/DL — SIGNIFICANT CHANGE UP (ref 7–23)
CALCIUM SERPL-MCNC: 9.1 MG/DL — SIGNIFICANT CHANGE UP (ref 8.4–10.5)
CHLORIDE SERPL-SCNC: 96 MMOL/L — SIGNIFICANT CHANGE UP (ref 96–108)
CHOLEST SERPL-MCNC: 158 MG/DL — SIGNIFICANT CHANGE UP (ref 10–199)
CO2 SERPL-SCNC: 40 MMOL/L — HIGH (ref 22–31)
CREAT SERPL-MCNC: 1.07 MG/DL — SIGNIFICANT CHANGE UP (ref 0.5–1.3)
ESTIMATED AVERAGE GLUCOSE: 140 MG/DL — HIGH (ref 68–114)
FERRITIN SERPL-MCNC: 96 NG/ML — SIGNIFICANT CHANGE UP (ref 30–400)
GLUCOSE BLDC GLUCOMTR-MCNC: 115 MG/DL — HIGH (ref 70–99)
GLUCOSE BLDC GLUCOMTR-MCNC: 116 MG/DL — HIGH (ref 70–99)
GLUCOSE BLDC GLUCOMTR-MCNC: 143 MG/DL — HIGH (ref 70–99)
GLUCOSE SERPL-MCNC: 121 MG/DL — HIGH (ref 70–99)
HCO3 BLDA-SCNC: 37 MMOL/L — HIGH (ref 21–28)
HCT VFR BLD CALC: 32.2 % — LOW (ref 39–50)
HDLC SERPL-MCNC: 40 MG/DL — SIGNIFICANT CHANGE UP
HGB BLD-MCNC: 8.8 G/DL — LOW (ref 13–17)
IRON SATN MFR SERPL: 24 UG/DL — LOW (ref 45–165)
IRON SATN MFR SERPL: 8 % — LOW (ref 16–55)
LIPID PNL WITH DIRECT LDL SERPL: 107 MG/DL — HIGH
MAGNESIUM SERPL-MCNC: 2.5 MG/DL — SIGNIFICANT CHANGE UP (ref 1.6–2.6)
MCHC RBC-ENTMCNC: 20.1 PG — LOW (ref 27–34)
MCHC RBC-ENTMCNC: 27.3 GM/DL — LOW (ref 32–36)
MCV RBC AUTO: 73.5 FL — LOW (ref 80–100)
NRBC # BLD: 0 /100 WBCS — SIGNIFICANT CHANGE UP (ref 0–0)
PCO2 BLDA: 62 MMHG — CRITICAL HIGH (ref 35–48)
PH BLDA: 7.4 — SIGNIFICANT CHANGE UP (ref 7.35–7.45)
PHOSPHATE SERPL-MCNC: 3.8 MG/DL — SIGNIFICANT CHANGE UP (ref 2.5–4.5)
PLATELET # BLD AUTO: 372 K/UL — SIGNIFICANT CHANGE UP (ref 150–400)
PO2 BLDA: 61 MMHG — LOW (ref 83–108)
POTASSIUM SERPL-MCNC: 4.2 MMOL/L — SIGNIFICANT CHANGE UP (ref 3.5–5.3)
POTASSIUM SERPL-SCNC: 4.2 MMOL/L — SIGNIFICANT CHANGE UP (ref 3.5–5.3)
PROT SERPL-MCNC: 7 G/DL — SIGNIFICANT CHANGE UP (ref 6–8.3)
RBC # BLD: 4.38 M/UL — SIGNIFICANT CHANGE UP (ref 4.2–5.8)
RBC # FLD: 19.9 % — HIGH (ref 10.3–14.5)
SAO2 % BLDA: 88 % — LOW (ref 95–100)
SODIUM SERPL-SCNC: 141 MMOL/L — SIGNIFICANT CHANGE UP (ref 135–145)
TIBC SERPL-MCNC: 296 UG/DL — SIGNIFICANT CHANGE UP (ref 220–430)
TOTAL CHOLESTEROL/HDL RATIO MEASUREMENT: 4 RATIO — SIGNIFICANT CHANGE UP (ref 3.4–9.6)
TRIGL SERPL-MCNC: 57 MG/DL — SIGNIFICANT CHANGE UP (ref 10–149)
TSH SERPL-MCNC: 0.5 UIU/ML — SIGNIFICANT CHANGE UP (ref 0.35–4.94)
UIBC SERPL-MCNC: 272 UG/DL — SIGNIFICANT CHANGE UP (ref 110–370)
WBC # BLD: 12.37 K/UL — HIGH (ref 3.8–10.5)
WBC # FLD AUTO: 12.37 K/UL — HIGH (ref 3.8–10.5)

## 2020-09-01 PROCEDURE — 99233 SBSQ HOSP IP/OBS HIGH 50: CPT | Mod: GC

## 2020-09-01 PROCEDURE — 99233 SBSQ HOSP IP/OBS HIGH 50: CPT

## 2020-09-01 RX ORDER — DEXTROSE 50 % IN WATER 50 %
12.5 SYRINGE (ML) INTRAVENOUS ONCE
Refills: 0 | Status: DISCONTINUED | OUTPATIENT
Start: 2020-09-01 | End: 2020-09-05

## 2020-09-01 RX ORDER — GLUCAGON INJECTION, SOLUTION 0.5 MG/.1ML
1 INJECTION, SOLUTION SUBCUTANEOUS ONCE
Refills: 0 | Status: DISCONTINUED | OUTPATIENT
Start: 2020-09-01 | End: 2020-09-05

## 2020-09-01 RX ORDER — SODIUM CHLORIDE 9 MG/ML
1000 INJECTION, SOLUTION INTRAVENOUS
Refills: 0 | Status: DISCONTINUED | OUTPATIENT
Start: 2020-09-01 | End: 2020-09-05

## 2020-09-01 RX ORDER — SACUBITRIL AND VALSARTAN 24; 26 MG/1; MG/1
1 TABLET, FILM COATED ORAL EVERY 12 HOURS
Refills: 0 | Status: DISCONTINUED | OUTPATIENT
Start: 2020-09-01 | End: 2020-09-05

## 2020-09-01 RX ORDER — DEXTROSE 50 % IN WATER 50 %
15 SYRINGE (ML) INTRAVENOUS ONCE
Refills: 0 | Status: DISCONTINUED | OUTPATIENT
Start: 2020-09-01 | End: 2020-09-05

## 2020-09-01 RX ORDER — SACUBITRIL AND VALSARTAN 24; 26 MG/1; MG/1
1 TABLET, FILM COATED ORAL
Qty: 60 | Refills: 0
Start: 2020-09-01 | End: 2020-09-30

## 2020-09-01 RX ORDER — DEXTROSE 50 % IN WATER 50 %
25 SYRINGE (ML) INTRAVENOUS ONCE
Refills: 0 | Status: DISCONTINUED | OUTPATIENT
Start: 2020-09-01 | End: 2020-09-05

## 2020-09-01 RX ORDER — INSULIN LISPRO 100/ML
VIAL (ML) SUBCUTANEOUS
Refills: 0 | Status: DISCONTINUED | OUTPATIENT
Start: 2020-09-01 | End: 2020-09-05

## 2020-09-01 RX ORDER — IRON SUCROSE 20 MG/ML
200 INJECTION, SOLUTION INTRAVENOUS
Refills: 0 | Status: COMPLETED | OUTPATIENT
Start: 2020-09-01 | End: 2020-09-05

## 2020-09-01 RX ADMIN — Medication 25 MILLIGRAM(S): at 21:26

## 2020-09-01 RX ADMIN — SACUBITRIL AND VALSARTAN 1 TABLET(S): 24; 26 TABLET, FILM COATED ORAL at 18:50

## 2020-09-01 RX ADMIN — Medication 25 MILLIGRAM(S): at 10:22

## 2020-09-01 RX ADMIN — Medication 40 MILLIGRAM(S): at 06:28

## 2020-09-01 RX ADMIN — ENOXAPARIN SODIUM 40 MILLIGRAM(S): 100 INJECTION SUBCUTANEOUS at 17:30

## 2020-09-01 RX ADMIN — ENOXAPARIN SODIUM 40 MILLIGRAM(S): 100 INJECTION SUBCUTANEOUS at 06:27

## 2020-09-01 RX ADMIN — Medication 40 MILLIGRAM(S): at 17:31

## 2020-09-01 RX ADMIN — IRON SUCROSE 110 MILLIGRAM(S): 20 INJECTION, SOLUTION INTRAVENOUS at 13:46

## 2020-09-01 RX ADMIN — CEFTRIAXONE 100 MILLIGRAM(S): 500 INJECTION, POWDER, FOR SOLUTION INTRAMUSCULAR; INTRAVENOUS at 15:25

## 2020-09-01 NOTE — PROGRESS NOTE ADULT - PROBLEM SELECTOR PLAN 2
Patient initially presented with tachypnea, tachycardia, febrile 100.1 rectally, and WBC > 19, CRP 11.88 likely 2/2 possible CAP vs pulmonary edema   - Afebrile overnight, WBC continues to downtrend (11.42 today)  - c/w Ceftriaxone 1 g for 5 days (8/29 - ) and Azithromycin 500 mg for 3 days (8/29 -8/31) to cover CAP  - Repeat procal 8/31: 0.07 (downtrending)  - f/u blood cx - NGTD   - Urine cx NGTD   - UA negative  - Covid negative x3. F/u Covid antibodies   - hold off on fluid resuscitation at this time due to possible fluid overload state  - HIV negative initially tachypnea, tachycardia, febrile 100.1 rectally, and WBC > 19,   - Afebrile WBC continues to downtrend   - CONTINUE Ceftriaxone 1 g for 5 days (8/29 -9/2 )   - COMPLETED Azithromycin 500 mg for 3 days (8/29 -8/31) to cover CAP  - Repeat procal 8/31: 0.07 (downtrending)  - f/u blood cx - NGTD   - Urine cx NGTD   - UA negative  - Covid negative x3. F/u Covid antibodies   - HIV negative

## 2020-09-01 NOTE — PROGRESS NOTE ADULT - SUBJECTIVE AND OBJECTIVE BOX
PULMONARY CONSULT SERVICE FOLLOW-UP NOTE    INTERVAL HPI:  Reviewed chart and overnight events; patient seen and examined at bedside.  - Transferred to cardiac tele yesterday PM for ischemic workup  - Feels well today, no respiratory complaints, feels he is improving.    MEDICATIONS:  Pulmonary:    Antimicrobials:  cefTRIAXone   IVPB 1000 milliGRAM(s) IV Intermittent every 24 hours    Anticoagulants:  enoxaparin Injectable 40 milliGRAM(s) SubCutaneous every 12 hours    Cardiac:  furosemide   Injectable 40 milliGRAM(s) IV Push every 12 hours  metoprolol succinate ER 25 milliGRAM(s) Oral every 12 hours  sacubitril 24 mG/valsartan 26 mG 1 Tablet(s) Oral every 12 hours    Allergies    No Known Allergies    Intolerances    Vital Signs Last 24 Hrs  T(C): 36.2 (01 Sep 2020 13:02), Max: 37.4 (31 Aug 2020 21:51)  T(F): 97.1 (01 Sep 2020 13:02), Max: 99.3 (31 Aug 2020 21:51)  HR: 79 (01 Sep 2020 13:51) (74 - 87)  BP: 107/59 (01 Sep 2020 13:51) (105/58 - 131/63)  BP(mean): 75 (01 Sep 2020 00:28) (75 - 83)  RR: 99 (01 Sep 2020 13:51) (18 - 99)  SpO2: 98% (01 Sep 2020 13:00) (94% - 100%)    08-31 @ 07:01 - 09-01 @ 07:00  --------------------------------------------------------  IN: 0 mL / OUT: 1475 mL / NET: -1475 mL    09-01 @ 07:01 - 09-01 @ 14:46  --------------------------------------------------------  IN: 180 mL / OUT: 975 mL / NET: -795 mL    PHYSICAL EXAM:  Constitutional: obese man resting comfortably in bed; NAD  HEENT: NC/AT; PERRL, anicteric sclera; MMM  Neck: supple  Cardiovascular: +S1/S2, RRR  Respiratory: few bibasilar crackles, good inspiratory effort; no distress  Gastrointestinal: obese and soft, NT/ND  Extremities: WWP; no edema, clubbing or cyanosis  Vascular: 2+ radial pulses B/L  Neurological: awake and alert; HANNAH    LABS:  CBC Full  -  ( 01 Sep 2020 05:50 )  WBC Count : 12.37 K/uL  RBC Count : 4.38 M/uL  Hemoglobin : 8.8 g/dL  Hematocrit : 32.2 %  Platelet Count - Automated : 372 K/uL  Mean Cell Volume : 73.5 fl  Mean Cell Hemoglobin : 20.1 pg  Mean Cell Hemoglobin Concentration : 27.3 gm/dL  Auto Neutrophil # : x  Auto Lymphocyte # : x  Auto Monocyte # : x  Auto Eosinophil # : x  Auto Basophil # : x  Auto Neutrophil % : x  Auto Lymphocyte % : x  Auto Monocyte % : x  Auto Eosinophil % : x  Auto Basophil % : x    09-01    141  |  96  |  20  ----------------------------<  121<H>  4.2   |  40<H>  |  1.07    Ca    9.1      01 Sep 2020 05:50  Phos  3.8     09-01  Mg     2.5     09-01    TPro  7.0  /  Alb  3.3  /  TBili  0.2  /  DBili  x   /  AST  18  /  ALT  35  /  AlkPhos  63  09-01    RADIOLOGY & ADDITIONAL STUDIES:  No interval study.

## 2020-09-01 NOTE — PHYSICAL THERAPY INITIAL EVALUATION ADULT - GENERAL OBSERVATIONS, REHAB EVAL
Pt received semi supine in bed +NC 2L +hep lock. PT received consent to treat from DIONE Molina. Pt left OOB in chair with call bell in reach. RN aware.

## 2020-09-01 NOTE — PROGRESS NOTE ADULT - PROBLEM SELECTOR PLAN 3
CXR showed bilateral pulmonary edema, exam notable for b/l LE edema, elevated pro-BNP and POCUS demonstrating B-lines. Pulmonary edema possibly 2/2 to decompensated systolic CHF due to underlying infectious etiology. Patient with improving LE swelling and no crackles on exam  - plan as above  - monitor I/Os, monitor UOP carefully. Goal net negative 1 L. Patient net neg -950 cc last 24h + 2 pitting edema, Decrease breath sounds, 98% on 2L  - Echo on 8/26/No significant valvular disease. trace TR Pulmonary artery systolic pressure is 76 mmHg.  The right ventricle is probably dilated. Right ventricular systolic function is reduced. (TAPSE) is 14.00 mm (normal >=17 mm). RV tissue Doppler S' is 16.00 cm/s (normal >10 cm/s). EF  30-35% with global hypokinesis.  - repeat echo when euvolemic   -STOPPED Lisinopril 2.5 and STARTED Entresto 24/26mg for 9/1 evening dose (36hrs after stopping lisinopril)  -CONTINUE Toprol 25mg BID   -CONTINUE Lasix 40mg IV BID  -strict I&O daily weight 1L fluid restriction + 2 pitting edema, Decrease breath sounds, 98% on 2L  - Echo on 8/26/No significant valvular disease. trace TR Pulmonary artery systolic pressure is 76 mmHg.  The right ventricle is probably dilated. Right ventricular systolic function is reduced. (TAPSE) is 14.00 mm (normal >=17 mm). RV tissue Doppler S' is 16.00 cm/s (normal >10 cm/s). EF  30-35% with global hypokinesis.  - repeat echo when euvolemic   -STOPPED Lisinopril 2.5 and STARTED Entresto 24/26mg for 9/1 evening dose (36hrs after stopping lisinopril)  -CONTINUE Toprol 25mg BID   -CONTINUE Lasix 40mg IV BID  -ischemic work up when euvolemic  -strict I&O daily weight 1L fluid restriction

## 2020-09-01 NOTE — PROGRESS NOTE ADULT - ASSESSMENT
51 y/o Male with PMHx HTN, chronic LE edema, and cardiac murmur (since childhood) who presents from his outpatient PCP office for hypoxia and SOB was found to be in acute hypoxic repsiratory failure, was admitted for Acute systolic heart failure and CAP. patient intially placed on Bipap with improvement in hypoxia then transitioned to HFNC and now to NC,  Patient underwent echo showing reduced EF 30-35% and global hypokinesis and pulmHTN without valvular disease. Patient has been diuresing with IV lasix, and started on toprol and lisinopril. Patient is now trasnferred to [facility] for cardiac optimization and ischemic evaluation.

## 2020-09-01 NOTE — PHYSICAL THERAPY INITIAL EVALUATION ADULT - IMPAIRMENTS CONTRIBUTING TO GAIT DEVIATIONS, PT EVAL
impaired postural control/impaired balance/pt demonstrates side to side sway during ambulation, decreased trunk and reciprocal UE use/decreased strength

## 2020-09-01 NOTE — PROGRESS NOTE ADULT - ATTENDING COMMENTS
Acute on chronic decompensated biventricular heart failure with improvement with diuresis, but now with metabolic alkalosis which needs to be judiciously managed to avoid respiratory compromise. Would add acetazolamide. Pulmonary hypertension is secondary to left sided heart disease as well as SHONA/OHS. Needs to be ruled out for SHONA/OHS with outpatient attended PSG with TCO2 monitoring; high probability of OHS given baseline bicarb of >27. Needs NIV of at least 15/8, and will need this for discharge. Of note, his baseline O2 is most likely in the low 90s, high 80s.

## 2020-09-01 NOTE — PROGRESS NOTE ADULT - PROBLEM SELECTOR PLAN 5
Patient with history of HTN, at home takes valsartan hctz 320-25 combo  -c/w Lisinopril 2.5 mg daily  -c/w Lasix 40mg PO daily - likely 2/2 to cardiac disease vs sleep apnea  - Echo as above  - pulmonology consulted/ continue to appreciate recs  - continue Bipap at night, needs sleep study on discharge

## 2020-09-01 NOTE — PROGRESS NOTE ADULT - SUBJECTIVE AND OBJECTIVE BOX
Transfer from [facility] to [facility] acceptance note:    Hospital course 49 y/o Male with PMHx HTN, chronic LE edema, and cardiac murmur (since childhood) who presents from his outpatient PCP office for hypoxia and SOB. In the ED T: 100.1 Rectal HR: 101-> 95 BP: 152/95 RR: 22 Saturating 79% on RA and 96% on NRB. Labs remarkable for WBC 19.73, Hgb 10.6, Plt 431, Na 129, Cl 87, CRP 11.88,  Procalcitonin .12, Trop .03, proBNP > 2000, . VBG demonstrating CXR demonstrating bilateral pulmonary infiltrate/edema. ICU consulted for hypoxic respiratory failure and r/o COVID. Repeat COVID swabs negative x3. Patient was placed on BiPAP overnight, given lasix 40mg, and started on ceftriaxone (5 day course) and azithromycin (3 day course) for PNA. His tachypnea improved on BiPAP and was placed on high flow. patient had an echo showing  reduced EF of 30-35% with global hypokinesis, and pulmHTN On 7Lachman, patient diuresed with IV lasix and fluid restricted with goal net negative -1L per day. For cardioprotection and mortality benefit, patient started on lisinopril 2.5 mg daily, Toprol XL 25 mg daily, and spironolactone 25 mg daily. Patient received 1 dose acetazolamide for elevated serum bicarbonate levels. Per cardiology attending, recommending stopping lisinopril and spironolactone Increasing Lasix to 40mg IV BID. Increase Toprol to 25mg BID. Patient is now transferred to [facility] for cardiac optimization and ischemic work up       	  MEDICATIONS:  furosemide   Injectable 40 milliGRAM(s) IV Push every 12 hours  metoprolol succinate ER 25 milliGRAM(s) Oral every 12 hours  cefTRIAXone   IVPB 1000 milliGRAM(s) IV Intermittent every 24 hours  enoxaparin Injectable 40 milliGRAM(s) SubCutaneous every 12 hours      [PHYSICAL EXAM:  TELEMETRY:  T(C): 37.4 (08-31-20 @ 21:51), Max: 37.4 (08-31-20 @ 21:51)  HR: 81 (09-01-20 @ 00:28) (74 - 87)  BP: 105/58 (09-01-20 @ 00:28) (105/58 - 131/63)  RR: 20 (09-01-20 @ 00:28) (17 - 22)  SpO2: 96% (09-01-20 @ 00:28) (96% - 100%)  Wt(kg): --  I&O's Summary    30 Aug 2020 07:01  -  31 Aug 2020 07:00  --------------------------------------------------------  IN: 750 mL / OUT: 1700 mL / NET: -950 mL    31 Aug 2020 07:01  -  01 Sep 2020 05:06  --------------------------------------------------------  IN: 0 mL / OUT: 875 mL / NET: -875 mL                              Appearance: Normal	  HEENT:   Normal oral mucosa, PERRL, EOMI	  Neck: Supple, + JVD/ - JVD; Carotid Bruit   Cardiovascular: Normal S1 S2, No JVD, No murmurs,   Respiratory: Lungs clear to auscultation/Decreased Breath Sounds/No Rales, Rhonchi, Wheezing	  Gastrointestinal:  Soft, Non-tender, + BS	  Skin: No rashes, No ecchymoses, No cyanosis  Extremities: Normal range of motion, No clubbing, cyanosis or edema  Vascular: Peripheral pulses palpable 2+ bilaterally  Neurologic: Non-focal  Psychiatry: A & O x 3, Mood & affect appropriate                        9.2    11.42 )-----------( 370      ( 31 Aug 2020 05:53 )             33.6     08-31    142  |  97  |  18  ----------------------------<  120<H>  4.4   |  38<H>  |  0.94    Ca    8.8      31 Aug 2020 05:53  Phos  4.2     08-31  Mg     2.5     08-31    TPro  7.4  /  Alb  3.5  /  TBili  0.3  /  DBili  x   /  AST  19  /  ALT  33  /  AlkPhos  67  08-31 Transfer from [facility] to [facility] acceptance note:    Hospital course 51 y/o Male with PMHx HTN, chronic LE edema, and cardiac murmur (since childhood) who presents from his outpatient PCP office for hypoxia and SOB. In the ED T: 100.1 Rectal HR: 101-> 95 BP: 152/95 RR: 22 Saturating 79% on RA and 96% on NRB. Labs remarkable for WBC 19.73, Hgb 10.6, Plt 431, Na 129, Cl 87, CRP 11.88,  Procalcitonin .12, Trop .03, proBNP > 2000, . VBG demonstrating CXR demonstrating bilateral pulmonary infiltrate/edema. ICU consulted for hypoxic respiratory failure and r/o COVID. Repeat COVID swabs negative x3. Patient was placed on BiPAP overnight, given lasix 40mg, and started on ceftriaxone (5 day course) and azithromycin (3 day course) for PNA. His tachypnea improved on BiPAP and was placed on high flow. patient had an echo showing  reduced EF of 30-35% with global hypokinesis, and pulmHTN On 7Lachman, patient diuresed with IV lasix and fluid restricted with goal net negative -1L per day. For cardioprotection and mortality benefit, patient started on lisinopril 2.5 mg daily, Toprol XL 25 mg daily, and spironolactone 25 mg daily. Patient received 1 dose acetazolamide for elevated serum bicarbonate levels. Per cardiology attending, recommending stopping lisinopril and spironolactone Increasing Lasix to 40mg IV BID. Increase Toprol to 25mg BID. Patient is now transferred to [facility] for cardiac optimization and ischemic work up       	  MEDICATIONS:  furosemide   Injectable 40 milliGRAM(s) IV Push every 12 hours  metoprolol succinate ER 25 milliGRAM(s) Oral every 12 hours  cefTRIAXone   IVPB 1000 milliGRAM(s) IV Intermittent every 24 hours  enoxaparin Injectable 40 milliGRAM(s) SubCutaneous every 12 hours      [PHYSICAL EXAM:  TELEMETRY:  T(C): 37.4 (08-31-20 @ 21:51), Max: 37.4 (08-31-20 @ 21:51)  HR: 81 (09-01-20 @ 00:28) (74 - 87)  BP: 105/58 (09-01-20 @ 00:28) (105/58 - 131/63)  RR: 20 (09-01-20 @ 00:28) (17 - 22)  SpO2: 96% (09-01-20 @ 00:28) (96% - 100%)  Wt(kg): --  I&O's Summary    30 Aug 2020 07:01  -  31 Aug 2020 07:00  --------------------------------------------------------  IN: 750 mL / OUT: 1700 mL / NET: -950 mL    31 Aug 2020 07:01  -  01 Sep 2020 05:06  --------------------------------------------------------  IN: 0 mL / OUT: 875 mL / NET: -875 mL                              Appearance: Obese  Neck: Supple, + JVD/ - JVD; Carotid Bruit   Cardiovascular: Normal S1 S2, +JVD, No murmurs,   Respiratory: Decreased breath sounds in bases  Skin: No rashes, No ecchymoses, No cyanosis  Extremities: + 2 pitting edema b/l  Vascular: nonpalpable 2/2 to edema  Neurologic: Non-focal  Psychiatry: A & O x 3, Mood & affect appropriate                        9.2    11.42 )-----------( 370      ( 31 Aug 2020 05:53 )             33.6     08-31    142  |  97  |  18  ----------------------------<  120<H>  4.4   |  38<H>  |  0.94    Ca    8.8      31 Aug 2020 05:53  Phos  4.2     08-31  Mg     2.5     08-31    TPro  7.4  /  Alb  3.5  /  TBili  0.3  /  DBili  x   /  AST  19  /  ALT  33  /  AlkPhos  67  08-31 Transfer from [facility] to [facility] acceptance note:    Hospital course 49 y/o Male with PMHx HTN, chronic LE edema, and cardiac murmur (since childhood) who presents from his outpatient PCP office for hypoxia and SOB. In the ED T: 100.1 Rectal HR: 101-> 95 BP: 152/95 RR: 22 Saturating 79% on RA and 96% on NRB. Labs remarkable for WBC 19.73, Hgb 10.6, Plt 431, Na 129, Cl 87, CRP 11.88,  Procalcitonin .12, Trop .03, proBNP > 2000, . VBG demonstrating CXR demonstrating bilateral pulmonary infiltrate/edema. ICU consulted for hypoxic respiratory failure and r/o COVID. Repeat COVID swabs negative x3. Patient was placed on BiPAP overnight, given lasix 40mg, and started on ceftriaxone (5 day course) and azithromycin (3 day course) for PNA. His tachypnea improved on BiPAP and was placed on high flow. patient had an echo showing  reduced EF of 30-35% with global hypokinesis, and pulmHTN On 7[place]achman, patient diuresed with IV lasix and fluid restricted with goal net negative -1L per day. For cardioprotection and mortality benefit, patient started on lisinopril 2.5 mg daily, Toprol XL 25 mg daily, and spironolactone 25 mg daily. Patient received 1 dose acetazolamide for elevated serum bicarbonate levels. Per cardiology attending, recommending stopping lisinopril and spironolactone Increasing Lasix to 40mg IV BID. Increase Toprol to 25mg BID. Patient is now transferred to [facility] for cardiac optimization and ischemic work up       	  MEDICATIONS:  furosemide   Injectable 40 milliGRAM(s) IV Push every 12 hours  metoprolol succinate ER 25 milliGRAM(s) Oral every 12 hours  cefTRIAXone   IVPB 1000 milliGRAM(s) IV Intermittent every 24 hours  enoxaparin Injectable 40 milliGRAM(s) SubCutaneous every 12 hours      [PHYSICAL EXAM:  TELEMETRY:  T(C): 37.4 (08-31-20 @ 21:51), Max: 37.4 (08-31-20 @ 21:51)  HR: 81 (09-01-20 @ 00:28) (74 - 87)  BP: 105/58 (09-01-20 @ 00:28) (105/58 - 131/63)  RR: 20 (09-01-20 @ 00:28) (17 - 22)  SpO2: 96% (09-01-20 @ 00:28) (96% - 100%)  Wt(kg): --  I&O's Summary    30 Aug 2020 07:01  -  31 Aug 2020 07:00  --------------------------------------------------------  IN: 750 mL / OUT: 1700 mL / NET: -950 mL    31 Aug 2020 07:01  -  01 Sep 2020 05:06  --------------------------------------------------------  IN: 0 mL / OUT: 875 mL / NET: -875 mL                              Appearance: Obese  Neck: Supple, + JVD/ - JVD; Carotid Bruit   Cardiovascular: Normal S1 S2, +JVD, No murmurs,   Respiratory: Decreased breath sounds in bases  Skin: No rashes, No ecchymoses, No cyanosis  Extremities: + 2 pitting edema b/l  Vascular: nonpalpable 2/2 to edema  Neurologic: Non-focal  Psychiatry: A & O x 3, Mood & affect appropriate                        9.2    11.42 )-----------( 370      ( 31 Aug 2020 05:53 )             33.6     08-31    142  |  97  |  18  ----------------------------<  120<H>  4.4   |  38<H>  |  0.94    Ca    8.8      31 Aug 2020 05:53  Phos  4.2     08-31  Mg     2.5     08-31    TPro  7.4  /  Alb  3.5  /  TBili  0.3  /  DBili  x   /  AST  19  /  ALT  33  /  AlkPhos  67  08-31

## 2020-09-01 NOTE — PROGRESS NOTE ADULT - PROBLEM SELECTOR PLAN 7
Patient found to have slowly uptrending serum bicarbonate levels. Could be secondary to chronic CO2 retention. Vital signs stable and patient without any respiratory complaints at this time.   - Given one dose acetazolamide 250 mg PO   - Monitor AM labs Patient found to have low Hgb 10.0 on admission without any signs of bleeding.   - AM 8/31 Hgb 9.2  - Continue to monitor  - f/u iron panel #Resolved  Patient found to have slowly uptrending serum bicarbonate levels. Could be secondary to chronic CO2 retention. Vital signs stable and patient without any respiratory complaints at this time.   - Given one dose acetazolamide 250 mg PO   - Monitor AM labs    VTE: Lovenox  Dispo: Pending diuresis and ischemic eval

## 2020-09-01 NOTE — PROGRESS NOTE ADULT - PROBLEM SELECTOR PLAN 8
F: None  E: Replete K<4. Mg<2  N: DASH/TLC diet     DVT PPx: Lovenox  Dispo: [facility]  CODE: FULL CODE Patient found to have slowly uptrending serum bicarbonate levels. Could be secondary to chronic CO2 retention. Vital signs stable and patient without any respiratory complaints at this time.   - Given one dose acetazolamide 250 mg PO   - Monitor AM labs

## 2020-09-01 NOTE — PROGRESS NOTE ADULT - PROBLEM SELECTOR PLAN 3
STOP-BANG = 7, high risk for underlying SHONA and likely OHS component as well    Recommendations:  - as discussed above, will need formal sleep study done as outpatient   - please obtain room air ABG today to eval for CO2 retention  - would continue BiPAP at night with minimum IPAP of 15, and EPAP of 8 (15/8); supplemental O2/FiO2 on BiPAP as needed

## 2020-09-01 NOTE — PHYSICAL THERAPY INITIAL EVALUATION ADULT - GAIT DEVIATIONS NOTED, PT EVAL
pt desat during seated EOB therex on room air to 87%. pt amb with 2L O2, SpO2 >95% during ambulation/decreased yelitza/decreased step length/decreased stride length/increased time in double stance/increased stride width

## 2020-09-01 NOTE — PHYSICAL THERAPY INITIAL EVALUATION ADULT - CRITERIA FOR SKILLED THERAPEUTIC INTERVENTIONS
rehab potential/predicted duration of therapy intervention/functional limitations in following categories/risk reduction/prevention/impairments found/therapy frequency/anticipated discharge recommendation

## 2020-09-01 NOTE — PROGRESS NOTE ADULT - PROBLEM SELECTOR PLAN 2
Admission serum bicarb 29 which suggests baseline component of CO2 retention which he is likely compensated for. Now however, bicarb has increased to 40 after receiving diuresis over past few days which can cause worsening CO2 retention.    Recommendations:  - suggest diamox 500mg x1 now

## 2020-09-01 NOTE — PROGRESS NOTE ADULT - PROBLEM SELECTOR PLAN 4
Likely secondary to decompensated heart failure and has improved with diuresis. Can continue to monitor off O2

## 2020-09-01 NOTE — PROGRESS NOTE ADULT - PROBLEM SELECTOR PLAN 1
Weaned from HFNC to nasal canula.   - CXR 8/31: b/l opacities that are resolving since admission  - CT Angio 8/28: suggestive of pulmonary edema  - weaned to 4L NC, satting 97%  - Place on BiPAP overnight  - c/w Lasix 40mg PO daily  - c/w lisinopril 2.5 mg daily, monitor K+ levels  - added spironolactone 25 mg daily and toprol XL 25 mg daily as both serve cardioprotection and decrease mortality  - pulmonary consult service following, appreciate recs Weaned from HFNC to nasal canula.   - CXR 8/31: b/l opacities that are resolving since admission  - CT Angio 8/28: suggestive of pulmonary edema  - weaned to 2L NC  - Place on BiPAP overnight  -see below

## 2020-09-01 NOTE — PROGRESS NOTE ADULT - PROBLEM SELECTOR PLAN 4
Patient with history of HTN, at home takes valsartan hctz 320-25 combo  -c/w Lisinopril 2.5 mg daily  -c/w Lasix 40mg PO daily CXR showed bilateral pulmonary edema, exam notable for b/l LE edema, elevated pro-BNP and POCUS demonstrating B-lines. Pulmonary edema possibly 2/2 to decompensated systolic CHF due to underlying infectious etiology. Patient with improving LE swelling and no crackles on exam  - plan as above  - monitor I/Os, monitor UOP carefully. Goal net negative 1 L. Patient net neg -950 cc last 24h Patient with history of HTN, at home takes valsartan hctz 320-25 combo  -Continue lasix 40IV BID and ENtresto 24/26mg  -

## 2020-09-01 NOTE — PROGRESS NOTE ADULT - PROBLEM SELECTOR PLAN 1
Found on TTE to have estimated PASP 76mmHg in the setting of decompensated systolic CHF and right heart failure, all of which are new diagnoses for him. 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 Will need additional workup to r/o other etiologies.    Recommendations:  - will need a formal sleep study/PSG performed as an outpatient to eval for SHONA  - please also do right heart cath with vasodilator testing when patient undergoes ischemic eval with left heart cath, as the former will help r/o group I pulmonary HTN

## 2020-09-01 NOTE — PROGRESS NOTE ADULT - ASSESSMENT
49yo man and never smoker w/ PMH systemic HTN admitted for acute hypoxemic respiratory failure in the setting of acute decompensated systolic CHF including right heart failure and severe pulmonary HTN. Pulmonary consulted for management and workup recommendations for pulmonary HTN.

## 2020-09-01 NOTE — PHYSICAL THERAPY INITIAL EVALUATION ADULT - PERTINENT HX OF CURRENT PROBLEM, REHAB EVAL
Pt is 49 yo male who presents with SOB and acute hypoxic respiratory failure. patient intially placed on Bipap with improvement in hypoxia then transitioned to HFNC and now to NC,  Patient underwent echo showing reduced EF 30-35% and global hypokinesis and pulmHTN without valvular disease. Patient is now trasnferred to [facility] for cardiac optimization and ischemic evaluation.

## 2020-09-01 NOTE — PHYSICAL THERAPY INITIAL EVALUATION ADULT - PLANNED THERAPY INTERVENTIONS, PT EVAL
strengthening/transfer training/balance training/neuromuscular re-education/postural re-education/bed mobility training/gait training

## 2020-09-01 NOTE — PROGRESS NOTE ADULT - ATTENDING COMMENTS
See PA note written above, for details. I reviewed the PA documentation.  I have personally seen and examined this patient 9/1/20. I reviewed vitals, labs, medications, cardiac studies and additional imaging.  I agree with the PA's findings and plans as written above with the following additions/amendments:  50AAM with morbid obesity (BMI 50.2) with Essential HTN admitted with Acute Hypoxemic respiratory failure due to acute decompensated systolic CHF with reduced EF.   Patient currently remains hypervolemic on exam with anasarca, diminished breath sounds in bases. Difficult to appreciate neck veins given obese habitus.    1) Acute Decompensated Heart Failure, now clinically Improved  -Patient admitted with Hypoxic Respiratory failure, clinically overloaded, diuresed and treated for possible underlying CAP with improvement in symptoms.   -Tele and EKG Reviewed. Patient in Sinus with RAD, frequent PVCS and small run of NSVT on tele (8 beats)  -Echo with Evidence of Reduced LV function 30-35% with global hypokinesis and Elevated Right sided pressures.  -Recommend repeating echo with contrast enhancing agent for EF given poor quality images in setting of large body habitus  -Pt with likely righ heart failure from SHONA versus obesity hypoventilation syndrome. He would benefit from outpatient sleep study. Pulm following. Appreciate recommendations  -At this time recommend continued diuresis for improvement in Symptoms and PA pressures. WOuld dose 40 IV lasix additional and start on Lasix 40 mg IV BID with close I/O's monitoring. Would discontinue spironolactone for now  -Would discontinued Lisinopril (given today) and start patient on low dose Entresto 24/26 mg in 36 hours.    Plan for:  Diurese with IV Lasix 40 BID  Metoprolol 25 XL daily  Entresto 24./26 BID  Repeat TTE with Definity for better structural assessment when euvolemic  Strict I/O, use handheld urinal for output collection  Daily STANDING weights, core measure orders  ACE wrap lower extremities to the knees bilaterally  Bedside CHF Education to begin upon transfer, patient watching CHF videos today  Advanced CHF consultation for assistance with management of decompensated state  PT evaluation, patient to be referred to cardiac rehabilitation upon discharge  CPAP at night and with naps for clinical s/sx SHONA  Pulmonology closely following for pHTN  Future planning: patient to have appt made with Cardiologist within 1 week of discharge for quality improvement CHF readmission risk reduction  Future planning: patient to have R/LHC with vasodilator study prior to discharge  ABBY Perez.  Cardiology Attending See PA note written above, for details. I reviewed the PA documentation.  I have personally seen and examined this patient 9/1/20. I reviewed vitals, labs, medications, cardiac studies and additional imaging.  I agree with the PA's findings and plans as written above with the following additions/amendments:  50AAM with morbid obesity (BMI 50.2) with Essential HTN admitted with Acute Hypoxemic respiratory failure due to acute decompensated systolic CHF with reduced EF.   Patient currently remains hypervolemic on exam with anasarca, diminished breath sounds in bases. Difficult to appreciate neck veins given obese habitus.    1) Acute Decompensated Heart Failure, now clinically Improved  -Patient admitted with Hypoxic Respiratory failure, clinically overloaded, diuresed and treated for possible underlying CAP with improvement in symptoms.   -Tele and EKG Reviewed. Patient in Sinus with RAD, frequent PVCS and small run of NSVT on tele (8 beats)  -Echo with Evidence of Reduced LV function 30-35% with global hypokinesis and Elevated Right sided pressures.  -Recommend repeating echo with contrast enhancing agent for EF given poor quality images in setting of large body habitus  -Pt with likely righ heart failure from SHONA versus obesity hypoventilation syndrome. He would benefit from outpatient sleep study. Pulm following. Appreciate recommendations  -At this time recommend continued diuresis for improvement in Symptoms and PA pressures. WOuld dose 40 IV lasix additional and start on Lasix 40 mg IV BID with close I/O's monitoring. Would discontinue spironolactone for now  -Would discontinued Lisinopril (given today) and start patient on low dose Entresto 24/26 mg in 36 hours.    Plan for:  Diurese with IV Lasix 40 BID  Metoprolol 25 XL daily  Entresto 24./26 BID  Finishing course of Ceftriaxone for CAP  IV iron x 5D while inpatient for significant BRUNA  Repeat TTE with Definity for better structural assessment when euvolemic  ACE wrap lower extremities to the knees bilaterally  Bedside CHF Education to begin upon transfer, patient watching CHF videos today  Advanced CHF consultation for assistance with management of decompensated state  PT evaluation, patient to be referred to cardiac rehabilitation upon discharge  CPAP at night and with naps for clinical s/sx SHONA  Pulmonology closely following for pHTN  Future planning: patient to have appt made with Cardiologist within 1 week of discharge for quality improvement CHF readmission risk reduction  Future planning: patient to have R/LHC with vasodilator study prior to discharge  ABBY Perez.  Cardiology Attending See PA note written above, for details. I reviewed the PA documentation.  I have personally seen and examined this patient 9/1/20. I reviewed vitals, labs, medications, cardiac studies and additional imaging.  I agree with the PA's findings and plans as written above with the following additions/amendments:  50AAM with morbid obesity (BMI 50.2) with Essential HTN admitted with Acute Hypoxemic respiratory failure due to acute decompensated systolic CHF with reduced EF.   Patient currently remains hypervolemic on exam with anasarca, diminished breath sounds in bases. Difficult to appreciate neck veins given obese habitus.  Plan for:  Diurese with IV Lasix 40 BID  Metoprolol 25 XL daily  Entresto 24./26 BID  Finishing course of Ceftriaxone for CAP  IV iron x 5D while inpatient for significant BRUNA  Repeat TTE with Definity for better structural assessment when euvolemic  ACE wrap lower extremities to the knees bilaterally  Bedside CHF Education to begin upon transfer, patient watching CHF videos today  Advanced CHF consultation for assistance with management of decompensated state  PT evaluation, patient to be referred to cardiac rehabilitation upon discharge  CPAP at night and with naps for clinical s/sx SHONA  Pulmonology closely following for pHTN  Future planning: patient to have appt made with Cardiologist within 1 week of discharge for quality improvement CHF readmission risk reduction  Future planning: patient to have R/LHC with vasodilator study prior to discharge  ABBY Perez.  Cardiology Attending

## 2020-09-02 DIAGNOSIS — D50.9 IRON DEFICIENCY ANEMIA, UNSPECIFIED: ICD-10-CM

## 2020-09-02 DIAGNOSIS — E11.69 TYPE 2 DIABETES MELLITUS WITH OTHER SPECIFIED COMPLICATION: ICD-10-CM

## 2020-09-02 DIAGNOSIS — J96.21 ACUTE AND CHRONIC RESPIRATORY FAILURE WITH HYPOXIA: ICD-10-CM

## 2020-09-02 DIAGNOSIS — G47.33 OBSTRUCTIVE SLEEP APNEA (ADULT) (PEDIATRIC): ICD-10-CM

## 2020-09-02 LAB
ANION GAP SERPL CALC-SCNC: 12 MMOL/L — SIGNIFICANT CHANGE UP (ref 5–17)
BUN SERPL-MCNC: 19 MG/DL — SIGNIFICANT CHANGE UP (ref 7–23)
CALCIUM SERPL-MCNC: 9.2 MG/DL — SIGNIFICANT CHANGE UP (ref 8.4–10.5)
CHLORIDE SERPL-SCNC: 100 MMOL/L — SIGNIFICANT CHANGE UP (ref 96–108)
CO2 SERPL-SCNC: 32 MMOL/L — HIGH (ref 22–31)
CREAT SERPL-MCNC: 1.1 MG/DL — SIGNIFICANT CHANGE UP (ref 0.5–1.3)
CULTURE RESULTS: SIGNIFICANT CHANGE UP
CULTURE RESULTS: SIGNIFICANT CHANGE UP
GLUCOSE BLDC GLUCOMTR-MCNC: 120 MG/DL — HIGH (ref 70–99)
GLUCOSE BLDC GLUCOMTR-MCNC: 120 MG/DL — HIGH (ref 70–99)
GLUCOSE BLDC GLUCOMTR-MCNC: 121 MG/DL — HIGH (ref 70–99)
GLUCOSE BLDC GLUCOMTR-MCNC: 126 MG/DL — HIGH (ref 70–99)
GLUCOSE SERPL-MCNC: 120 MG/DL — HIGH (ref 70–99)
HCT VFR BLD CALC: 35.8 % — LOW (ref 39–50)
HGB BLD-MCNC: 9.8 G/DL — LOW (ref 13–17)
MAGNESIUM SERPL-MCNC: 2.3 MG/DL — SIGNIFICANT CHANGE UP (ref 1.6–2.6)
MCHC RBC-ENTMCNC: 20.1 PG — LOW (ref 27–34)
MCHC RBC-ENTMCNC: 27.4 GM/DL — LOW (ref 32–36)
MCV RBC AUTO: 73.4 FL — LOW (ref 80–100)
NRBC # BLD: 0 /100 WBCS — SIGNIFICANT CHANGE UP (ref 0–0)
PLATELET # BLD AUTO: 408 K/UL — HIGH (ref 150–400)
POTASSIUM SERPL-MCNC: 4.1 MMOL/L — SIGNIFICANT CHANGE UP (ref 3.5–5.3)
POTASSIUM SERPL-SCNC: 4.1 MMOL/L — SIGNIFICANT CHANGE UP (ref 3.5–5.3)
RBC # BLD: 4.88 M/UL — SIGNIFICANT CHANGE UP (ref 4.2–5.8)
RBC # FLD: 20.3 % — HIGH (ref 10.3–14.5)
SODIUM SERPL-SCNC: 144 MMOL/L — SIGNIFICANT CHANGE UP (ref 135–145)
SPECIMEN SOURCE: SIGNIFICANT CHANGE UP
SPECIMEN SOURCE: SIGNIFICANT CHANGE UP
WBC # BLD: 10.77 K/UL — HIGH (ref 3.8–10.5)
WBC # FLD AUTO: 10.77 K/UL — HIGH (ref 3.8–10.5)

## 2020-09-02 PROCEDURE — 99222 1ST HOSP IP/OBS MODERATE 55: CPT | Mod: GC

## 2020-09-02 PROCEDURE — 99233 SBSQ HOSP IP/OBS HIGH 50: CPT | Mod: GC

## 2020-09-02 PROCEDURE — 99233 SBSQ HOSP IP/OBS HIGH 50: CPT

## 2020-09-02 PROCEDURE — 99232 SBSQ HOSP IP/OBS MODERATE 35: CPT

## 2020-09-02 RX ORDER — ASPIRIN/CALCIUM CARB/MAGNESIUM 324 MG
81 TABLET ORAL DAILY
Refills: 0 | Status: DISCONTINUED | OUTPATIENT
Start: 2020-09-03 | End: 2020-09-05

## 2020-09-02 RX ORDER — ASPIRIN/CALCIUM CARB/MAGNESIUM 324 MG
325 TABLET ORAL ONCE
Refills: 0 | Status: COMPLETED | OUTPATIENT
Start: 2020-09-02 | End: 2020-09-02

## 2020-09-02 RX ADMIN — Medication 40 MILLIGRAM(S): at 17:22

## 2020-09-02 RX ADMIN — SACUBITRIL AND VALSARTAN 1 TABLET(S): 24; 26 TABLET, FILM COATED ORAL at 06:19

## 2020-09-02 RX ADMIN — CEFTRIAXONE 100 MILLIGRAM(S): 500 INJECTION, POWDER, FOR SOLUTION INTRAMUSCULAR; INTRAVENOUS at 14:35

## 2020-09-02 RX ADMIN — ENOXAPARIN SODIUM 40 MILLIGRAM(S): 100 INJECTION SUBCUTANEOUS at 06:19

## 2020-09-02 RX ADMIN — Medication 25 MILLIGRAM(S): at 11:13

## 2020-09-02 RX ADMIN — SACUBITRIL AND VALSARTAN 1 TABLET(S): 24; 26 TABLET, FILM COATED ORAL at 19:25

## 2020-09-02 RX ADMIN — IRON SUCROSE 110 MILLIGRAM(S): 20 INJECTION, SOLUTION INTRAVENOUS at 11:13

## 2020-09-02 RX ADMIN — Medication 25 MILLIGRAM(S): at 21:31

## 2020-09-02 RX ADMIN — Medication 40 MILLIGRAM(S): at 06:19

## 2020-09-02 RX ADMIN — Medication 325 MILLIGRAM(S): at 17:22

## 2020-09-02 NOTE — PROGRESS NOTE ADULT - SUBJECTIVE AND OBJECTIVE BOX
PULMONARY CONSULT SERVICE FOLLOW-UP NOTE    INTERVAL HPI:  Reviewed chart and overnight events; patient seen and examined at bedside.  - Using BiPAP overnight and feels rested in the AM  - No new respiratory symptoms and continues to feel steady improvement; LE swelling improved  - No CP, palpitations, dyspnea. Has ambulated on his own to bathroom but has not yet walked halls.     MEDICATIONS:  Pulmonary:    Antimicrobials:  cefTRIAXone   IVPB 1000 milliGRAM(s) IV Intermittent every 24 hours    Anticoagulants:  enoxaparin Injectable 40 milliGRAM(s) SubCutaneous every 12 hours    Cardiac:  furosemide   Injectable 40 milliGRAM(s) IV Push every 12 hours  metoprolol succinate ER 25 milliGRAM(s) Oral every 12 hours  sacubitril 24 mG/valsartan 26 mG 1 Tablet(s) Oral every 12 hours    Allergies    No Known Allergies    Intolerances    Vital Signs Last 24 Hrs  T(C): 36.2 (02 Sep 2020 06:48), Max: 36.9 (01 Sep 2020 19:07)  T(F): 97.2 (02 Sep 2020 06:48), Max: 98.5 (01 Sep 2020 19:07)  HR: 67 (02 Sep 2020 06:17) (67 - 87)  BP: 115/62 (02 Sep 2020 05:30) (107/59 - 128/66)  BP(mean): 95 (01 Sep 2020 18:50) (95 - 95)  RR: 20 (02 Sep 2020 05:30) (18 - 86)  SpO2: 94% (02 Sep 2020 06:17) (86% - 100%)    09-01 @ 07:01  -  09-02 @ 07:00  --------------------------------------------------------  IN: 330 mL / OUT: 3225 mL / NET: -2895 mL    PHYSICAL EXAM:  Constitutional: obese man resting comfortably in bed; NAD  HEENT: NC/AT; PERRL, anicteric sclera; MMM  Neck: supple  Cardiovascular: +S1/S2, RRR  Respiratory: CTA B/L with interval resolution of adventitious sounds  Gastrointestinal: obese and soft, NT/ND  Extremities: WWP; no edema, clubbing or cyanosis  Vascular: 2+ radial pulses B/L  Neurological: awake and alert; HANNAH    LABS:  ABG - ( 01 Sep 2020 18:31 )  pH, Arterial: 7.40  pH, Blood: x     /  pCO2: 62    /  pO2: 61    / HCO3: 37    / Base Excess: 10.2  /  SaO2: 88        CBC Full  -  ( 02 Sep 2020 07:41 )  WBC Count : 10.77 K/uL  RBC Count : 4.88 M/uL  Hemoglobin : 9.8 g/dL  Hematocrit : 35.8 %  Platelet Count - Automated : 408 K/uL  Mean Cell Volume : 73.4 fl  Mean Cell Hemoglobin : 20.1 pg  Mean Cell Hemoglobin Concentration : 27.4 gm/dL  Auto Neutrophil # : x  Auto Lymphocyte # : x  Auto Monocyte # : x  Auto Eosinophil # : x  Auto Basophil # : x  Auto Neutrophil % : x  Auto Lymphocyte % : x  Auto Monocyte % : x  Auto Eosinophil % : x  Auto Basophil % : x    09-02    144  |  100  |  19  ----------------------------<  120<H>  4.1   |  32<H>  |  1.10    Ca    9.2      02 Sep 2020 07:41  Phos  3.8     09-01  Mg     2.3     09-02    TPro  7.0  /  Alb  3.3  /  TBili  0.2  /  DBili  x   /  AST  18  /  ALT  35  /  AlkPhos  63  09-01    RADIOLOGY & ADDITIONAL STUDIES:  No recent study for review

## 2020-09-02 NOTE — PROGRESS NOTE ADULT - PROBLEM SELECTOR PROBLEM 5
Pulmonary hypertension Acute hypoxemic respiratory failure Acute on chronic respiratory failure with hypoxia and hypercapnia

## 2020-09-02 NOTE — PROGRESS NOTE ADULT - PROBLEM SELECTOR PLAN 5
- likely 2/2 to cardiac disease vs sleep apnea  - Echo as above  - pulmonology consulted/ continue to appreciate recs  - continue Bipap at night, needs sleep study on discharge RESOLVED Currently satting 94% on RA   - CXR 8/31: b/l opacities that are resolving since admission  - CT Angio 8/28: suggestive of pulmonary edema  - weaned to 2L NC  - Place on BiPAP overnight  -see below

## 2020-09-02 NOTE — PROGRESS NOTE ADULT - PROBLEM SELECTOR PLAN 8
Patient found to have low Hgb 10.0 on admission without any signs of bleeding.   - Hgb improving to 9.8 9/2  -iron studies c/w severe BRUNA and receiving IV iron x 5 doses   - Continue to monitor

## 2020-09-02 NOTE — PROGRESS NOTE ADULT - PROBLEM SELECTOR PLAN 2
initially tachypnea, tachycardia, febrile 100.1 rectally, and WBC > 19,   - Afebrile WBC continues to downtrend   - CONTINUE Ceftriaxone 1 g for 5 days (8/29 -9/2 )   - COMPLETED Azithromycin 500 mg for 3 days (8/29 -8/31) to cover CAP  - Repeat procal 8/31: 0.07 (downtrending)  - f/u blood cx - NGTD   - Urine cx NGTD   - UA negative  - Covid negative x3. F/u Covid antibodies   - HIV negative - likely 2/2 to cardiac disease with underlying sleep apnea  - Echo as above  - pulmonology consulted/ continue to appreciate recs

## 2020-09-02 NOTE — PROGRESS NOTE ADULT - PROBLEM SELECTOR PLAN 7
#Resolved  Patient found to have slowly uptrending serum bicarbonate levels. Could be secondary to chronic CO2 retention. Vital signs stable and patient without any respiratory complaints at this time.   - Given one dose acetazolamide 250 mg PO   - Monitor AM labs    VTE: Lovenox  Dispo: Pending diuresis and ischemic eval home med: valsartan hctz 320-25 d/c   -Continue lasix 40IV BID and ENtresto 24/26mg

## 2020-09-02 NOTE — PROGRESS NOTE ADULT - ASSESSMENT
51 y/o Male with PMHx HTN, chronic LE edema, and cardiac murmur (since childhood) who presents from his outpatient PCP office for hypoxia and SOB was found to be in acute hypoxic repsiratory failure, was admitted for Acute systolic heart failure and CAP. patient intially placed on Bipap with improvement in hypoxia then transitioned to HFNC and now to NC,  Patient underwent echo showing reduced EF 30-35% and global hypokinesis and pulmHTN without valvular disease. Patient has been diuresing with IV lasix, and started on toprol and lisinopril. Patient trasnferred to [facility] for cardiac optimization and ischemic evaluation. 51 y/o Male with PMHx HTN, chronic LE edema, and cardiac murmur (since childhood) who presents from his outpatient PCP office for hypoxia and SOB was found to be in acute hypoxic repsiratory failure, was admitted for Acute systolic heart failure and CAP. patient intially placed on Bipap with improvement in hypoxia then transitioned to HFNC and now to NC,  Patient underwent echo showing reduced EF 30-35% and global hypokinesis and pulmHTN without valvular disease. Patient has been diuresing with IV lasix, and started on toprol and lisinopril. Patient tx to [facility] for further management of acute sCHF and plan for R+LHC with vasodilator study tomorrow 9/3

## 2020-09-02 NOTE — PROGRESS NOTE ADULT - PROBLEM SELECTOR PLAN 1
Weaned from HFNC to nasal canula.   - CXR 8/31: b/l opacities that are resolving since admission  - CT Angio 8/28: suggestive of pulmonary edema  - weaned to 2L NC  - Place on BiPAP overnight  -see below + 2 pitting edema, Decrease breath sounds, satting 94% on RA   -Echo on 8/26/No significant valvular disease. trace TR Pulmonary artery systolic pressure is 76 mmHg. RV probably dilated. RVSF reduced. (TAPSE) is 14.00 mm (normal >=17 mm). RV tissue Doppler S' is 16.00 cm/s (normal >10 cm/s). EF  30-35% with global hypokinesis.  - repeat echo when euvolemic   -STOPPED Lisinopril 2.5 and STARTED Entresto 24/26mg on 9/1 PM  -CONTINUE Toprol 25mg BID, Lasix 40mg IV BID  -R+ LHC with vasodilator (NO) testing tomorrow 9/3/20   -strict I&O daily weight 1L fluid restriction. + 2 pitting edema, Decrease breath sounds, satting 94% on RA   -Echo on 8/26/No significant valvular disease. trace TR Pulmonary artery systolic pressure is 76 mmHg. RV probably dilated. RVSF reduced. (TAPSE) is 14.00 mm (normal >=17 mm). RV tissue Doppler S' is 16.00 cm/s (normal >10 cm/s). EF  30-35% with global hypokinesis.  - repeat echo when euvolemic   -STOPPED Lisinopril 2.5 and STARTED Entresto 24/26mg on 9/1 PM  -CONTINUE Toprol 25mg BID, Lasix 40mg IV BID  -R+ LHC with vasodilator (NO) testing tomorrow 9/3/20 with Dr Figueroa  -loaded with aspirin 325 mg x1 and to c/w aspirin 81 mg qd starting 9/3. Load with plavix on table if necessary  -consent in chart and on schedule, NPO after midnight   -strict I&O daily weight 1L fluid restriction.

## 2020-09-02 NOTE — PROGRESS NOTE ADULT - PROBLEM SELECTOR PLAN 1
Found on TTE to have estimated PASP 76mmHg in the setting of decompensated systolic CHF and right heart failure, all of which are new diagnoses for him. 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 Will need additional workup to r/o other etiologies.    Recommendations:  - will need a formal sleep study/PSG performed as an outpatient to eval for SHONA  - tentatively planned for left+right heart cath with vasodilator testing as part of ischemic workup, with right heart study to help r/o group I pulmonary arterial hypertension

## 2020-09-02 NOTE — PROGRESS NOTE ADULT - PROBLEM SELECTOR PLAN 6
Patient found to have low Hgb 10.0 on admission without any signs of bleeding.   - AM 8/31 Hgb 9.2  - Continue to monitor  - f/u iron panel initially tachypnea, tachycardia, febrile 100.1 rectally, and WBC > 19,   - Afebrile WBC continues to downtrend   - CONTINUE Ceftriaxone 1 g for 5 days (8/29 -9/2 )   - COMPLETED Azithromycin 500 mg for 3 days (8/29 -8/31) to cover CAP  - Repeat procal 8/31: 0.07 (downtrending)  - blood cx - NGTD   - Urine cx NGTD   - UA negative  - Covid negative x3. F/u Covid antibodies   - HIV negative

## 2020-09-02 NOTE — PROGRESS NOTE ADULT - PROBLEM SELECTOR PLAN 2
Baseline/admission serum bicarb 29, which is very suggestive of chronic respiratory acidosis such as from obesity hypoventilation syndrome (OHS). Had increased to 40 from diuresis and now improving after dose of diamox yesterday.     Recommendations:  - continue to monitor serum bicarb closely while being aggressively diuresed, may need additional acetazolamide (diamox) 250mg dose based on diuretic plan

## 2020-09-02 NOTE — PROGRESS NOTE ADULT - PROBLEM SELECTOR PLAN 3
STOP-BANG = 7, high risk for underlying SHONA and probably with OHS component as well. Per primary team have witnessed apneas w/ concomitant desaturation while pt sleeping on telemetry monitor which also supports this diagnosis. Can only be formally diagnosed by polysomnography however. Room air ABG 9/1 consistent with compensated respiratory acidosis.     Recommendations:  - as discussed above, will need formal sleep study done as outpatient   - would continue BiPAP at night with minimum IPAP of 15, and EPAP of 8 (15/8); supplemental O2/FiO2 on BiPAP as needed

## 2020-09-02 NOTE — CONSULT NOTE ADULT - SUBJECTIVE AND OBJECTIVE BOX
HPI: 49 y/o M with hx of HTN and chronic LE edema , and cardiac murmur (since childhood), that presents from his outpatient PCP office for hypoxia and SOB. Pt states for the past 1-2 weeks he has been experiencing anxiety which has resulted in insomnia. He reached out this sister/cousin who is a nurse who recommended to take Benadryl to help with sleep. Pt ingested benadryl on 8/26 and states the following day developed SOB , increased anxiety, and trouble breathing. Pt denies any tobacco, vape, or drug use. States he has no pets at home. Has been quarantining during the pandemic and has no sick contacts. 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 Of note pt states he tried CBD oil 2 weeks ago which was a droplet on tongue but hasnt taken it in 1 week and takes supplements of yisel, turmeric, and moringa. Otherwise Denies any CP, abdominal pain,  N/V, Diarrhea, F, or chills.     In the ED T: 100.1 Rectal HR: 101-> 95 BP: 152/95 RR: 22 Saturating 79% on RA and 96% on NRB . Labs remarkable for WBC 19.73, Hgb 10.6, Plt 431, Na 129, Cl 87, CRP 11.88,  Procalcitonin .12, Trop .03, proBNP > 2000, . VBG demonstrating CXR demonstrating bilateral pulmonary infiltrate/edema. ICU consulted for hypoxic respiratory failure and r/o COVID.   admitted for acute hypoxemic respiratory failure in the setting of acute decompensated systolic CHF including right heart failure and severe pulmonary HTN. Pulmonary consulted for management and workup recommendations for pulmonary HTN,  Patient underwent echo showing reduced EF 30-35% and global hypokinesis and pulmHTN without valvular disease. Patient has been diuresing with IV lasix, and started on toprol and lisinopril  Plan for cath tomorrow.    ·  Problem: Community acquired pneumonia.  Plan: initially tachypnea, tachycardia, febrile 100.1 rectally, and WBC > 19,   - Afebrile WBC continues to downtrend   - CONTINUE Ceftriaxone 1 g for 5 days (8/29 -9/2 )   - COMPLETED Azithromycin 500 mg for 3 days (8/29 -8/31) to cover CAP  Problem: Acute systolic (congestive) heart failure.  Plan: + 2 pitting edema, Decrease breath sounds, 98% on 2L  - Echo on 8/26/No significant valvular disease. trace TR Pulmonary artery systolic pressure is 76 mmHg.  The right ventricle is probably dilated. Right ventricular systolic function is reduced. (TAPSE) is 14.00 mm (normal >=17 mm). RV tissue Doppler S' is 16.00 cm/s (normal >10 cm/s). EF  30-35% with global hypokinesis.  - repeat echo when euvolemic   -STOPPED Lisinopril 2.5 and STARTED Entresto 24/26mg for 9/1 evening dose (36hrs after stopping lisinopril)  -CONTINUE Toprol 25mg BID   -CONTINUE Lasix 40mg IV BID  -ischemic work up when euvolemic  -strict I&O daily weight 1L fluid restriction.   Pulmonary hypertension.  Plan: - likely 2/2 to cardiac disease vs sleep apnea  + BRUNA  FSG & insulin:    Dinner FSG   Lispro   +    Ate  Bedtime FSG     Lantus      and Lispro         Breakfast FSG   Lispro    +    Ate  Lunch FSG      Lispro    +    Ate      Age at Dx:  How dx:  Hx and duration of insulin:  Current Therapy:  Hx of other regimens:    Home FSG:  Fasting  Lunch  Dinner  Bed    Diet:  Exercise:    Hx of hypoglycemia:  Hx of DKA/HHS:  Complications:  Outpatient Endo:    FH:  DM:  Thyroid:  Autoimmune:  Other:    SH: Lives alone, doesn't smoke, drinks alcohol socially, no recreational drug use.      PMH & Surgical Hx:SOB  Handoff  MEWS Score  HTN (hypertension)  HTN (hypertension)  Shortness of breath  Pneumonia  SHONA (obstructive sleep apnea)  Acute systolic (congestive) heart failure  Community acquired pneumonia  Pulmonary edema, acute  Acute hypoxemic respiratory failure  Metabolic alkalosis  Anemia  Pulmonary hypertension  Nutrition, metabolism, and development symptoms  HTN (hypertension)  Hyponatremia  Pulmonary edema  Sepsis  Respiratory failure with hypoxia  No significant past surgical history  No significant past surgical history  SOB  SHONA (obstructive sleep apnea)  Hypoxia  SysAdmin_VisitLink          Current Meds:  cefTRIAXone   IVPB 1000 milliGRAM(s) IV Intermittent every 24 hours  dextrose 40% Gel 15 Gram(s) Oral once PRN  dextrose 5%. 1000 milliLiter(s) IV Continuous <Continuous>  dextrose 50% Injectable 12.5 Gram(s) IV Push once  dextrose 50% Injectable 25 Gram(s) IV Push once  dextrose 50% Injectable 25 Gram(s) IV Push once  furosemide   Injectable 40 milliGRAM(s) IV Push every 12 hours  glucagon  Injectable 1 milliGRAM(s) IntraMuscular once PRN  insulin lispro (HumaLOG) corrective regimen sliding scale   SubCutaneous Before meals and at bedtime  iron sucrose IVPB 200 milliGRAM(s) IV Intermittent <User Schedule>  metoprolol succinate ER 25 milliGRAM(s) Oral every 12 hours  sacubitril 24 mG/valsartan 26 mG 1 Tablet(s) Oral every 12 hours      Allergies:  No Known Allergies      ROS:  Denies the following except as indicated.    General: weight loss/weight gain, decreased appetite, fatigue  Eyes: Blurry vision, double vision, visual changes  ENT: Throat pain, changes in voice,   CV: palpitations, SOB, CP, cough  GI: NVD, difficulty swallowing, abdominal pain  : polyuria, dysuria  Endo: abnormal menses, temperature intolerance, decreased libido  MSK: weakness, joint pain  Skin: rash, dryness, diaphoresis  Heme: Easy bruising, bleeding  Neuro: HA, dizziness, lightheadedness, numbness/ tingling  Psych: Anxiety, Depression    Vital Signs Last 24 Hrs  T(C): 36.5 (02 Sep 2020 10:06), Max: 36.9 (01 Sep 2020 19:07)  T(F): 97.7 (02 Sep 2020 10:06), Max: 98.5 (01 Sep 2020 19:07)  HR: 72 (02 Sep 2020 08:45) (67 - 87)  BP: 108/59 (02 Sep 2020 08:45) (107/59 - 128/66)  BP(mean): 95 (01 Sep 2020 18:50) (95 - 95)  RR: 19 (02 Sep 2020 08:45) (16 - 20)  SpO2: 95% (02 Sep 2020 08:45) (86% - 100%)  Height (cm): 172.72 (08-28 @ 13:25)  Weight (kg): 149.7 (08-28 @ 13:25)  BMI (kg/m2): 50.2 (08-28 @ 13:25)      Constitutional: wn/wd in NAD.   HEENT: NCAT, MMM, OP clear, EOMI, , no proptosis or lid retraction  Neck: no thyromegaly or palpable thyroid nodules   Respiratory: lungs CTAB.  Cardiovascular: regular rhythm, normal S1 and S2, no audible murmurs, no peripheral edema  GI: soft, NT/ND, no masses/HSM appreciated.  Neurology: no tremors, DTR 2+  Skin: no visible rashes/lesions. no acanthosis nigricans. no hyperlipotrophy. no cushing's stigmata.  Psychiatric: AAO x 3, normal affect/mood.  Ext: radial pulses intact, DP pulses intact, extremities warm, no cyanosis, clubbing or edema.       LABS:                        9.8    10.77 )-----------( 408      ( 02 Sep 2020 07:41 )             35.8     09-02    144  |  100  |  19  ----------------------------<  120<H>  4.1   |  32<H>  |  1.10    Ca    9.2      02 Sep 2020 07:41  Phos  3.8     09-01  Mg     2.3     09-02    TPro  7.0  /  Alb  3.3  /  TBili  0.2  /  DBili  x   /  AST  18  /  ALT  35  /  AlkPhos  63  09-01          Thyroid Stimulating Hormone, Serum: 0.499 (09-01 @ 05:50)      RADIOLOGY & ADDITIONAL STUDIES:  CAPILLARY BLOOD GLUCOSE      POCT Blood Glucose.: 126 mg/dL (02 Sep 2020 07:04)  POCT Blood Glucose.: 115 mg/dL (01 Sep 2020 21:33)  POCT Blood Glucose.: 143 mg/dL (01 Sep 2020 17:28)  POCT Blood Glucose.: 116 mg/dL (01 Sep 2020 11:28)      Will discuss in rounds  A/P:49yo man and never smoker w/ PMH systemic HTN admitted for acute hypoxemic respiratory failure in the setting of acute decompensated systolic CHF including right heart failure and severe pulmonary HTN. Pulmonary consulted for management and workup recommendations for pulmonary HTN, also with newly diagnosed type 2 diabetes mellitus with A1C 6.5%.    1.  DM -   A1C:  Weight:  Cr/GRF:  ER:    Please continue lantus       units at night / morning.  Please continue lispro      units before each meal.  Please continue lispro moderate / low dose sliding scale four times daily with meals and at bedtime    Pt's fingerstick glucose goal is     Will continue to monitor     For discharge, pt can continue    Pt can follow up at discharge with [facility] by calling  to make an appointment.   Will discuss case with     and update primary team HPI: 49 y/o M with hx of HTN and chronic LE edema , and cardiac murmur (since childhood), that presents from his outpatient PCP office for hypoxia and SOB.   In the ED T: 100.1 Rectal HR: 101-> 95 BP: 152/95 RR: 22 Saturating 79% on RA and 96% on NRB . Labs remarkable for WBC 19.73, Hgb 10.6, Plt 431, Na 129, Cl 87, CRP 11.88,  Procalcitonin .12, Trop .03, proBNP > 2000, . VBG demonstrating CXR demonstrating bilateral pulmonary infiltrate/edema. Patient underwent echo showing reduced EF 30-35% and global hypokinesis and pulmHTN without valvular disease.   Admitted for acute hypoxemic respiratory failure in the setting of acute decompensated systolic CHF including right heart failure and severe pulmonary HTN. Also being treated for CAP and found to have BRUNA. Patient has been diuresing with IV lasix, and started on toprol and entresto. Plan for cardiac cath tomorrow.    Endocrine consulted for A1C 6.5%. Patient was told he was prediabetic approx 2-3 years ago and made lifestyle changes. He has a family history of diabetes. Weight has been stable. BMI 50. Diet: Breakfast-mckeon and eggs or cereal (raisin bran, total or cap'n crunch). Lunch- sandwich. Dinner-fish and vegetable. Drinks "natural" juice every day. Like "[employer]. Works in sales.    FS/1: pre breakfast 121, prelunch 116, predinner 143, bedtime 115.  : prebreakfast 126-ate eggs, mckeon, fruit cup, pre lunch 121-ate BLT and fruit plate      FH:  DM: mom and dad  Thyroid: mom  Autoimmune: denies  Other: stroke - dad    SH: Lives alone, doesn't smoke, drinks alcohol socially, no recreational drug use.      PMH & Surgical Hx:SOB  Handoff  MEWS Score  HTN (hypertension)  HTN (hypertension)  Shortness of breath  Pneumonia  SHONA (obstructive sleep apnea)  Acute systolic (congestive) heart failure  Community acquired pneumonia  Pulmonary edema, acute  Acute hypoxemic respiratory failure  Metabolic alkalosis  Anemia  Pulmonary hypertension  Nutrition, metabolism, and development symptoms  HTN (hypertension)  Hyponatremia  Pulmonary edema  Sepsis  Respiratory failure with hypoxia  No significant past surgical history  No significant past surgical history  SOB  SHONA (obstructive sleep apnea)  Hypoxia  SysAdmin_VisitLink          Current Meds:  cefTRIAXone   IVPB 1000 milliGRAM(s) IV Intermittent every 24 hours  dextrose 40% Gel 15 Gram(s) Oral once PRN  dextrose 5%. 1000 milliLiter(s) IV Continuous <Continuous>  dextrose 50% Injectable 12.5 Gram(s) IV Push once  dextrose 50% Injectable 25 Gram(s) IV Push once  dextrose 50% Injectable 25 Gram(s) IV Push once  furosemide   Injectable 40 milliGRAM(s) IV Push every 12 hours  glucagon  Injectable 1 milliGRAM(s) IntraMuscular once PRN  insulin lispro (HumaLOG) corrective regimen sliding scale   SubCutaneous Before meals and at bedtime  iron sucrose IVPB 200 milliGRAM(s) IV Intermittent <User Schedule>  metoprolol succinate ER 25 milliGRAM(s) Oral every 12 hours  sacubitril 24 mG/valsartan 26 mG 1 Tablet(s) Oral every 12 hours      Allergies:  No Known Allergies      ROS:  Denies the following except as indicated.    General: weight loss/weight gain, decreased appetite, fatigue  Eyes: Blurry vision, double vision, visual changes  ENT: Throat pain, changes in voice,   CV: palpitations, SOB, CP, cough  GI: NVD, difficulty swallowing, abdominal pain  : polyuria, dysuria  Endo:, temperature intolerance, decreased libido  MSK: weakness, joint pain  Skin: rash, dryness, diaphoresis  Heme: Easy bruising, bleeding  Neuro: HA, dizziness, lightheadedness, numbness/ tingling  Psych: Anxiety, Depression    Vital Signs Last 24 Hrs  T(C): 36.5 (02 Sep 2020 10:06), Max: 36.9 (01 Sep 2020 19:07)  T(F): 97.7 (02 Sep 2020 10:06), Max: 98.5 (01 Sep 2020 19:07)  HR: 72 (02 Sep 2020 08:45) (67 - 87)  BP: 108/59 (02 Sep 2020 08:45) (107/59 - 128/66)  BP(mean): 95 (01 Sep 2020 18:50) (95 - 95)  RR: 19 (02 Sep 2020 08:45) (16 - 20)  SpO2: 95% (02 Sep 2020 08:45) (86% - 100%)  Height (cm): 172.72 ( @ 13:25)  Weight (kg): 149.7 ( @ 13:25)  BMI (kg/m2): 50.2 ( @ 13:25)      Constitutional: wn/wd in NAD. obese.  HEENT: NCAT, MMM, OP clear, EOMI, , no proptosis or lid retraction  Neck: no thyromegaly or palpable thyroid nodules   Respiratory: lungs CTAB.  Cardiovascular: regular rhythm, normal S1 and S2, no audible murmurs, no peripheral edema  GI: soft, NT/ND, no masses/HSM appreciated.  Neurology: no tremors, DTR 2+  Skin: no visible rashes/lesions. no acanthosis nigricans. no hyperlipotrophy. no cushing's stigmata.  Psychiatric: AAO x 3, normal affect/mood.  Ext: radial pulses intact, DP pulses intact, extremities warm, no cyanosis, clubbing or edema.       LABS:                        9.8    10.77 )-----------( 408      ( 02 Sep 2020 07:41 )             35.8         144  |  100  |  19  ----------------------------<  120<H>  4.1   |  32<H>  |  1.10    Ca    9.2      02 Sep 2020 07:41  Phos  3.8       Mg     2.3         TPro  7.0  /  Alb  3.3  /  TBili  0.2  /  DBili  x   /  AST  18  /  ALT  35  /  AlkPhos  63            Thyroid Stimulating Hormone, Serum: 0.499 ( @ 05:50)      RADIOLOGY & ADDITIONAL STUDIES:  CAPILLARY BLOOD GLUCOSE      POCT Blood Glucose.: 126 mg/dL (02 Sep 2020 07:04)  POCT Blood Glucose.: 115 mg/dL (01 Sep 2020 21:33)  POCT Blood Glucose.: 143 mg/dL (01 Sep 2020 17:28)  POCT Blood Glucose.: 116 mg/dL (01 Sep 2020 11:28)      A/P:51yo man and never smoker w/ PMH systemic HTN admitted for acute hypoxemic respiratory failure in the setting of acute decompensated systolic CHF including right heart failure and severe pulmonary HTN. Pulmonary consulted for management and workup recommendations for pulmonary HTN, also with newly diagnosed type 2 diabetes mellitus with A1C 6.5%.      1.  DM - borderline type 2  A1C: 6.5%  Weight: 150kg, BMI 50  Cr/GRF: GFR 89  ER: 30-35%    Please continue lispro MISS before meals and at bedtime.  Pt's fingerstick glucose goal is 100-180.    Will continue to monitor     For discharge, diet modifications and f/u at endocrine clinic in 2-4 weeks for SMBG and nutrition education. Not a candidate for metformin due to CHF.    Pt can follow up at discharge with [facility] by calling  to make an appointment.   Will discuss case with Dr. Coy   and update primary team

## 2020-09-02 NOTE — PROGRESS NOTE ADULT - SUBJECTIVE AND OBJECTIVE BOX
Interventional Cardiology PA Adult Progress Note    Subjective Assessment: pt seen and examined in chair this am. No complaints at this time. Pt endorses breathing much better than yesterday. pt satting 94% on RA at rest. Denies CP, N/V/D.    ROS negative except for subjective and HPI  	  MEDICATIONS:  furosemide   Injectable 40 milliGRAM(s) IV Push every 12 hours  metoprolol succinate ER 25 milliGRAM(s) Oral every 12 hours  sacubitril 24 mG/valsartan 26 mG 1 Tablet(s) Oral every 12 hours    cefTRIAXone   IVPB 1000 milliGRAM(s) IV Intermittent every 24 hours          dextrose 40% Gel 15 Gram(s) Oral once PRN  dextrose 50% Injectable 12.5 Gram(s) IV Push once  dextrose 50% Injectable 25 Gram(s) IV Push once  dextrose 50% Injectable 25 Gram(s) IV Push once  glucagon  Injectable 1 milliGRAM(s) IntraMuscular once PRN  insulin lispro (HumaLOG) corrective regimen sliding scale   SubCutaneous Before meals and at bedtime    dextrose 5%. 1000 milliLiter(s) IV Continuous <Continuous>  enoxaparin Injectable 40 milliGRAM(s) SubCutaneous every 12 hours  iron sucrose IVPB 200 milliGRAM(s) IV Intermittent <User Schedule>      	    [PHYSICAL EXAM:  TELEMETRY:  T(C): 36.5 (09-02-20 @ 10:06), Max: 36.9 (09-01-20 @ 19:07)  HR: 72 (09-02-20 @ 08:45) (67 - 87)  BP: 108/59 (09-02-20 @ 08:45) (107/59 - 128/66)  RR: 19 (09-02-20 @ 08:45) (16 - 20)  SpO2: 95% (09-02-20 @ 08:45) (86% - 100%)  Wt(kg): --  I&O's Summary    01 Sep 2020 07:01  -  02 Sep 2020 07:00  --------------------------------------------------------  IN: 330 mL / OUT: 3225 mL / NET: -2895 mL    02 Sep 2020 07:01  -  02 Sep 2020 10:32  --------------------------------------------------------  IN: 240 mL / OUT: 0 mL / NET: 240 mL        Garcia:  Central/PICC/Mid Line:                                         Appearance: Normal	  HEENT:   Normal oral mucosa, PERRL, EOMI	  Neck: Supple,  - JVD  Cardiovascular: Normal S1 S2, No JVD, No murmurs   Respiratory: Decreased Breath Sounds b/l, No Rales, Rhonchi, Wheezing	  Gastrointestinal:  Soft, Non-tender, + BS	  Skin: No rashes, No ecchymoses, No cyanosis  Extremities: Normal range of motion, No clubbing, cyanosis, +2 B/L edema to knees with ace wraps in place   Vascular: Peripheral pulses palpable 2+ bilaterally  Neurologic: Non-focal  Psychiatry: A & O x 3, Mood & affect appropriate      	    ECG:  	  RADIOLOGY:   DIAGNOSTIC TESTING:  [ ] Echocardiogram:  [ ]  Catheterization:  [ ] Stress Test:    [ ] BIPIN  OTHER: 	    LABS:	 	  CARDIAC MARKERS:                                  9.8    10.77 )-----------( 408      ( 02 Sep 2020 07:41 )             35.8     09-02    144  |  100  |  19  ----------------------------<  120<H>  4.1   |  32<H>  |  1.10    Ca    9.2      02 Sep 2020 07:41  Phos  3.8     09-01  Mg     2.3     09-02    TPro  7.0  /  Alb  3.3  /  TBili  0.2  /  DBili  x   /  AST  18  /  ALT  35  /  AlkPhos  63  09-01    proBNP:   Lipid Profile:   HgA1c:   TSH:       ASSESSMENT/PLAN: 	        DVT ppx:  Dispo:

## 2020-09-02 NOTE — PROGRESS NOTE ADULT - PROBLEM SELECTOR PLAN 4
Likely secondary to decompensated heart failure and has improved with diuresis. Can continue to monitor off O2. His baseline SpO2 is probably high 80s to low 90s.    Recommendations:  - would ambulate w/ O2 monitoring today to assess for exertional hypoxemia

## 2020-09-02 NOTE — PROGRESS NOTE ADULT - ATTENDING COMMENTS
Acute on chronic decompensated biventricular heart failure with improvement with diuresis, and now with resolving metabolic alkalosis after diamox. If will not adjust lasix dose would administer one more diamox dose today. Pulmonary hypertension is secondary to left sided heart disease as well as SHONA/OHS. Needs to be ruled out for SHONA/OHS with outpatient attended PSG with TCO2 monitoring; high probability of OHS given baseline bicarb of >27. Needs NIV of at least 15/8, and will need this for discharge to home. Of note, his baseline O2 is most likely in the low 90s, high 80s.

## 2020-09-02 NOTE — PROGRESS NOTE ADULT - PROBLEM SELECTOR PLAN 4
Patient with history of HTN, at home takes valsartan hctz 320-25 combo  -Continue lasix 40IV BID and ENtresto 24/26mg  - Admission serum bicarb 29 which suggests baseline component of CO2 retention which he is likely compensated for.  - bicarb has increased to 40 9/1/20 in setting of diuresis over past few days which can cause worsening CO2 retention  - s/p Diamox 250 mg PO 8/31, s/p Diamox 500 mg PO x1 9/1   - room air ABG c/w compensated respiratory acidosis.  - Bicarb now downtrending to 32 9/2   - Monitor AM labs

## 2020-09-02 NOTE — PROGRESS NOTE ADULT - PROBLEM SELECTOR PLAN 3
+ 2 pitting edema, Decrease breath sounds, 98% on 2L  - Echo on 8/26/No significant valvular disease. trace TR Pulmonary artery systolic pressure is 76 mmHg.  The right ventricle is probably dilated. Right ventricular systolic function is reduced. (TAPSE) is 14.00 mm (normal >=17 mm). RV tissue Doppler S' is 16.00 cm/s (normal >10 cm/s). EF  30-35% with global hypokinesis.  - repeat echo when euvolemic   -STOPPED Lisinopril 2.5 and STARTED Entresto 24/26mg for 9/1 evening dose (36hrs after stopping lisinopril)  -CONTINUE Toprol 25mg BID   -CONTINUE Lasix 40mg IV BID  -ischemic work up when euvolemic  -strict I&O daily weight 1L fluid restriction TOP-BANG = 7, high risk for underlying SHONA and probably with OHS component as well.   - witnessed apneas w/ concomitant desaturation while pt sleeping on tele which also supports this diagnosis. Can only be formally diagnosed by polysomnography. -Room air ABG 9/1 c/w compensated respiratory acidosis.  -will need a formal sleep study/PSG performed as an outpatient to eval for SHONA

## 2020-09-02 NOTE — PROGRESS NOTE ADULT - ATTENDING COMMENTS
See PA note written above, for details. I reviewed the PA documentation.  I have personally seen and examined this patient 9/2/20. I reviewed vitals, labs, medications, cardiac studies and additional imaging.  I agree with the PA's findings and plans as written above with the following additions/amendments:  50AAM with morbid obesity (BMI 50.2) with Essential HTN admitted with Acute Hypoxemic respiratory failure due to acute decompensated systolic CHF with reduced EF.   Patient volume status continues to improve. Titrated off oxygen. Using BIPAP at night and encouraged to use with naps.   Plan for:  Diurese with IV Lasix 40 BID  Metoprolol 25 XL daily  Entresto 24./26 BID  s/p course of Ceftriaxone for CAP  IV iron x 5D while inpatient for significant BRUNA (D2)  Will repeat TTE with Definity for better structural assessment when euvolemic  ACE wrapping lower extremities to the knees bilaterally  BIPAP at night and with naps for clinical s/sx SHONA  Pulmonology closely following for pHTN  Patient to have R/LHC with vasodilator study on 9/3  NPO pMN for cath with vasodilator study 9/3  ABBY Perez.  Cardiology Attending

## 2020-09-02 NOTE — CONSULT NOTE ADULT - ATTENDING COMMENTS
Pt seen on rounds this afternoon.  Admitted for CHF, with cause of his ventricular dysfunction still unclear.  Was told of pre-diabetes in the past, now with A1c 6.5%.  Diet at home is fairly good except for an excessive amount of fruit and fruit juice, plus occasional intake of candy.  Fingersticks in the hospital have been 115-140 on sliding scale only.  --Should continue just on sliding scale.  --Would ordinarily start on metformin, but this is ruled out by his CHF/LV dysfunction  --Diet was discussed in detail.  I think that he is motivated to make the necessary changes.  --Would like to follow as outpatient, likely start a GLP-1 to also help with weight loss
Pt with PNA and new HFrEF.  Agree to continue to diurese patient and re-check LV function when out of acute illness.
Seen in ER with HS.  Talking in sentences but tachpynic, bilateral rales, nerissa, with bilateral edema.  Labs, as above. CXR bilateral infiltrates, POCUS extensive B lines, echo decreased EF right and left with pulmonary HTN    A - hypoxic respiratory failure /pulmonary edema/ chf low ef/ sepsis of unclear sources -possible pneumonia/ obesity/obesity hypoventilation    Suggest:  diuresis, negative balance of > 2 liters  try high flow, rate increase on BIPAP despite good sats, patient can slow RR when asked  will need cpap at night  fu covid  RVP for mycoplasma /flu  continue ABX fu cultures  venous duplex  cardiology to see  possible intubation discussed with patient who does not want it, to discus course with family  to ICU
Acute on chronic systolic CHF with pulmonary HTN. Etiology for Pulmonary HTN is most likely secondary to left heart failure with possible SHONA (not diagnosed so far). Patient will need left heart and right heart catheter for etiology for systolic CHF and pulmonary HTN. Patient will need outpatient sleep study. Conitnue diuresis and management of systolic CHF at present. We will wait for right heart cath for further management.

## 2020-09-03 LAB
ANION GAP SERPL CALC-SCNC: 11 MMOL/L — SIGNIFICANT CHANGE UP (ref 5–17)
BUN SERPL-MCNC: 20 MG/DL — SIGNIFICANT CHANGE UP (ref 7–23)
CALCIUM SERPL-MCNC: 9.3 MG/DL — SIGNIFICANT CHANGE UP (ref 8.4–10.5)
CHLORIDE SERPL-SCNC: 100 MMOL/L — SIGNIFICANT CHANGE UP (ref 96–108)
CO2 SERPL-SCNC: 31 MMOL/L — SIGNIFICANT CHANGE UP (ref 22–31)
CREAT SERPL-MCNC: 1.04 MG/DL — SIGNIFICANT CHANGE UP (ref 0.5–1.3)
GLUCOSE BLDC GLUCOMTR-MCNC: 100 MG/DL — HIGH (ref 70–99)
GLUCOSE BLDC GLUCOMTR-MCNC: 114 MG/DL — HIGH (ref 70–99)
GLUCOSE BLDC GLUCOMTR-MCNC: 130 MG/DL — HIGH (ref 70–99)
GLUCOSE BLDC GLUCOMTR-MCNC: 141 MG/DL — HIGH (ref 70–99)
GLUCOSE SERPL-MCNC: 121 MG/DL — HIGH (ref 70–99)
HCT VFR BLD CALC: 37.6 % — LOW (ref 39–50)
HGB BLD-MCNC: 10.7 G/DL — LOW (ref 13–17)
INR BLD: 1.24 — HIGH (ref 0.88–1.16)
MAGNESIUM SERPL-MCNC: 2.4 MG/DL — SIGNIFICANT CHANGE UP (ref 1.6–2.6)
MCHC RBC-ENTMCNC: 20.3 PG — LOW (ref 27–34)
MCHC RBC-ENTMCNC: 28.5 GM/DL — LOW (ref 32–36)
MCV RBC AUTO: 71.5 FL — LOW (ref 80–100)
NRBC # BLD: 0 /100 WBCS — SIGNIFICANT CHANGE UP (ref 0–0)
PLATELET # BLD AUTO: 398 K/UL — SIGNIFICANT CHANGE UP (ref 150–400)
POTASSIUM SERPL-MCNC: 3.7 MMOL/L — SIGNIFICANT CHANGE UP (ref 3.5–5.3)
POTASSIUM SERPL-SCNC: 3.7 MMOL/L — SIGNIFICANT CHANGE UP (ref 3.5–5.3)
PROTHROM AB SERPL-ACNC: 14.7 SEC — HIGH (ref 10.6–13.6)
RBC # BLD: 5.26 M/UL — SIGNIFICANT CHANGE UP (ref 4.2–5.8)
RBC # FLD: 20.6 % — HIGH (ref 10.3–14.5)
SODIUM SERPL-SCNC: 142 MMOL/L — SIGNIFICANT CHANGE UP (ref 135–145)
WBC # BLD: 9.84 K/UL — SIGNIFICANT CHANGE UP (ref 3.8–10.5)
WBC # FLD AUTO: 9.84 K/UL — SIGNIFICANT CHANGE UP (ref 3.8–10.5)

## 2020-09-03 PROCEDURE — 99233 SBSQ HOSP IP/OBS HIGH 50: CPT

## 2020-09-03 PROCEDURE — 93460 R&L HRT ART/VENTRICLE ANGIO: CPT | Mod: 26

## 2020-09-03 RX ORDER — POTASSIUM CHLORIDE 20 MEQ
40 PACKET (EA) ORAL ONCE
Refills: 0 | Status: COMPLETED | OUTPATIENT
Start: 2020-09-03 | End: 2020-09-03

## 2020-09-03 RX ADMIN — Medication 40 MILLIGRAM(S): at 05:22

## 2020-09-03 RX ADMIN — Medication 40 MILLIEQUIVALENT(S): at 09:45

## 2020-09-03 RX ADMIN — SACUBITRIL AND VALSARTAN 1 TABLET(S): 24; 26 TABLET, FILM COATED ORAL at 05:23

## 2020-09-03 RX ADMIN — Medication 81 MILLIGRAM(S): at 13:14

## 2020-09-03 RX ADMIN — IRON SUCROSE 110 MILLIGRAM(S): 20 INJECTION, SOLUTION INTRAVENOUS at 13:14

## 2020-09-03 RX ADMIN — Medication 40 MILLIGRAM(S): at 15:54

## 2020-09-03 RX ADMIN — Medication 25 MILLIGRAM(S): at 07:41

## 2020-09-03 NOTE — PROGRESS NOTE ADULT - NSHPATTENDINGPLANDISCUSS_GEN_ALL_CORE
vibhaati
the patient and cardiac team
the patient and cardiac team
the patient, Pulmonary fellow and cardiac team

## 2020-09-03 NOTE — PROGRESS NOTE ADULT - ASSESSMENT
51 y/o Male with PMHx HTN, chronic LE edema, and cardiac murmur (since childhood) who presents from his outpatient PCP office for hypoxia and SOB was found to be in acute hypoxic repsiratory failure, was admitted for Acute systolic heart failure and CAP. patient intially placed on Bipap with improvement in hypoxia then transitioned to HFNC and now to NC,  Patient underwent echo showing reduced EF 30-35% and global hypokinesis and pulmHTN without valvular disease. Patient has been diuresing with IV lasix, and started on toprol and lisinopril. Patient tx to 5uris for further management of acute sCHF and plan for R+LHC with vasodilator study today

## 2020-09-03 NOTE — PROGRESS NOTE ADULT - PROBLEM SELECTOR PLAN 5
RESOLVED Currently satting 97% on RA   - CXR 8/31: b/l opacities that are resolving since admission  - CT Angio 8/28: suggestive of pulmonary edema  - weaned to 2L NC  - Place on BiPAP overnight  -see below

## 2020-09-03 NOTE — PROGRESS NOTE ADULT - PROBLEM SELECTOR PLAN 1
+ 2 pitting edema L, 1+ R, Decrease breath sounds, satting 97% on RA   -Echo on 8/26/No significant valvular disease. trace TR Pulmonary artery systolic pressure is 76 mmHg. RV probably dilated. RVSF reduced. (TAPSE) is 14.00 mm (normal >=17 mm). RV tissue Doppler S' is 16.00 cm/s (normal >10 cm/s). EF  30-35% with global hypokinesis.  - repeat echo when euvolemic   -STOPPED Lisinopril 2.5 and STARTED Entresto 24/26mg on 9/1 PM  -CONTINUE Toprol 25mg BID, Lasix 40mg IV BID  -R+ LHC with vasodilator (NO) testing 9/3/2020:   - c/w asa 81mg daily  -strict I&O daily weight 1L fluid restriction. + 2 pitting edema L, 1+ R, Decrease breath sounds, satting 97% on RA   -Echo on 8/26/No significant valvular disease. trace TR Pulmonary artery systolic pressure is 76 mmHg. RV probably dilated. RVSF reduced. (TAPSE) is 14.00 mm (normal >=17 mm). RV tissue Doppler S' is 16.00 cm/s (normal >10 cm/s). EF  30-35% with global hypokinesis.  - repeat echo when euvolemic   -STOPPED Lisinopril 2.5 and STARTED Entresto 24/26mg on 9/1 PM  -CONTINUE Toprol 25mg BID, Lasix 40mg IV BID  -R+ LHC with vasodilator (NO) testing 9/3/2020:   -Pt ambulating without O2 desaturation  - c/w asa 81mg daily  -strict I&O daily weight 1L fluid restriction.

## 2020-09-03 NOTE — PROGRESS NOTE ADULT - PROBLEM SELECTOR PLAN 3
TOP-BANG = 7, high risk for underlying SHONA and probably with OHS component as well.   - witnessed apneas w/ concomitant desaturation while pt sleeping on tele which also supports this diagnosis. Can only be formally diagnosed by polysomnography. -Room air ABG 9/1 c/w compensated respiratory acidosis.  -will need a formal sleep study/PSG performed as an outpatient to eval for SHONA  -pt approved for bipap on dc per SW

## 2020-09-03 NOTE — PROGRESS NOTE ADULT - SUBJECTIVE AND OBJECTIVE BOX
Interventional Cardiology PA Adult Progress Note    Subjective Assessment:  Pt seen and examined at bedside this am. Denies SOB, orthopnea, SUE. Admits to improvement in LE edema. Denies CP, palpitations, lighthededness.     ROS Negative except as per Subjective and HPI  	  MEDICATIONS:  furosemide   Injectable 40 milliGRAM(s) IV Push every 12 hours  metoprolol succinate ER 25 milliGRAM(s) Oral every 12 hours  sacubitril 24 mG/valsartan 26 mG 1 Tablet(s) Oral every 12 hours  dextrose 40% Gel 15 Gram(s) Oral once PRN  dextrose 50% Injectable 12.5 Gram(s) IV Push once  dextrose 50% Injectable 25 Gram(s) IV Push once  dextrose 50% Injectable 25 Gram(s) IV Push once  glucagon  Injectable 1 milliGRAM(s) IntraMuscular once PRN  insulin lispro (HumaLOG) corrective regimen sliding scale   SubCutaneous Before meals and at bedtime  aspirin enteric coated 81 milliGRAM(s) Oral daily  dextrose 5%. 1000 milliLiter(s) IV Continuous <Continuous>  iron sucrose IVPB 200 milliGRAM(s) IV Intermittent <User Schedule>	    [PHYSICAL EXAM:  TELEMETRY:  T(C): 36.2 (09-03-20 @ 10:15), Max: 37.5 (09-02-20 @ 21:37)  HR: 72 (09-03-20 @ 09:16) (66 - 82)  BP: 107/55 (09-03-20 @ 09:01) (107/55 - 119/74)  RR: 16 (09-03-20 @ 09:16) (16 - 25)  SpO2: 99% (09-03-20 @ 09:16) (92% - 100%)  Wt(kg): --  I&O's Summary    02 Sep 2020 07:01  -  03 Sep 2020 07:00  --------------------------------------------------------  IN: 390 mL / OUT: 1550 mL / NET: -1160 mL    03 Sep 2020 07:01  -  03 Sep 2020 12:50  --------------------------------------------------------  IN: 0 mL / OUT: 300 mL / NET: -300 mL        Weight (kg): 148 (09-03 @ 06:54)  Garcia:  Central/PICC/Mid Line:                                         Appearance: Normal	  HEENT:   Normal oral mucosa, EOMI	  Neck: Supple, - JVD  Cardiovascular: Normal S1 S2, No JVD, No murmurs  Respiratory: Lungs clear to auscultation with Decreased Breath Sounds b/l. No Rales, Rhonchi, Wheezing	  Extremities: Normal range of motion, No clubbing, cyanosid/ Pitting edema: 2+ L and 1+ R  Neurologic: Non-focal  Psychiatry: A & O x 3, Mood & affect appropriate    	    ECG:  	  RADIOLOGY:   DIAGNOSTIC TESTING:  [ ] Echocardiogram:  [ ]  Catheterization:  [ ] Stress Test:    [ ] BIPIN  OTHER: 	    LABS:	 	  CARDIAC MARKERS:                                  10.7   9.84  )-----------( 398      ( 03 Sep 2020 07:24 )             37.6     09-03    142  |  100  |  20  ----------------------------<  121<H>  3.7   |  31  |  1.04    Ca    9.3      03 Sep 2020 07:24  Mg     2.4     09-03      proBNP:   Lipid Profile:   HgA1c:   TSH:   PT/INR - ( 03 Sep 2020 10:23 )   PT: 14.7 sec;   INR: 1.24              ASSESSMENT/PLAN: 	        DVT ppx:  Dispo:

## 2020-09-03 NOTE — PROGRESS NOTE ADULT - PROBLEM SELECTOR PLAN 8
Patient found to have low Hgb 10.0 on admission without any signs of bleeding.   - Hgb improving to 10.7 9/3  - iron studies c/w severe BRUNA and receiving IV iron x 5 doses   - Continue to monitor

## 2020-09-03 NOTE — PROGRESS NOTE ADULT - PROBLEM SELECTOR PLAN 4
Admission serum bicarb 29 which suggests baseline component of CO2 retention which he is likely compensated for.  - bicarb has increased to 40 9/1/20 in setting of diuresis over past few days which can cause worsening CO2 retention  - s/p Diamox 250 mg PO 8/31, s/p Diamox 500 mg PO x1 9/1   - room air ABG c/w compensated respiratory acidosis.  - Bicarb now downtrending to 32 9/2   - Monitor AM labs Admission serum bicarb 29 which suggests baseline component of CO2 retention which he is likely compensated for.  - bicarb has increased to 40 20 in setting of diuresis over past few days which can cause worsening CO2 retention  - s/p Diamox 250 mg PO , s/p Diamox 500 mg PO x1    - room air ABG c/w compensated respiratory acidosis  - CO2 imporvin/3: 31 (WNL)  - Monitor AM labs

## 2020-09-03 NOTE — PROGRESS NOTE ADULT - PROBLEM SELECTOR PLAN 2
- likely 2/2 to cardiac disease with underlying sleep apnea  - Echo as above  - pulmonology consulted/ continue to appreciate recs

## 2020-09-03 NOTE — PROGRESS NOTE ADULT - PROBLEM SELECTOR PLAN 6
initially tachypnea, tachycardia, febrile 100.1 rectally, and WBC > 19,   - Afebrile WBC WNL today, without cough  - s/p Azithromycin/Ceftriaxone  - Repeat procal 8/31: 0.07 (downtrending)  - blood cx - NGTD   - Urine cx NGTD   - UA negative  - Covid negative x3. F/u Covid antibodies   - HIV negative

## 2020-09-03 NOTE — PROGRESS NOTE ADULT - PROBLEM SELECTOR PROBLEM 6
CHIEF COMPLAINT:    Chief Complaint   Patient presents with   • Establish Care   • Physical       SUBJECTIVE:  Saida Gongora is a 17 year old female who presents for an annual exam.  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  She has concerns as she has had quite a bit of difficulty with her class load which has further led to significant anxiety and lack of sleep.  She met with a therapist who \"did several tests\" at school and was diagnosed with ADHD.  She has not used stimulant medication in the past (prescribed or from a class mate) though requests medication today.      Family history demonstrates no early detected cancers, no sudden cardiac death.  She feels she is active working as a  and plans to lose \"10-20\" pounds over the next few months with a diet and exercise plan.         Review of systems:   Constitutional: Negative for fever and chills.   Skin: Negative for rash.   HEENT: Negative for eye drainage, rhinorrhea, ear pain, sore throat.  Respiratory: Negative cough, wheezing or shortness of breath.    Cardiovascular: Negative for chest pain, chest pressure or palpitations.   Gastrointestinal: Negative for nausea, vomiting, diarrhea.   Genitourinary: Negative for dysuria, urgency, frequency or hematuria.  Extremities:  Negative for joint swelling or joint pain.  Neurologic:  Negative for change in sensory or motor function.   Endocrine: Negative for heat or cold intolerance.  Psychiatric: Negative for change in mood or mentation.         OBJECTIVE:  PROBLEM LIST:    Patient Active Problem List   Diagnosis   • Family Info       PAST HISTORIES:  I have reviewed the past medical history, family history, social history, medications and allergies listed in the medical record as obtained by my nursing staff and support staff and agree with their documentation.  Allergies, medications, medical  history, surgical history, social history and family history were reviewed and updated.    Physical Exam:    Vital Signs:    Visit Vitals  /84 (BP Location: [facility] [place], Patient Position: Sitting, Cuff Size: Regular)   Pulse 80   Temp 98.2 °F (36.8 °C) (Oral)   Ht 5' 5\" (1.651 m)   Wt 87.7 kg   LMP 12/22/2017   SpO2 99%   BMI 32.17 kg/m²     Pulse Ox Interpretation:  Within normal limits.  General:  Well-developed.  In no acute distress.       HEENT:  Normocephalic, atraumatic. Eyes- EOMI (extraocular movements are intact), no conjunctival pallor or scleral icterus.  Ears- auditory acuity grossly intact, canals patent, TMs (tympanic membranes) intact.  Nose- mucosa pink with clear secretions.  Mouth/throat- moist mucosa without lesions, tongue midline, tonsillar pillars symmetrical, dentition normal.  Neck:  Supple without masses or thyromegaly.  Trachea midline.  Lymphatics:  No cervical, axillary or inguinal adenopathy.  Chest:  Symmetrical configuration, nontender.  Breath sounds clear throughout.  No accessory muscle use.  Cardiovascular:  Regular rate and rhythm, no murmur or gallop.  Peripheral pulses intact and symmetrical in upper and lower extremities.  No carotid or abdominal bruits appreciated.  Abdomen:  Soft, nontender, bowel sounds present.  No appreciable hepatomegaly or splenomegaly.  No masses. No CVA (costovertebral angle) tenderness.  Back:  Normal configuration.     Extremities: Without deformity, clubbing, or cyanosis of upper or lower extremities.  No edema of lower extremities.  Gait normal.  Neurologic:  Alert, verbal, appropriate.  Oriented x3.  Speech fluent.  Cranial nerves II-XII intact to observation.  No apraxia or ataxia observed.            Assessment/PLAN:    1. Routine general medical examination at a health care facility  - CBC & AUTO DIFFERENTIAL; Future  - COMPREHENSIVE METABOLIC PANEL; Future    2. Combined hyperlipidemia  Was told her cholesterol was elevated in the past  - LIPID  PANEL WITH REFLEX; Future    3. Attention deficit hyperactivity disorder (ADHD), predominantly inattentive type  Will start with low dose Vyvanse, she returns to school on 1/22.  If she has no side effects will call our office and refills will be sent to her local pharmacy on campus.  Follow up will be over her spring break.  - lisdexamfetamine (VYVANSE) 20 MG capsule; Take 1 capsule by mouth every morning.  Dispense: 20 capsule; Refill: 0    4. Body mass index (BMI) of 32.0-32.9 in adult      Return in about 3 months (around 3/29/2018), or Follow up over her spring break.      Instructions provided as documented in the AVS (after visit summary).    The patient indicated understanding of the diagnosis and agreed with the plan of care.   Treatment options discussed with patient and explained in detail. The risks, benefits and potential side effects of possible medications were reviewed. Alternatives were discussed. Medication instructions and consequences of not taking the medications were discussed. Patient's understanding was assessed and patient agreed with the plan. Monitoring parameters and expected course outlined. Patient to call or come in if symptoms fail to respond as outlined or worsen in any way.      Bud Burrell MD       Community acquired pneumonia

## 2020-09-03 NOTE — PROGRESS NOTE ADULT - ATTENDING COMMENTS
See PA note written above, for details. I reviewed the PA documentation.  I have personally seen and examined this patient 9/3/20. I reviewed vitals, labs, medications, cardiac studies and additional imaging.  I agree with the PA's findings and plans as written above with the following additions/amendments:  50AAM with morbid obesity (BMI 50.2) with Essential HTN admitted with Acute Hypoxemic respiratory failure due to acute decompensated systolic CHF with reduced EF.   Patient volume status is significantly improved. Able to lay supine at zero degrees for prolonged periods of time (>40min). Titrated off nasal cannula. Continues to use BIPAP at night and with naps. ACE wraps remain on legs.  Plan for:  Continue diuresis with IV Lasix 40 BID  Metoprolol 25 XL daily  Entresto 24/26 BID  s/p course of Ceftriaxone/Azithro for CAP  IV iron x 5D while inpatient for significant BRUNA (D3), hemoglobin responding well to IV Iron therapy  Repeat TTE with Definity for better structural assessment   ACE wrapping lower extremities to the knees bilaterally  BIPAP at night and with naps for clinical s/sx SHONA  Pulmonology closely following for pHTN  NPO [facility] for right and left heart catheterization with vasodilator study today 9/3 with Dr. Henry Perez M.D.  Cardiology Attending

## 2020-09-04 LAB
ALBUMIN SERPL ELPH-MCNC: 3.5 G/DL — SIGNIFICANT CHANGE UP (ref 3.3–5)
ALP SERPL-CCNC: 76 U/L — SIGNIFICANT CHANGE UP (ref 40–120)
ALT FLD-CCNC: 34 U/L — SIGNIFICANT CHANGE UP (ref 10–45)
ANION GAP SERPL CALC-SCNC: 12 MMOL/L — SIGNIFICANT CHANGE UP (ref 5–17)
AST SERPL-CCNC: 20 U/L — SIGNIFICANT CHANGE UP (ref 10–40)
BASOPHILS # BLD AUTO: 0.08 K/UL — SIGNIFICANT CHANGE UP (ref 0–0.2)
BASOPHILS NFR BLD AUTO: 0.8 % — SIGNIFICANT CHANGE UP (ref 0–2)
BILIRUB SERPL-MCNC: 0.3 MG/DL — SIGNIFICANT CHANGE UP (ref 0.2–1.2)
BUN SERPL-MCNC: 22 MG/DL — SIGNIFICANT CHANGE UP (ref 7–23)
CALCIUM SERPL-MCNC: 9.6 MG/DL — SIGNIFICANT CHANGE UP (ref 8.4–10.5)
CHLORIDE SERPL-SCNC: 98 MMOL/L — SIGNIFICANT CHANGE UP (ref 96–108)
CO2 SERPL-SCNC: 32 MMOL/L — HIGH (ref 22–31)
CREAT SERPL-MCNC: 1.05 MG/DL — SIGNIFICANT CHANGE UP (ref 0.5–1.3)
EOSINOPHIL # BLD AUTO: 0.19 K/UL — SIGNIFICANT CHANGE UP (ref 0–0.5)
EOSINOPHIL NFR BLD AUTO: 1.9 % — SIGNIFICANT CHANGE UP (ref 0–6)
GLUCOSE BLDC GLUCOMTR-MCNC: 106 MG/DL — HIGH (ref 70–99)
GLUCOSE BLDC GLUCOMTR-MCNC: 109 MG/DL — HIGH (ref 70–99)
GLUCOSE BLDC GLUCOMTR-MCNC: 113 MG/DL — HIGH (ref 70–99)
GLUCOSE BLDC GLUCOMTR-MCNC: 117 MG/DL — HIGH (ref 70–99)
GLUCOSE SERPL-MCNC: 151 MG/DL — HIGH (ref 70–99)
HCT VFR BLD CALC: 38.7 % — LOW (ref 39–50)
HGB BLD-MCNC: 11 G/DL — LOW (ref 13–17)
IMM GRANULOCYTES NFR BLD AUTO: 0.7 % — SIGNIFICANT CHANGE UP (ref 0–1.5)
LYMPHOCYTES # BLD AUTO: 1.56 K/UL — SIGNIFICANT CHANGE UP (ref 1–3.3)
LYMPHOCYTES # BLD AUTO: 15.5 % — SIGNIFICANT CHANGE UP (ref 13–44)
MAGNESIUM SERPL-MCNC: 2.3 MG/DL — SIGNIFICANT CHANGE UP (ref 1.6–2.6)
MCHC RBC-ENTMCNC: 20.1 PG — LOW (ref 27–34)
MCHC RBC-ENTMCNC: 28.4 GM/DL — LOW (ref 32–36)
MCV RBC AUTO: 70.7 FL — LOW (ref 80–100)
MONOCYTES # BLD AUTO: 0.74 K/UL — SIGNIFICANT CHANGE UP (ref 0–0.9)
MONOCYTES NFR BLD AUTO: 7.3 % — SIGNIFICANT CHANGE UP (ref 2–14)
NEUTROPHILS # BLD AUTO: 7.43 K/UL — HIGH (ref 1.8–7.4)
NEUTROPHILS NFR BLD AUTO: 73.8 % — SIGNIFICANT CHANGE UP (ref 43–77)
NRBC # BLD: 0 /100 WBCS — SIGNIFICANT CHANGE UP (ref 0–0)
PLATELET # BLD AUTO: 400 K/UL — SIGNIFICANT CHANGE UP (ref 150–400)
POTASSIUM SERPL-MCNC: 4.1 MMOL/L — SIGNIFICANT CHANGE UP (ref 3.5–5.3)
POTASSIUM SERPL-SCNC: 4.1 MMOL/L — SIGNIFICANT CHANGE UP (ref 3.5–5.3)
PROT SERPL-MCNC: 8.2 G/DL — SIGNIFICANT CHANGE UP (ref 6–8.3)
RBC # BLD: 5.47 M/UL — SIGNIFICANT CHANGE UP (ref 4.2–5.8)
RBC # FLD: 21.4 % — HIGH (ref 10.3–14.5)
SODIUM SERPL-SCNC: 142 MMOL/L — SIGNIFICANT CHANGE UP (ref 135–145)
WBC # BLD: 10.07 K/UL — SIGNIFICANT CHANGE UP (ref 3.8–10.5)
WBC # FLD AUTO: 10.07 K/UL — SIGNIFICANT CHANGE UP (ref 3.8–10.5)

## 2020-09-04 PROCEDURE — 93306 TTE W/DOPPLER COMPLETE: CPT | Mod: 26

## 2020-09-04 PROCEDURE — 99233 SBSQ HOSP IP/OBS HIGH 50: CPT | Mod: GC

## 2020-09-04 RX ADMIN — Medication 25 MILLIGRAM(S): at 00:29

## 2020-09-04 RX ADMIN — IRON SUCROSE 110 MILLIGRAM(S): 20 INJECTION, SOLUTION INTRAVENOUS at 11:42

## 2020-09-04 RX ADMIN — SACUBITRIL AND VALSARTAN 1 TABLET(S): 24; 26 TABLET, FILM COATED ORAL at 07:29

## 2020-09-04 RX ADMIN — SACUBITRIL AND VALSARTAN 1 TABLET(S): 24; 26 TABLET, FILM COATED ORAL at 18:04

## 2020-09-04 RX ADMIN — Medication 40 MILLIGRAM(S): at 05:39

## 2020-09-04 RX ADMIN — SACUBITRIL AND VALSARTAN 1 TABLET(S): 24; 26 TABLET, FILM COATED ORAL at 00:29

## 2020-09-04 RX ADMIN — Medication 25 MILLIGRAM(S): at 13:39

## 2020-09-04 RX ADMIN — Medication 81 MILLIGRAM(S): at 11:42

## 2020-09-04 NOTE — PROGRESS NOTE ADULT - PROBLEM SELECTOR PLAN 1
Found on TTE to have estimated PASP 76mmHg in the setting of decompensated systolic CHF and right heart failure, all of which are new diagnoses for him. 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     Now status post L+RHC 9/3 with the following values:   RHC #1 Room air: PA 61/22mmHg (mean 39), PCWP 18mmHg, PA sat 65%, Ficks CI 3.3, CO 8.5 L/min  RHC #3 Nitric oxide 40 ppm: PA 48/13mmHg (mean 31), PCWP 20mmHg, PA sat 71%, Ficks CI 3.1, CO 7.9L/min.     TP  DP    RHC testing suggests significant vasodilator response from NO, and his TPG/DPG and wedge pressures suggest principal post-capillary component of PHTN.    Recommendations:  - will need a formal sleep study/PSG performed as an outpatient to eval for SHONA  - s/p L+RHC 9/3 with vasodilator response and principal post-capillary component  - would benefit from close pulmonary and cardiology follow-up as an outpatient, for workup of SHONA, continued care of his CHF, and for surveillance of PH Found on TTE to have estimated PASP 76mmHg in the setting of decompensated systolic CHF and right heart failure, all of which are new diagnoses for him. 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     Now status post L+RHC 9/3 with the following values:   RHC #1 Room air: PA 61/22mmHg (mean 39), PCWP 18mmHg, PA sat 65%, Ficks CI 3.3, CO 8.5 L/min  RHC #3 Nitric oxide 40 ppm: PA 48/13mmHg (mean 31), PCWP 20mmHg, PA sat 71%, Ficks CI 3.1, CO 7.9L/min.     TP  DP    RHC testing suggests significant vasodilator response from NO, and his TPG/DPG and wedge pressures suggest principal post-capillary component of PHTN.    Recommendations:  - will need a formal sleep study/PSG performed as an outpatient to eval for SHONA  - s/p L+RHC 9/3 with vasodilator response and principal post-capillary component  - would benefit from close pulmonary and cardiology follow-up as an outpatient, for workup of SHONA, continued care of his CHF, and for surveillance of PH    For discharge:  - patient should follow up with Dr. Desiree Herbert ([phone]) within 2-4wks of discharge  - he will require home BiPAP machine set to 15/8, please ensure he is discharged with this equipment

## 2020-09-04 NOTE — PROGRESS NOTE ADULT - PROBLEM SELECTOR PLAN 9
AIC 6.5 with -120's  -endo consulted for new diabetes diagnosis  -ISS/FS     VTE: subcut heparin held for cath  Dispo: 5uris
AIC 6.5 with -120's  -endo consulted for new diabetes diagnosis  -ISS/FS     VTE: subcut heparin held for cath  Dispo: Pending diuresis and plan for R+LHC with NO testing
F: None  E: Replete K<4. Mg<2  N: DASH/TLC diet     DVT PPx: Lovenox  [facility]  CODE: FULL CODE
AIC 6.5 with -120's  -endo consulted for new diabetes diagnosis  -ISS/FS     VTE: subcut heparin   Dispo: Pending diuresis and plan for R+LHC with NO tomorrow, NPO after midnight

## 2020-09-04 NOTE — PROGRESS NOTE ADULT - PROBLEM SELECTOR PLAN 3
STOP-BANG = 7, high risk for underlying SHONA and probably with OHS component as well. Per primary team have witnessed apneas w/ concomitant desaturation while pt sleeping on telemetry monitor which also supports this diagnosis. Can only be formally diagnosed by polysomnography however. Room air ABG 9/1 consistent with compensated respiratory acidosis.     Recommendations:  - as discussed above, will need formal sleep study done as outpatient   - would continue BiPAP at night with minimum IPAP of 15, and EPAP of 8 (15/8); supplemental O2/FiO2 on BiPAP as needed STOP-BANG = 7, high risk for underlying SHONA and probably with OHS component as well. Per primary team have witnessed apneas w/ concomitant desaturation while pt sleeping on telemetry monitor which also supports this diagnosis. Can only be formally diagnosed by polysomnography however. Room air ABG 9/1 consistent with compensated respiratory acidosis.     Recommendations:  - as discussed above, will need formal sleep study done as outpatient   - would continue BiPAP at night with minimum IPAP of 15, and EPAP of 8 (15/8); supplemental O2/FiO2 on BiPAP as needed    For discharge:  - patient should follow up with Dr. Desiree Herbert ([phone]) within 2-4wks of discharge  - he will require home BiPAP machine set to 15/8, please ensure he is discharged with this equipment

## 2020-09-04 NOTE — PROGRESS NOTE ADULT - SUBJECTIVE AND OBJECTIVE BOX
Interventional Cardiology PA Adult Progress Note    Subjective Assessment:  Pt seen and examined at bedside this am. R radial access and R AC without hematoma, bleeding, ecchymosis. 2+ distal pulse. Pt denies SOB, orthopnea, cough. Admits to legs seemingly with less edema. Denies chest pain, palpitations, lightheadedness.    ROS Negative except as per Subjective and HPI  	  MEDICATIONS:  metoprolol succinate ER 25 milliGRAM(s) Oral every 12 hours  sacubitril 24 mG/valsartan 26 mG 1 Tablet(s) Oral every 12 hours  dextrose 40% Gel 15 Gram(s) Oral once PRN  dextrose 50% Injectable 12.5 Gram(s) IV Push once  dextrose 50% Injectable 25 Gram(s) IV Push once  dextrose 50% Injectable 25 Gram(s) IV Push once  glucagon  Injectable 1 milliGRAM(s) IntraMuscular once PRN  insulin lispro (HumaLOG) corrective regimen sliding scale   SubCutaneous Before meals and at bedtime  aspirin enteric coated 81 milliGRAM(s) Oral daily  dextrose 5%. 1000 milliLiter(s) IV Continuous <Continuous>  iron sucrose IVPB 200 milliGRAM(s) IV Intermittent <User Schedule>  	    [PHYSICAL EXAM:  TELEMETRY:  T(C): 36.8 (09-04-20 @ 18:12), Max: 37.2 (09-04-20 @ 07:00)  HR: 83 (09-04-20 @ 17:55) (76 - 89)  BP: 122/64 (09-04-20 @ 17:55) (94/51 - 124/62)  RR: 19 (09-04-20 @ 17:31) (18 - 19)  SpO2: 92% (09-04-20 @ 17:55) (92% - 97%)  Wt(kg): --  I&O's Summary    03 Sep 2020 07:01  -  04 Sep 2020 07:00  --------------------------------------------------------  IN: 0 mL / OUT: 1300 mL / NET: -1300 mL    04 Sep 2020 07:01  -  04 Sep 2020 18:29  --------------------------------------------------------  IN: 900 mL / OUT: 850 mL / NET: 50 mL        Garcia:  Central/PICC/Mid Line:                                         Appearance: Normal	  HEENT:   Normal oral mucosa, PERRL, EOMI	  Neck: Supple, - JVD  Cardiovascular: Normal S1 S2, No JVD, No murmurs,   Respiratory: Decreased Breath Sounds/No Rales, Rhonchi, Wheezing	  Gastrointestinal:  Soft, Non-tender, + BS	  Skin: No rashes, No ecchymoses, No cyanosis  Extremities: 1+ pitting edema b/l  Neurologic: Non-focal  Psychiatry: A & O x 3, Mood & affect appropriate      	    ECG:  	  RADIOLOGY:   DIAGNOSTIC TESTING:  [ ] Echocardiogram:  [ ]  Catheterization:  [ ] Stress Test:    [ ] BIPIN  OTHER: 	    LABS:	 	  CARDIAC MARKERS:                                  11.0   10.07 )-----------( 400      ( 04 Sep 2020 08:22 )             38.7     09-04    142  |  98  |  22  ----------------------------<  151<H>  4.1   |  32<H>  |  1.05    Ca    9.6      04 Sep 2020 08:22  Mg     2.3     09-04    TPro  8.2  /  Alb  3.5  /  TBili  0.3  /  DBili  x   /  AST  20  /  ALT  34  /  AlkPhos  76  09-04    proBNP:   Lipid Profile:   HgA1c:   TSH:   PT/INR - ( 03 Sep 2020 10:23 )   PT: 14.7 sec;   INR: 1.24              ASSESSMENT/PLAN: 	        DVT ppx:  Dispo:

## 2020-09-04 NOTE — PROGRESS NOTE ADULT - PROBLEM SELECTOR PLAN 4
Admission serum bicarb 29 which suggests baseline component of CO2 retention which he is likely compensated for.  - bicarb has increased to 40 20 in setting of diuresis over past few days which can cause worsening CO2 retention  - s/p Diamox 250 mg PO , s/p Diamox 500 mg PO x1    - room air ABG c/w compensated respiratory acidosis  - CO2 imporvin/3: 31 (WNL)

## 2020-09-04 NOTE — PROGRESS NOTE ADULT - PROBLEM SELECTOR PLAN 1
1+ pitting edema b/l, Decrease breath sounds, satting 97% on RA   -Echo on 8/26/No significant valvular disease. trace TR Pulmonary artery systolic pressure is 76 mmHg. RV probably dilated. RVSF reduced. (TAPSE) is 14.00 mm (normal >=17 mm). RV tissue Doppler S' is 16.00 cm/s (normal >10 cm/s). EF  30-35% with global hypokinesis.  - repeat echo when euvolemic   -STOPPED Lisinopril 2.5 and STARTED Entresto 24/26mg on 9/1 PM  -CONTINUE Toprol 25mg BID  -STARTED torsemide 40mg daily on 9/5  -C 9/3/2020: normal LM, 30% mLAD lesion, normal LCx/RCA, EDP 24mmHg.   -RHC and Vasodilator testing: RHC #1 Room air: PA 61/22mmHg, mean 39mmHg, PCWP 18mmHg, PA sat 65%,  CI 3.3, CO 8.5 L/min.   RHC #2 100% O2: PA 58/24mmHg, mean 38mmHg, PCWP 19mmHg, PA sat 64%, CI 2.5, CO 6.4L/min.   RHC #3 Nitric oxide 40 ppm: PA 48/13mmHg, mean 31mmHg, PCWP 20mmHg, PA sat 71%, CI 3.1, CO 7.9L/min. RHC    -Pt ambulating without O2 desaturation  - c/w asa 81mg daily  -strict I&O daily weight 1L fluid restriction. 1+ pitting edema b/l, Decrease breath sounds, satting 97% on RA   -Echo on 8/26/No significant valvular disease. trace TR Pulmonary artery systolic pressure is 76 mmHg. RV probably dilated. RVSF reduced. (TAPSE) is 14.00 mm (normal >=17 mm). RV tissue Doppler S' is 16.00 cm/s (normal >10 cm/s). EF  30-35% with global hypokinesis.  - repeat echo 9/4/20: Mildly dilated left ventricular size. Mild symmetric left ventricular hypertrophy. Moderately left reduced ventricular systolic function. Aortic sclerosis without significant stenosis. No evidence of pulmonary hypertension. No pericardial effusion.   -STOPPED Lisinopril 2.5 and STARTED Entresto 24/26mg on 9/1 PM  -CONTINUE Toprol 25mg BID  -STARTED torsemide 40mg daily on 9/5  -LHC 9/3/2020: normal LM, 30% mLAD lesion, normal LCx/RCA, EDP 24mmHg.   -RHC and Vasodilator testing: RHC #1 Room air: PA 61/22mmHg, mean 39mmHg, PCWP 18mmHg, PA sat 65%,  CI 3.3, CO 8.5 L/min.   RHC #2 100% O2: PA 58/24mmHg, mean 38mmHg, PCWP 19mmHg, PA sat 64%, CI 2.5, CO 6.4L/min.   RHC #3 Nitric oxide 40 ppm: PA 48/13mmHg, mean 31mmHg, PCWP 20mmHg, PA sat 71%, CI 3.1, CO 7.9L/min. RHC    -Pt ambulating without O2 desaturation  - c/w asa 81mg daily  -strict I&O daily weight 1L fluid restriction.

## 2020-09-04 NOTE — PROGRESS NOTE ADULT - PROBLEM SELECTOR PLAN 2
- likely 2/2 to cardiac disease with underlying sleep apnea  - Echo as above  - pulmonology consulted/ continue to appreciate recs  - Pt may f/u with pulmonologist Dr. Connelly outpatient

## 2020-09-04 NOTE — PROGRESS NOTE ADULT - PROBLEM SELECTOR PROBLEM 6
October 27, 2017      Ajay Victoria  [address] [facility] [place] [facility] [place] [address]        Dear Parents of Ajay Moss's lead and hemoglobin results were normal. Below is a copy of the results.      Resulted Orders   Lead Capillary   Result Value Ref Range    Lead Result <1.9 0.0 - 4.9 ug/dL      Comment:      Not lead-poisoned.    Lead Specimen Type Capillary blood    Hemoglobin   Result Value Ref Range    Hemoglobin 12.1 10.5 - 14.0 g/dL       If you have any questions or concerns, please call the clinic at the number listed above.       Sincerely,        Rebecca Chacon MD PhD/EC CMA                 Community acquired pneumonia

## 2020-09-04 NOTE — PROGRESS NOTE ADULT - SUBJECTIVE AND OBJECTIVE BOX
PULMONARY CONSULT SERVICE FOLLOW-UP NOTE    INTERVAL HPI:  Reviewed chart and overnight events; patient seen and examined at bedside.  - No new complaints today, but had a long night from the procedure and did not get back to his room until around 2am so feels a bit tired.   - Continues to feel improvement with respect to breathing and denies dyspnea, minimal cough. Walked without oxygen yesterday without desaturation and felt well.    MEDICATIONS:  Pulmonary:    Antimicrobials:    Anticoagulants:  aspirin enteric coated 81 milliGRAM(s) Oral daily    Cardiac:  metoprolol succinate ER 25 milliGRAM(s) Oral every 12 hours  sacubitril 24 mG/valsartan 26 mG 1 Tablet(s) Oral every 12 hours    Allergies    No Known Allergies    Intolerances    Vital Signs Last 24 Hrs  T(C): 37.1 (04 Sep 2020 09:31), Max: 37.2 (04 Sep 2020 07:00)  T(F): 98.8 (04 Sep 2020 09:31), Max: 99 (04 Sep 2020 07:00)  HR: 78 (04 Sep 2020 10:25) (72 - 89)  BP: 94/51 (04 Sep 2020 10:25) (94/51 - 125/79)  BP(mean): 80 (04 Sep 2020 05:04) (80 - 96)  RR: 19 (04 Sep 2020 10:25) (16 - 19)  SpO2: 97% (04 Sep 2020 09:29) (95% - 99%)    09-03 @ 07:01 - 09-04 @ 07:00  --------------------------------------------------------  IN: 0 mL / OUT: 1300 mL / NET: -1300 mL    09-04 @ 07:01 - 09-04 @ 11:41  --------------------------------------------------------  IN: 180 mL / OUT: 500 mL / NET: -320 mL    PHYSICAL EXAM:  Constitutional: obese man resting comfortably in bed; NAD  HEENT: NC/AT; PERRL, anicteric sclera; MMM  Neck: supple  Cardiovascular: +S1/S2, RRR  Respiratory: CTA B/L with interval resolution of adventitious sounds  Gastrointestinal: obese and soft, NT/ND  Extremities: WWP; no edema, clubbing or cyanosis  Vascular: 2+ radial pulses B/L  Neurological: awake and alert; HANNAH    LABS:  CBC Full  -  ( 04 Sep 2020 08:22 )  WBC Count : 10.07 K/uL  RBC Count : 5.47 M/uL  Hemoglobin : 11.0 g/dL  Hematocrit : 38.7 %  Platelet Count - Automated : 400 K/uL  Mean Cell Volume : 70.7 fl  Mean Cell Hemoglobin : 20.1 pg  Mean Cell Hemoglobin Concentration : 28.4 gm/dL  Auto Neutrophil # : x  Auto Lymphocyte # : x  Auto Monocyte # : x  Auto Eosinophil # : x  Auto Basophil # : x  Auto Neutrophil % : x  Auto Lymphocyte % : x  Auto Monocyte % : x  Auto Eosinophil % : x  Auto Basophil % : x    09-04    142  |  98  |  22  ----------------------------<  151<H>  4.1   |  32<H>  |  1.05    Ca    9.6      04 Sep 2020 08:22  Mg     2.3     09-04    TPro  8.2  /  Alb  3.5  /  TBili  0.3  /  DBili  x   /  AST  20  /  ALT  34  /  AlkPhos  76  09-04    PT/INR - ( 03 Sep 2020 10:23 )   PT: 14.7 sec;   INR: 1.24       RADIOLOGY & ADDITIONAL STUDIES (personally reviewed):  [Cardiac cath 9/3PM]  LHC- normal LM, 30% mLAD lesion, normal LCx/RCA, EDP 24mmHg.   RHC #1 Room air: PA 61/22mmHg (mean 39), PCWP 18mmHg, PA sat 65%, Ficks CI 3.3, CO 8.5 L/min.   RHC #2 100% O2: PA 58/24mmHg (mean 38), PCWP 19mmHg, PA sat 64%, Ficks CI 2.5, CO 6.4L/min.   RHC #3 Nitric oxide 40 ppm: PA 48/13mmHg (mean 31), PCWP 20mmHg, PA sat 71%, Ficks CI 3.1, CO 7.9L/min.   --> RHC #3 Transpulmonary gradient (TPG) = 11mmHg, Diastolic pulmonary gradient (DPG) = 7mmHg

## 2020-09-04 NOTE — PROGRESS NOTE ADULT - PROBLEM SELECTOR PLAN 4
IMPROVED; was likely secondary to decompensated heart failure, improved with diuresis. Can continue to monitor off O2. Ambulatory SpO2 did not drop below 92% so not indicated for home O2 at this time. IMPROVED; was likely secondary to decompensated heart failure, improved with diuresis. Can continue to monitor off O2. Ambulatory SpO2 did not drop below 92% so not indicated for home O2 at this time.    Please call with questions

## 2020-09-04 NOTE — PROGRESS NOTE ADULT - ASSESSMENT
49 y/o Male with PMHx HTN, chronic LE edema, and cardiac murmur (since childhood) who presents from his outpatient PCP office for hypoxia and SOB was found to be in acute hypoxic repsiratory failure, was admitted for Acute systolic heart failure and CAP. patient intially placed on Bipap with improvement in hypoxia then transitioned to HFNC and now to NC,  Patient underwent echo showing reduced EF 30-35% and global hypokinesis and pulmHTN without valvular disease. Patient has been diuresing with IV lasix, and started on toprol and lisinopril. Patient tx to 5uris for further management of acute sCHF and plan for R+LHC with vasodilator study yesterday revealing normal coronaries, and pulmonary hypertension.

## 2020-09-04 NOTE — PROGRESS NOTE ADULT - PROVIDER SPECIALTY LIST ADULT
Cardiology
Cardiology
Critical Care
Critical Care
Internal Medicine
Intervent Cardiology
MICU
Pulmonology
Cardiology
Intervent Cardiology
Intervent Cardiology

## 2020-09-04 NOTE — CHART NOTE - NSCHARTNOTEFT_GEN_A_CORE
Admitting Diagnosis:   Patient is a 50y old  Male who presents with a chief complaint of SOB (03 Sep 2020 12:50)    PAST MEDICAL & SURGICAL HISTORY:  HTN (hypertension)  No significant past surgical history    Current Nutrition Order:  Diet, DASH/TLC:   Sodium & Cholesterol Restricted  Consistent Carbohydrate {No Snacks} (CSTCHO)  1000mL Fluid Restriction ([id number]) (09-04-20 @ 06:09)    PO Intake: Good (%) [ x  ]  Fair (50-75%) [   ] Poor (<25%) [   ]    GI Issues:   WDL, last BM 9/2  No n/v/d/c noted  No abd distention noted    Pain:  No pain noted at this time    Skin Integrity:  Surgical incision, alexandria score 20  +2 pitting edema BL feet  +3 pitting edema BL legs  No pressure ulcers noted    Labs:   09-04    142  |  98  |  22  ----------------------------<  151<H>  4.1   |  32<H>  |  1.05    Ca    9.6      04 Sep 2020 08:22  Mg     2.3     09-04    TPro  8.2  /  Alb  3.5  /  TBili  0.3  /  DBili  x   /  AST  20  /  ALT  34  /  AlkPhos  76  09-04    CAPILLARY BLOOD GLUCOSE    POCT Blood Glucose.: 106 mg/dL (04 Sep 2020 16:37)  POCT Blood Glucose.: 109 mg/dL (04 Sep 2020 12:27)  POCT Blood Glucose.: 117 mg/dL (04 Sep 2020 06:43)  POCT Blood Glucose.: 114 mg/dL (03 Sep 2020 23:37)  POCT Blood Glucose.: 100 mg/dL (03 Sep 2020 16:54)    Medications:  MEDICATIONS  (STANDING):  aspirin enteric coated 81 milliGRAM(s) Oral daily  dextrose 5%. 1000 milliLiter(s) (50 mL/Hr) IV Continuous <Continuous>  dextrose 50% Injectable 12.5 Gram(s) IV Push once  dextrose 50% Injectable 25 Gram(s) IV Push once  dextrose 50% Injectable 25 Gram(s) IV Push once  insulin lispro (HumaLOG) corrective regimen sliding scale   SubCutaneous Before meals and at bedtime  iron sucrose IVPB 200 milliGRAM(s) IV Intermittent <User Schedule>  metoprolol succinate ER 25 milliGRAM(s) Oral every 12 hours  sacubitril 24 mG/valsartan 26 mG 1 Tablet(s) Oral every 12 hours    MEDICATIONS  (PRN):  dextrose 40% Gel 15 Gram(s) Oral once PRN Blood Glucose LESS THAN 70 milliGRAM(s)/deciliter  glucagon  Injectable 1 milliGRAM(s) IntraMuscular once PRN Glucose LESS THAN 70 milligrams/deciliter    Admitted Anthropometrics:  5'8''  pounds +-10%  8/28 330 pounds   BMI 50.2  %DRO=529    Weights:  8/28 330lbs   9/1 327lbs  9/2 324.2lbs  9/3 326.2lbs  9/4 322.9lbs    Weight Change: Weight changes noted likely 2/2 fluid overload. Weight has improved likely 2/2 improvement of edema from +4 to +2-+3. Cont to trend weights daily.     Nutrition Focused Physical Exam: Completed [   ]  Unable to complete [  X ]    Estimated energy needs:   IBW used to calculate energy needs due to pt's current body weight exceeding 120% of IBW   Adjusted for age, BMI, possible COVID  ~1800kcal/day 25kcal/kg  ~85-110gm/day 1.2-1.4gm/kg  Fluids per team     Subjective:   49 y/o M with hx of HTN and chronic LE edema, cardiac murmur (since childhood), hx of HFEF, that presents from his outpatient PCP office for hypoxia and SOB. Pt states for the past 1-2 weeks he has been experiencing anxiety which has resulted in insomnia. Pt presents with tachypnea and hypoxia to 70s on RA with improvement to 96% on NRB. 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 ICU consulted for hypoxic respiratory failure and r/o COVID. Cardiology consulted 2/2 Echo demonstrated reduced EF to 30-35% with global hypokinesis. Pt is COVID negative. s/p RHC testing 9/2 suggests significant vasodilator response from NO, and his TPG/DPG and wedge pressures suggest principal post-capillary component of PHTN- plan for f/u outpt. Pt with current plan for d/c this weekend per EMR. On assessment, pt is resting in bed without compliants. Currently on DASH/ TLC/ CST CHO diet with 1000 ml FR. Pt tolerating PO well consuming >75% of meals. Cont to encourage adequate PO intake. Pt was thoroughly educated during previous assessment- Reenforcement of current diets provided, pt well educated. Please see nutrition recs below. RD to follow.     Nutrition Diagnosis: Inadequate energy intake RT intake<EER AEB Pt had been NPO, diet just advanced and Pending meal   Goal: Pt will meet at least 75% of nutrient needs     Recommendations:  1. Monitor PO intake/appetite, GI distress, diet tolerance, labs, weights.  2. Honor pt food preferences as able.  3. RD to remain available for additional nutrition interventions as needed.   4. Monitor lytes and replete prn    Education: Pt was thoroughly educated during previous assessment- Reenforcement of current diets provided, pt well educated.     Risk Level: High [   ] Moderate [  X ] Low [   ].

## 2020-09-04 NOTE — PROGRESS NOTE ADULT - PROBLEM SELECTOR PROBLEM 9
Diabetes mellitus type 2 in obese
Diabetes mellitus type 2 in obese
Nutrition, metabolism, and development symptoms
Diabetes mellitus type 2 in obese

## 2020-09-04 NOTE — PROGRESS NOTE ADULT - PROBLEM SELECTOR PLAN 8
Patient found to have low Hgb 10.0 on admission without any signs of bleeding.   - Hgb improving to 11 9/3  - iron studies c/w severe BRUNA and receiving IV iron x 5 doses   - Continue to monitor

## 2020-09-04 NOTE — PROGRESS NOTE ADULT - PROBLEM SELECTOR PLAN 6
initially tachypnea, tachycardia, febrile 100.1 rectally, and WBC > 19,   - Afebrile WBC WNL today, without cough  - s/p Azithromycin/Ceftriaxone  - Repeat procal 8/31: 0.07 (downtrending)  - blood cx - NGTD   - Urine cx NGTD   - UA negative  - Covid negative x3  - HIV negative

## 2020-09-04 NOTE — PROGRESS NOTE ADULT - PROBLEM SELECTOR PLAN 3
TOP-BANG = 7, high risk for underlying SHONA and probably with OHS component as well.   - witnessed apneas w/ concomitant desaturation while pt sleeping on tele which also supports this diagnosis. Can only be formally diagnosed by polysomnography. -Room air ABG 9/1 c/w compensated respiratory acidosis.  -will need a formal sleep study/PSG performed as an outpatient to eval for SHONA  -pt approved for bipap on dc per SW,   -NEEDED IN DISCHARGE NOTE: "Patient instructed to call [facility] to set up an appointment with a respiratory therapist to come to his home and set up BiPAP. Phone number given: [phone]"

## 2020-09-04 NOTE — PROGRESS NOTE ADULT - PROBLEM SELECTOR PLAN 2
STABLE/improved, though still slightly above baseline but below relative high of 40 during hospitalization (and he responded well to adjunct acetazolamide)     Recommendations:  - continue to monitor serum bicarb

## 2020-09-05 ENCOUNTER — TRANSCRIPTION ENCOUNTER (OUTPATIENT)
Age: 50
End: 2020-09-05

## 2020-09-05 VITALS — HEART RATE: 82 BPM | OXYGEN SATURATION: 96 % | RESPIRATION RATE: 17 BRPM

## 2020-09-05 LAB
ANION GAP SERPL CALC-SCNC: 9 MMOL/L — SIGNIFICANT CHANGE UP (ref 5–17)
BUN SERPL-MCNC: 20 MG/DL — SIGNIFICANT CHANGE UP (ref 7–23)
CALCIUM SERPL-MCNC: 9.2 MG/DL — SIGNIFICANT CHANGE UP (ref 8.4–10.5)
CHLORIDE SERPL-SCNC: 102 MMOL/L — SIGNIFICANT CHANGE UP (ref 96–108)
CO2 SERPL-SCNC: 31 MMOL/L — SIGNIFICANT CHANGE UP (ref 22–31)
CREAT SERPL-MCNC: 0.93 MG/DL — SIGNIFICANT CHANGE UP (ref 0.5–1.3)
GLUCOSE BLDC GLUCOMTR-MCNC: 104 MG/DL — HIGH (ref 70–99)
GLUCOSE BLDC GLUCOMTR-MCNC: 125 MG/DL — HIGH (ref 70–99)
GLUCOSE SERPL-MCNC: 126 MG/DL — HIGH (ref 70–99)
HCT VFR BLD CALC: 35.7 % — LOW (ref 39–50)
HGB BLD-MCNC: 10 G/DL — LOW (ref 13–17)
MAGNESIUM SERPL-MCNC: 2.4 MG/DL — SIGNIFICANT CHANGE UP (ref 1.6–2.6)
MCHC RBC-ENTMCNC: 20.4 PG — LOW (ref 27–34)
MCHC RBC-ENTMCNC: 28 GM/DL — LOW (ref 32–36)
MCV RBC AUTO: 73 FL — LOW (ref 80–100)
NRBC # BLD: 0 /100 WBCS — SIGNIFICANT CHANGE UP (ref 0–0)
PLATELET # BLD AUTO: 390 K/UL — SIGNIFICANT CHANGE UP (ref 150–400)
POTASSIUM SERPL-MCNC: 4 MMOL/L — SIGNIFICANT CHANGE UP (ref 3.5–5.3)
POTASSIUM SERPL-SCNC: 4 MMOL/L — SIGNIFICANT CHANGE UP (ref 3.5–5.3)
RBC # BLD: 4.89 M/UL — SIGNIFICANT CHANGE UP (ref 4.2–5.8)
RBC # FLD: 21.3 % — HIGH (ref 10.3–14.5)
SODIUM SERPL-SCNC: 142 MMOL/L — SIGNIFICANT CHANGE UP (ref 135–145)
WBC # BLD: 9.45 K/UL — SIGNIFICANT CHANGE UP (ref 3.8–10.5)
WBC # FLD AUTO: 9.45 K/UL — SIGNIFICANT CHANGE UP (ref 3.8–10.5)

## 2020-09-05 PROCEDURE — 99238 HOSP IP/OBS DSCHRG MGMT 30/<: CPT

## 2020-09-05 RX ORDER — SACUBITRIL AND VALSARTAN 24; 26 MG/1; MG/1
1 TABLET, FILM COATED ORAL
Qty: 60 | Refills: 2
Start: 2020-09-05 | End: 2020-12-03

## 2020-09-05 RX ORDER — METOPROLOL TARTRATE 50 MG
1 TABLET ORAL
Qty: 30 | Refills: 2
Start: 2020-09-05 | End: 2020-12-03

## 2020-09-05 RX ORDER — ASPIRIN/CALCIUM CARB/MAGNESIUM 324 MG
1 TABLET ORAL
Qty: 0 | Refills: 0 | DISCHARGE
Start: 2020-09-05

## 2020-09-05 RX ORDER — FERROUS SULFATE 325(65) MG
1 TABLET ORAL
Qty: 0 | Refills: 0 | DISCHARGE

## 2020-09-05 RX ADMIN — Medication 40 MILLIGRAM(S): at 12:16

## 2020-09-05 RX ADMIN — SACUBITRIL AND VALSARTAN 1 TABLET(S): 24; 26 TABLET, FILM COATED ORAL at 11:08

## 2020-09-05 RX ADMIN — IRON SUCROSE 110 MILLIGRAM(S): 20 INJECTION, SOLUTION INTRAVENOUS at 11:04

## 2020-09-05 RX ADMIN — Medication 25 MILLIGRAM(S): at 00:16

## 2020-09-05 RX ADMIN — Medication 81 MILLIGRAM(S): at 11:04

## 2020-09-05 NOTE — DISCHARGE NOTE PROVIDER - HOSPITAL COURSE
49 y/o morbidly obese Male with PMHx HTN, chronic LE edema, and cardiac murmur (since childhood) who presents from his outpatient PCP office for hypoxia and SOB was found to be in acute hypoxic respiratory failure, was admitted for Acute systolic heart failure and CAP. Patient intially placed on Bipap with improvement in hypoxia then transitioned to HFNC and now to NC and now RA. Patient underwent echo 8/26/2020 showing reduced EF 30-35% and global hypokinesis, pulmHTN PASP 76 mm Hg without valvular disease. Patient was diuresed with IV lasix, and started on toprol and lisinopril. Patient tx to 5uris for further management of acute sCHF and plan for R+LHC with vasodilator study 9/3/2020 revealing normal coronaries, pulmonary hypertension significant reduction with nitric oxide. Repeat echo 9/4/20: Mildly dilated left ventricular size, Mild symmetric left ventricular hypertrophy. Moderately left reduced ventricular systolic function, Aortic sclerosis without significant stenosis, No evidence of pulmonary hypertension or pericardial effusion. Lisinopril was discontinued and patient transitioned to Entresto 24/26 mg on 9/1 PM. IV Lasix transitioned to PO Torsemide on 9/5/2020. Hospital course significant for witnessed apneas w/ concomitant desaturation while pt sleeping on tele which supports diagnosis of SHONA however patient can only be formally diagnosed by polysomnography. Room air ABG 9/1 c/w compensated respiratory acidosis. Patient was approved for Bipap by  and was instructed to call [facility] to set up an appointment with a respiratory therapist to come to his home and set up BiPAP. Phone number given: [phone]". Patient treated for CAP with Azithromycin and Ceftriaxone (initially tachypnea, tachycardia, febrile 100.1 rectally, and WBC > 19). Blood Cx and Urine Cx no growth to date. U/A negative. Covid negative x 3 and HIV negative. Leukocytosis has resolved and patient afebrile. Patient is euvolemic on exam and C/P free and HD stable and cleared for discharge by Dr. Alberto. [facility] confirmed Entresto covered with initial $85 payment and then copay will be $25 a month. 1 month supply Entresto provided by [facility]. Prescriptions for Toprol, ASA, Entresto, and Torsemide E-Prescribed to [facility].     VSS    CVS: +S1 S2. RRR. No murmurs    Lungs: CTA Bilaterally. No rhonchi, wheezes, rales. Decreased BS bases.    Abd: Obese. BS x 4. Soft NT/ND     Ext: 1+ edema BLE most pronounced at dorsum of feet.

## 2020-09-05 NOTE — DISCHARGE NOTE PROVIDER - NSDCCPCAREPLAN_GEN_ALL_CORE_FT
PRINCIPAL DISCHARGE DIAGNOSIS  Diagnosis: Systolic CHF, acute  Assessment and Plan of Treatment: You were admitted with fluid overload treated with IV Lasix and now switched to an oral water pill Torsemide 40 mg daily ( two 20 mg pills daily). It is very important you take this medication as prescribed. You have decreased heart function and your heart does not pump normally putting you at risk for fluid overload. Normal heart function is EF 55% and above. Your EF is 35%. WEIGH YOURSELF DAILY. IF YOU NOTICE INCREASE IN WEIGHT OF 1-2 LBS IN 24 HOURS OR SHORTNESS OF BREATH OR LEG SWELLING CONTACT YOUR CARDIOLOGIST AND PROCEED TO NEAREST EMERGENCY ROOM. Follow-up with Dr. Alberto in 1week [phone]. RESTRICT FLUID INTAKE TO 1 L (1000 mL /cc) Daily. You were started on Entresto 24/26 mg By Mouth Twice Daily and Toprol XL 25 mg By Mouth Once Daily.      SECONDARY DISCHARGE DIAGNOSES  Diagnosis: SHONA (obstructive sleep apnea)  Assessment and Plan of Treatment: You likely have sleep Apnea but need an official sleep study to diagnose this . You were set up for Bipap by Social work. Call [facility] to set up an appointment with a respiratory therapist to come to his home and set up BiPAP. Phone number given: [phone]". Follow-up with Dr. Connelly (Pulmonology)      Diagnosis: Pneumonia  Assessment and Plan of Treatment: You were treated with Antibiotics for Pneumonia. If you experience fever, chills, cough proceed to nearest Emergency Room.    Diagnosis: Iron deficiency anemia  Assessment and Plan of Treatment: You were found to be very Iron deficient and treated with IV Iron. Please take Ferrous Sulfate 325 mg By Mouth Once Daily. Monitor for dark black stool, Bright red blood or blood in the urine. PRINCIPAL DISCHARGE DIAGNOSIS  Diagnosis: Systolic CHF, acute  Assessment and Plan of Treatment: You were admitted with fluid overload treated with IV Lasix and now switched to an oral water pill Torsemide 40 mg daily ( two 20 mg pills daily). It is very important you take this medication as prescribed. You have decreased heart function and your heart does not pump normally putting you at risk for fluid overload. Normal heart function is EF 55% and above. Your EF is 35%. WEIGH YOURSELF DAILY. IF YOU NOTICE INCREASE IN WEIGHT OF 1-2 LBS IN 24 HOURS OR SHORTNESS OF BREATH OR LEG SWELLING CONTACT YOUR CARDIOLOGIST AND PROCEED TO NEAREST EMERGENCY ROOM. Follow-up with Dr. Alberto in 1week [phone]. RESTRICT FLUID INTAKE TO 1 L (1000 mL /cc) Daily. You were started on Entresto 24/26 mg By Mouth Twice Daily and Toprol XL 25 mg By Mouth Once Daily.      SECONDARY DISCHARGE DIAGNOSES  Diagnosis: SHONA (obstructive sleep apnea)  Assessment and Plan of Treatment: You likely have sleep Apnea but need an official sleep study to diagnose this . You were set up for Bipap by Social work. Call [facility] to set up an appointment with a respiratory therapist to come to his home and set up BiPAP. Phone number given: [phone]". Follow-up with Dr. Herbert (Pulmonology)      Diagnosis: Pneumonia  Assessment and Plan of Treatment: You were treated with Antibiotics for Pneumonia. If you experience fever, chills, cough proceed to nearest Emergency Room.    Diagnosis: Iron deficiency anemia  Assessment and Plan of Treatment: You were found to be very Iron deficient and treated with IV Iron. Please take Ferrous Sulfate 325 mg By Mouth Once Daily. Monitor for dark black stool, Bright red blood or blood in the urine.    Diagnosis: Diabetes mellitus type 2 in obese  Assessment and Plan of Treatment: You were found to have new diagnosis of Diabetes with Hemoglobin A1C 6.5%. Follow-up with Primary Care Physician.

## 2020-09-05 NOTE — DISCHARGE NOTE PROVIDER - INSTRUCTIONS
You underwent a cardiac angiogram and should wait 3 days before returning to ordinary activities. Do not drive for 2 days. Consult your doctor before returning to vigorous activity. You may return to work in 3-5 days. The catheter from your right wrist was removed. You may shower once the dressing is removed, but avoid baths, hot tubs, or swimming for 5 days to prevent infection. If you notice bleeding from the site, hardening and pain at the site, drainage or redness from the site, coolness/paleness of the right arm  weakness/paralysis of right arm (unable to lift or move) swelling, or fever, please call [phone] Please continue your Aspirin as prescribed unless otherwise indicated by your cardiologist. Please follow up with Dr. Alberto in 1-2 weeks. All of your needed prescriptions have been sent electronically to your pharmacy.

## 2020-09-05 NOTE — DISCHARGE NOTE PROVIDER - NSDCMRMEDTOKEN_GEN_ALL_CORE_FT
Entresto 24 mg-26 mg oral tablet: 1 tab(s) orally 2 times a day   valsartan-hydrochlorothiazide 320mg-25mg oral tablet: 1 tab(s) orally once a day aspirin 81 mg oral delayed release tablet: 1 tab(s) orally once a day  Entresto 24 mg-26 mg oral tablet: 1 tab(s) orally 2 times a day   metoprolol succinate 25 mg oral tablet, extended release: 1 tab(s) orally once a day  torsemide 20 mg oral tablet: 2 tab(s) orally once a day aspirin 81 mg oral delayed release tablet: 1 tab(s) orally once a day  Entresto 24 mg-26 mg oral tablet: 1 tab(s) orally 2 times a day   ferrous sulfate 325 mg (65 mg elemental iron) oral tablet: 1 tab(s) orally once a day  metoprolol succinate 25 mg oral tablet, extended release: 1 tab(s) orally once a day  torsemide 20 mg oral tablet: 2 tab(s) orally once a day

## 2020-09-05 NOTE — DISCHARGE NOTE PROVIDER - CARE PROVIDERS DIRECT ADDRESSES
,DirectAddress_Unknown ,DirectAddress_Unknown,[email or web address]

## 2020-09-05 NOTE — DISCHARGE NOTE PROVIDER - PROVIDER TOKENS
FREE:[LAST:[Remy],FIRST:[Juanita],PHONE:[phone]:[(   )    -],ADDRESS:[[address] [place] [address]],FOLLOWUP:[1 week]] FREE:[LAST:[Remy],FIRST:[Juanita],PHONE:[phone]],FAX:[(   )    -],ADDRESS:[[address] [place] [address]],FOLLOWUP:[1 week]],PROVIDER:[TOKEN:[[id number]]]

## 2020-09-05 NOTE — DISCHARGE NOTE NURSING/CASE MANAGEMENT/SOCIAL WORK - PATIENT PORTAL LINK FT
You can access the FollowMyHealth Patient Portal offered by [facility] by registering at the following website: [email or web address]. By joining [facility]s FollowMyHealth portal, you will also be able to view your health information using other applications (apps) compatible with our system.

## 2020-09-08 PROBLEM — Z00.00 ENCOUNTER FOR PREVENTIVE HEALTH EXAMINATION: Status: ACTIVE | Noted: 2020-09-08

## 2020-09-08 PROBLEM — I10 ESSENTIAL (PRIMARY) HYPERTENSION: Chronic | Status: ACTIVE | Noted: 2020-08-28

## 2020-09-10 DIAGNOSIS — J96.22 ACUTE AND CHRONIC RESPIRATORY FAILURE WITH HYPERCAPNIA: ICD-10-CM

## 2020-09-10 DIAGNOSIS — E11.9 TYPE 2 DIABETES MELLITUS WITHOUT COMPLICATIONS: ICD-10-CM

## 2020-09-10 DIAGNOSIS — D50.9 IRON DEFICIENCY ANEMIA, UNSPECIFIED: ICD-10-CM

## 2020-09-10 DIAGNOSIS — I25.10 ATHEROSCLEROTIC HEART DISEASE OF NATIVE CORONARY ARTERY WITHOUT ANGINA PECTORIS: ICD-10-CM

## 2020-09-10 DIAGNOSIS — J96.21 ACUTE AND CHRONIC RESPIRATORY FAILURE WITH HYPOXIA: ICD-10-CM

## 2020-09-10 DIAGNOSIS — B33.24 VIRAL CARDIOMYOPATHY: ICD-10-CM

## 2020-09-10 DIAGNOSIS — I50.21 ACUTE SYSTOLIC (CONGESTIVE) HEART FAILURE: ICD-10-CM

## 2020-09-10 DIAGNOSIS — A41.9 SEPSIS, UNSPECIFIED ORGANISM: ICD-10-CM

## 2020-09-10 DIAGNOSIS — J81.1 CHRONIC PULMONARY EDEMA: ICD-10-CM

## 2020-09-10 DIAGNOSIS — J18.9 PNEUMONIA, UNSPECIFIED ORGANISM: ICD-10-CM

## 2020-09-10 DIAGNOSIS — I11.0 HYPERTENSIVE HEART DISEASE WITH HEART FAILURE: ICD-10-CM

## 2020-09-10 DIAGNOSIS — E66.01 MORBID (SEVERE) OBESITY DUE TO EXCESS CALORIES: ICD-10-CM

## 2020-09-10 DIAGNOSIS — E87.1 HYPO-OSMOLALITY AND HYPONATREMIA: ICD-10-CM

## 2020-09-10 DIAGNOSIS — G47.33 OBSTRUCTIVE SLEEP APNEA (ADULT) (PEDIATRIC): ICD-10-CM

## 2020-09-10 DIAGNOSIS — E66.2 MORBID (SEVERE) OBESITY WITH ALVEOLAR HYPOVENTILATION: ICD-10-CM

## 2020-09-10 DIAGNOSIS — R53.1 WEAKNESS: ICD-10-CM

## 2020-09-10 DIAGNOSIS — I27.20 PULMONARY HYPERTENSION, UNSPECIFIED: ICD-10-CM

## 2020-09-10 DIAGNOSIS — E87.2 ACIDOSIS: ICD-10-CM

## 2020-09-15 ENCOUNTER — APPOINTMENT (OUTPATIENT)
Dept: HEART AND VASCULAR | Facility: CLINIC | Age: 50
End: 2020-09-15
Payer: COMMERCIAL

## 2020-09-15 ENCOUNTER — NON-APPOINTMENT (OUTPATIENT)
Age: 50
End: 2020-09-15

## 2020-09-15 VITALS
BODY MASS INDEX: 47.29 KG/M2 | HEIGHT: 68 IN | WEIGHT: 312 LBS | DIASTOLIC BLOOD PRESSURE: 80 MMHG | TEMPERATURE: 98.8 F | HEART RATE: 92 BPM | SYSTOLIC BLOOD PRESSURE: 110 MMHG

## 2020-09-15 DIAGNOSIS — Z78.9 OTHER SPECIFIED HEALTH STATUS: ICD-10-CM

## 2020-09-15 DIAGNOSIS — Z82.49 FAMILY HISTORY OF ISCHEMIC HEART DISEASE AND OTHER DISEASES OF THE CIRCULATORY SYSTEM: ICD-10-CM

## 2020-09-15 DIAGNOSIS — Z86.79 PERSONAL HISTORY OF OTHER DISEASES OF THE CIRCULATORY SYSTEM: ICD-10-CM

## 2020-09-15 DIAGNOSIS — E66.01 MORBID (SEVERE) OBESITY DUE TO EXCESS CALORIES: ICD-10-CM

## 2020-09-15 DIAGNOSIS — I50.20 UNSPECIFIED SYSTOLIC (CONGESTIVE) HEART FAILURE: ICD-10-CM

## 2020-09-15 DIAGNOSIS — Z86.69 PERSONAL HISTORY OF OTHER DISEASES OF THE NERVOUS SYSTEM AND SENSE ORGANS: ICD-10-CM

## 2020-09-15 DIAGNOSIS — G47.33 OBSTRUCTIVE SLEEP APNEA (ADULT) (PEDIATRIC): ICD-10-CM

## 2020-09-15 DIAGNOSIS — I10 ESSENTIAL (PRIMARY) HYPERTENSION: ICD-10-CM

## 2020-09-15 DIAGNOSIS — Z83.3 FAMILY HISTORY OF DIABETES MELLITUS: ICD-10-CM

## 2020-09-15 PROCEDURE — 99214 OFFICE O/P EST MOD 30 MIN: CPT

## 2020-09-15 PROCEDURE — 93000 ELECTROCARDIOGRAM COMPLETE: CPT

## 2020-09-15 RX ORDER — CHLORHEXIDINE GLUCONATE 4 %
325 (65 FE) LIQUID (ML) TOPICAL DAILY
Qty: 30 | Refills: 5 | Status: ACTIVE | COMMUNITY
Start: 2020-09-15 | End: 1900-01-01

## 2020-09-15 RX ORDER — SACUBITRIL AND VALSARTAN 24; 26 MG/1; MG/1
24-26 TABLET, FILM COATED ORAL
Refills: 0 | Status: ACTIVE | COMMUNITY

## 2020-09-15 RX ORDER — TORSEMIDE 5 MG/1
TABLET ORAL
Refills: 0 | Status: ACTIVE | COMMUNITY

## 2020-09-15 RX ORDER — ASPIRIN 81 MG/1
81 TABLET ORAL
Qty: 30 | Refills: 5 | Status: ACTIVE | COMMUNITY
Start: 2020-09-15 | End: 1900-01-01

## 2020-09-15 RX ORDER — ASPIRIN 81 MG
81 TABLET,CHEWABLE ORAL
Refills: 0 | Status: ACTIVE | COMMUNITY

## 2020-09-15 RX ORDER — METOPROLOL TARTRATE 75 MG/1
TABLET, FILM COATED ORAL
Refills: 0 | Status: ACTIVE | COMMUNITY

## 2020-09-15 NOTE — DISCUSSION/SUMMARY
[FreeTextEntry1] : Assessment/Plan:\par Patient is 50 year old obese male with HTN, chronic LE edema, SHONA on CPAP, recently diagnosed with new NICM/systolic heart failure seen today for follow up post [facility] DC 9/5/20. Feels well since dc, has been compliant with all meds and with CPAP. States he can walk 20 blocks and climb 2 flight of stairs without SOB/fatigue. He reports no LE edema, CP. No dizziness, palpitations. No LOC.\par \par -Newly diagnosed NICM/systolic HF: feels well without any complaints, euvolemic on exam. Increase entresto 49/51 - 1 tab 2x day, cont toprol 50 mg qd. Re eval EF in 3 months\par \par -SHONA: compliant with CPAP, suspect this will help improved EF\par \par -HTN: BP controlled on metoprolol\par \par Followup 3 weeks

## 2020-09-15 NOTE — HISTORY OF PRESENT ILLNESS
[FreeTextEntry1] : Patient is 50 year old obese male with HTN, chronic LE edema, SHONA on CPAP, recently diagnosed with new NICM/systolic heart failure seen today for follow up post [facility] DC 9/5/20. Feels well since dc, has been compliant with all meds and with CPAP. States he can walk 20 blocks and climb 2 flight of stairs without SOB/fatigue. He reports no LE edema, CP. No dizziness, palpitations. No LOC.

## 2020-09-15 NOTE — PHYSICAL EXAM
[General Appearance - Well Developed] : well developed [Normal Appearance] : normal appearance [No Deformities] : no deformities [Well Groomed] : well groomed [General Appearance - Well Nourished] : well nourished [General Appearance - In No Acute Distress] : no acute distress [Normal Conjunctiva] : the conjunctiva exhibited no abnormalities [Eyelids - No Xanthelasma] : the eyelids demonstrated no xanthelasmas [No Oral Pallor] : no oral pallor [Normal Oral Mucosa] : normal oral mucosa [Normal Jugular Venous A Waves Present] : normal jugular venous A waves present [No Oral Cyanosis] : no oral cyanosis [Normal Jugular Venous V Waves Present] : normal jugular venous V waves present [Heart Rate And Rhythm] : heart rate and rhythm were normal [No Jugular Venous Diehl A Waves] : no jugular venous diehl A waves [Heart Sounds] : normal S1 and S2 [Murmurs] : no murmurs present [Respiration, Rhythm And Depth] : normal respiratory rhythm and effort [Auscultation Breath Sounds / Voice Sounds] : lungs were clear to auscultation bilaterally [Exaggerated Use Of Accessory Muscles For Inspiration] : no accessory muscle use [Abdomen Soft] : soft [Abdomen Tenderness] : non-tender [Abdomen Mass (___ Cm)] : no abdominal mass palpated [Gait - Sufficient For Exercise Testing] : the gait was sufficient for exercise testing [Abnormal Walk] : normal gait [Nail Clubbing] : no clubbing of the fingernails [Cyanosis, Localized] : no localized cyanosis [Petechial Hemorrhages (___cm)] : no petechial hemorrhages [Skin Color & Pigmentation] : normal skin color and pigmentation [] : no rash [No Venous Stasis] : no venous stasis [No Skin Ulcers] : no skin ulcer [Skin Lesions] : no skin lesions [No Xanthoma] : no  xanthoma was observed [Oriented To Time, Place, And Person] : oriented to person, place, and time [Affect] : the affect was normal [No Anxiety] : not feeling anxious [Mood] : the mood was normal

## 2020-09-17 PROCEDURE — 85025 COMPLETE CBC W/AUTO DIFF WBC: CPT

## 2020-09-17 PROCEDURE — 83935 ASSAY OF URINE OSMOLALITY: CPT

## 2020-09-17 PROCEDURE — 87899 AGENT NOS ASSAY W/OPTIC: CPT

## 2020-09-17 PROCEDURE — 83605 ASSAY OF LACTIC ACID: CPT

## 2020-09-17 PROCEDURE — 86140 C-REACTIVE PROTEIN: CPT

## 2020-09-17 PROCEDURE — 85610 PROTHROMBIN TIME: CPT

## 2020-09-17 PROCEDURE — 71045 X-RAY EXAM CHEST 1 VIEW: CPT

## 2020-09-17 PROCEDURE — 84100 ASSAY OF PHOSPHORUS: CPT

## 2020-09-17 PROCEDURE — 71275 CT ANGIOGRAPHY CHEST: CPT

## 2020-09-17 PROCEDURE — 97116 GAIT TRAINING THERAPY: CPT

## 2020-09-17 PROCEDURE — 83540 ASSAY OF IRON: CPT

## 2020-09-17 PROCEDURE — 86901 BLOOD TYPING SEROLOGIC RH(D): CPT

## 2020-09-17 PROCEDURE — 83880 ASSAY OF NATRIURETIC PEPTIDE: CPT

## 2020-09-17 PROCEDURE — 87635 SARS-COV-2 COVID-19 AMP PRB: CPT

## 2020-09-17 PROCEDURE — 85384 FIBRINOGEN ACTIVITY: CPT

## 2020-09-17 PROCEDURE — 87633 RESP VIRUS 12-25 TARGETS: CPT

## 2020-09-17 PROCEDURE — 96374 THER/PROPH/DIAG INJ IV PUSH: CPT

## 2020-09-17 PROCEDURE — C1769: CPT

## 2020-09-17 PROCEDURE — 84443 ASSAY THYROID STIM HORMONE: CPT

## 2020-09-17 PROCEDURE — 87086 URINE CULTURE/COLONY COUNT: CPT

## 2020-09-17 PROCEDURE — 85027 COMPLETE CBC AUTOMATED: CPT

## 2020-09-17 PROCEDURE — 82330 ASSAY OF CALCIUM: CPT

## 2020-09-17 PROCEDURE — 84484 ASSAY OF TROPONIN QUANT: CPT

## 2020-09-17 PROCEDURE — 82962 GLUCOSE BLOOD TEST: CPT

## 2020-09-17 PROCEDURE — 83550 IRON BINDING TEST: CPT

## 2020-09-17 PROCEDURE — C1894: CPT

## 2020-09-17 PROCEDURE — 99291 CRITICAL CARE FIRST HOUR: CPT | Mod: 25

## 2020-09-17 PROCEDURE — 86850 RBC ANTIBODY SCREEN: CPT

## 2020-09-17 PROCEDURE — 80048 BASIC METABOLIC PNL TOTAL CA: CPT

## 2020-09-17 PROCEDURE — 85730 THROMBOPLASTIN TIME PARTIAL: CPT

## 2020-09-17 PROCEDURE — 84295 ASSAY OF SERUM SODIUM: CPT

## 2020-09-17 PROCEDURE — 80053 COMPREHEN METABOLIC PANEL: CPT

## 2020-09-17 PROCEDURE — 83036 HEMOGLOBIN GLYCOSYLATED A1C: CPT

## 2020-09-17 PROCEDURE — 87040 BLOOD CULTURE FOR BACTERIA: CPT

## 2020-09-17 PROCEDURE — 94660 CPAP INITIATION&MGMT: CPT

## 2020-09-17 PROCEDURE — 82550 ASSAY OF CK (CPK): CPT

## 2020-09-17 PROCEDURE — 87389 HIV-1 AG W/HIV-1&-2 AB AG IA: CPT

## 2020-09-17 PROCEDURE — 82803 BLOOD GASES ANY COMBINATION: CPT

## 2020-09-17 PROCEDURE — 93005 ELECTROCARDIOGRAM TRACING: CPT

## 2020-09-17 PROCEDURE — 87449 NOS EACH ORGANISM AG IA: CPT

## 2020-09-17 PROCEDURE — 85379 FIBRIN DEGRADATION QUANT: CPT

## 2020-09-17 PROCEDURE — C1887: CPT

## 2020-09-17 PROCEDURE — 83615 LACTATE (LD) (LDH) ENZYME: CPT

## 2020-09-17 PROCEDURE — 84300 ASSAY OF URINE SODIUM: CPT

## 2020-09-17 PROCEDURE — 84132 ASSAY OF SERUM POTASSIUM: CPT

## 2020-09-17 PROCEDURE — 83930 ASSAY OF BLOOD OSMOLALITY: CPT

## 2020-09-17 PROCEDURE — 93321 DOPPLER ECHO F-UP/LMTD STD: CPT

## 2020-09-17 PROCEDURE — C8929: CPT

## 2020-09-17 PROCEDURE — 97162 PT EVAL MOD COMPLEX 30 MIN: CPT

## 2020-09-17 PROCEDURE — 82728 ASSAY OF FERRITIN: CPT

## 2020-09-17 PROCEDURE — 83735 ASSAY OF MAGNESIUM: CPT

## 2020-09-17 PROCEDURE — 80061 LIPID PANEL: CPT

## 2020-09-17 PROCEDURE — 81001 URINALYSIS AUTO W/SCOPE: CPT

## 2020-09-17 PROCEDURE — 36415 COLL VENOUS BLD VENIPUNCTURE: CPT

## 2020-09-17 PROCEDURE — 84145 PROCALCITONIN (PCT): CPT

## 2020-09-17 PROCEDURE — U0003: CPT

## 2020-10-06 ENCOUNTER — APPOINTMENT (OUTPATIENT)
Dept: HEART AND VASCULAR | Facility: CLINIC | Age: 50
End: 2020-10-06
Payer: COMMERCIAL

## 2020-10-06 VITALS
HEART RATE: 94 BPM | HEIGHT: 68 IN | DIASTOLIC BLOOD PRESSURE: 82 MMHG | BODY MASS INDEX: 47.74 KG/M2 | WEIGHT: 315 LBS | OXYGEN SATURATION: 98 % | SYSTOLIC BLOOD PRESSURE: 110 MMHG

## 2020-10-06 VITALS — DIASTOLIC BLOOD PRESSURE: 70 MMHG | SYSTOLIC BLOOD PRESSURE: 100 MMHG

## 2020-10-06 VITALS — TEMPERATURE: 98 F

## 2020-10-06 PROCEDURE — 99213 OFFICE O/P EST LOW 20 MIN: CPT

## 2020-10-06 NOTE — HISTORY OF PRESENT ILLNESS
[FreeTextEntry1] : Patient is 50 year old obese male with HTN, chronic LE edema, SHONA on CPAP, recently diagnosed with new NICM/systolic heart failure seen today for follow up. Feels well since dc, has been compliant with all meds and with CPAP. States he can walk 20 blocks and climb 2 flight of stairs without SOB/fatigue. He reports no LE edema, CP. No dizziness, palpitations. No LOC.\par \par Entresto dose increased last visit. Since then does not report added fatigue,dizziness, SOB.

## 2020-10-06 NOTE — PHYSICAL EXAM
[General Appearance - Well Developed] : well developed [Normal Appearance] : normal appearance [Well Groomed] : well groomed [General Appearance - Well Nourished] : well nourished [No Deformities] : no deformities [General Appearance - In No Acute Distress] : no acute distress [Normal Conjunctiva] : the conjunctiva exhibited no abnormalities [Eyelids - No Xanthelasma] : the eyelids demonstrated no xanthelasmas [Normal Oral Mucosa] : normal oral mucosa [No Oral Pallor] : no oral pallor [No Oral Cyanosis] : no oral cyanosis [Normal Jugular Venous A Waves Present] : normal jugular venous A waves present [Normal Jugular Venous V Waves Present] : normal jugular venous V waves present [No Jugular Venous Diehl A Waves] : no jugular venous diehl A waves [Respiration, Rhythm And Depth] : normal respiratory rhythm and effort [Exaggerated Use Of Accessory Muscles For Inspiration] : no accessory muscle use [Auscultation Breath Sounds / Voice Sounds] : lungs were clear to auscultation bilaterally [Heart Rate And Rhythm] : heart rate and rhythm were normal [Heart Sounds] : normal S1 and S2 [Murmurs] : no murmurs present [Abdomen Soft] : soft [Abdomen Tenderness] : non-tender [Abdomen Mass (___ Cm)] : no abdominal mass palpated [Abnormal Walk] : normal gait [Gait - Sufficient For Exercise Testing] : the gait was sufficient for exercise testing [Nail Clubbing] : no clubbing of the fingernails [Cyanosis, Localized] : no localized cyanosis [Petechial Hemorrhages (___cm)] : no petechial hemorrhages [Skin Color & Pigmentation] : normal skin color and pigmentation [] : no rash [No Venous Stasis] : no venous stasis [Skin Lesions] : no skin lesions [No Skin Ulcers] : no skin ulcer [No Xanthoma] : no  xanthoma was observed [Oriented To Time, Place, And Person] : oriented to person, place, and time [Affect] : the affect was normal [Mood] : the mood was normal [No Anxiety] : not feeling anxious

## 2020-10-06 NOTE — DISCUSSION/SUMMARY
[FreeTextEntry1] : Assessment/Plan:\par Patient is 50 year old obese male with HTN, chronic LE edema, SHONA on CPAP, recently diagnosed with new NICM/systolic heart failure seen today for follow up. Feels well since dc, has been compliant with all meds and with CPAP. States he can walk 20 blocks and climb 2 flight of stairs without SOB/fatigue. He reports no LE edema, CP. No dizziness, palpitations. No LOC.\par Entresto dose increased last visit. Since then does not report added fatigue,dizziness, SOB.\par \par -NICM/systolic HF: Doing well with increased dose of entresto - with lower, but stable BP without dizziness/fatigue. Remains euvolemic on exam. Cont entresto/toprol/torsemide\par \par -SHONA: compliant with CPAP\par \par -HTN: BP stable\par \par FU 3 months to reassess EF

## 2020-10-09 ENCOUNTER — APPOINTMENT (OUTPATIENT)
Dept: ENDOCRINOLOGY | Facility: CLINIC | Age: 50
End: 2020-10-09
Payer: COMMERCIAL

## 2020-10-09 VITALS
BODY MASS INDEX: 47.74 KG/M2 | SYSTOLIC BLOOD PRESSURE: 151 MMHG | HEART RATE: 83 BPM | DIASTOLIC BLOOD PRESSURE: 84 MMHG | HEIGHT: 68 IN | WEIGHT: 315 LBS

## 2020-10-09 DIAGNOSIS — E78.5 HYPERLIPIDEMIA, UNSPECIFIED: ICD-10-CM

## 2020-10-09 DIAGNOSIS — R73.03 PREDIABETES.: ICD-10-CM

## 2020-10-09 DIAGNOSIS — E66.01 MORBID (SEVERE) OBESITY DUE TO EXCESS CALORIES: ICD-10-CM

## 2020-10-09 PROCEDURE — 99204 OFFICE O/P NEW MOD 45 MIN: CPT | Mod: GC

## 2020-10-09 RX ORDER — ATORVASTATIN CALCIUM 20 MG/1
20 TABLET, FILM COATED ORAL
Qty: 60 | Refills: 3 | Status: ACTIVE | COMMUNITY
Start: 2020-10-09 | End: 1900-01-01

## 2020-10-09 RX ORDER — ISOPROPYL ALCOHOL 70 ML/100ML
SWAB TOPICAL
Qty: 1 | Refills: 1 | Status: ACTIVE | COMMUNITY
Start: 2020-10-09 | End: 1900-01-01

## 2020-10-09 RX ORDER — BLOOD SUGAR DIAGNOSTIC
STRIP MISCELLANEOUS TWICE DAILY
Qty: 1 | Refills: 1 | Status: ACTIVE | COMMUNITY
Start: 2020-10-09 | End: 1900-01-01

## 2020-10-09 RX ORDER — LANCETS 28 GAUGE
EACH MISCELLANEOUS
Qty: 1 | Refills: 1 | Status: ACTIVE | COMMUNITY
Start: 2020-10-09 | End: 1900-01-01

## 2020-10-09 RX ORDER — DULAGLUTIDE 0.75 MG/.5ML
0.75 INJECTION, SOLUTION SUBCUTANEOUS
Qty: 4 | Refills: 0 | Status: ACTIVE | COMMUNITY
Start: 2020-10-09 | End: 1900-01-01

## 2020-10-09 RX ORDER — DULAGLUTIDE 1.5 MG/.5ML
1.5 INJECTION, SOLUTION SUBCUTANEOUS
Qty: 8 | Refills: 3 | Status: ACTIVE | COMMUNITY
Start: 2020-10-09 | End: 1900-01-01

## 2020-10-09 RX ORDER — BLOOD-GLUCOSE METER
KIT MISCELLANEOUS
Qty: 1 | Refills: 0 | Status: ACTIVE | COMMUNITY
Start: 2020-10-09 | End: 1900-01-01

## 2020-10-28 RX ORDER — ATORVASTATIN CALCIUM 20 MG/1
20 TABLET, FILM COATED ORAL
Qty: 90 | Refills: 3 | Status: ACTIVE | COMMUNITY
Start: 2020-10-28 | End: 1900-01-01

## 2020-12-08 ENCOUNTER — APPOINTMENT (OUTPATIENT)
Dept: ENDOCRINOLOGY | Facility: CLINIC | Age: 50
End: 2020-12-08

## 2021-01-05 ENCOUNTER — APPOINTMENT (OUTPATIENT)
Dept: HEART AND VASCULAR | Facility: CLINIC | Age: 51
End: 2021-01-05
Payer: COMMERCIAL

## 2021-01-05 ENCOUNTER — NON-APPOINTMENT (OUTPATIENT)
Age: 51
End: 2021-01-05

## 2021-01-05 VITALS
BODY MASS INDEX: 47.59 KG/M2 | WEIGHT: 314 LBS | DIASTOLIC BLOOD PRESSURE: 84 MMHG | HEIGHT: 68 IN | HEART RATE: 82 BPM | TEMPERATURE: 97.8 F | SYSTOLIC BLOOD PRESSURE: 130 MMHG

## 2021-01-05 PROCEDURE — 99072 ADDL SUPL MATRL&STAF TM PHE: CPT

## 2021-01-05 PROCEDURE — 99214 OFFICE O/P EST MOD 30 MIN: CPT

## 2021-01-05 NOTE — DISCUSSION/SUMMARY
[FreeTextEntry1] : Assessment/Plan:\par Patient is 50 year old obese male with HTN, chronic LE edema, SHONA on CPAP, recently diagnosed with new NICM/systolic heart failure seen today for follow up. Feels well since dc, has been compliant with all meds and with CPAP. With increased exercise tolerance, able to walk > 30 mins everyday and climb 2 flight of stairs without SOB/fatigue. He reports no LE edema, CP. No dizziness, palpitations. No LOC.\par \par -NICM/systolic HF: Continues to feel well with increased exercise capacity with SOB/fatigue. Euvolemic on exam. On toprol and entresto. Repeat ECHO - re-eval EF\par \par -SHONA: compliant with CPAP\par \par -HTN: BP stable\par \par Fu next week post echo\par

## 2021-01-05 NOTE — HISTORY OF PRESENT ILLNESS
[FreeTextEntry1] : Patient is 50 year old obese male with HTN, chronic LE edema, SHONA on CPAP, recently diagnosed with new NICM/systolic heart failure seen today for follow up. Feels well since dc, has been compliant with all meds and with CPAP. With increased exercise tolerance, able to walk > 30 mins everyday and climb 2 flight of stairs without SOB/fatigue. He reports no LE edema, CP. No dizziness, palpitations. No LOC.\par \par

## 2021-01-06 ENCOUNTER — APPOINTMENT (OUTPATIENT)
Dept: ENDOCRINOLOGY | Facility: CLINIC | Age: 51
End: 2021-01-06

## 2021-01-12 ENCOUNTER — APPOINTMENT (OUTPATIENT)
Dept: HEART AND VASCULAR | Facility: CLINIC | Age: 51
End: 2021-01-12
Payer: COMMERCIAL

## 2021-01-12 VITALS
SYSTOLIC BLOOD PRESSURE: 130 MMHG | TEMPERATURE: 98.3 F | HEART RATE: 85 BPM | WEIGHT: 311 LBS | DIASTOLIC BLOOD PRESSURE: 90 MMHG | HEIGHT: 68 IN | BODY MASS INDEX: 47.13 KG/M2

## 2021-01-12 PROCEDURE — 99072 ADDL SUPL MATRL&STAF TM PHE: CPT

## 2021-01-12 PROCEDURE — 99214 OFFICE O/P EST MOD 30 MIN: CPT | Mod: 25

## 2021-01-12 PROCEDURE — ZZZZZ: CPT

## 2021-01-12 PROCEDURE — 93306 TTE W/DOPPLER COMPLETE: CPT

## 2021-01-12 NOTE — PHYSICAL EXAM
[General Appearance - Well Developed] : well developed [Normal Appearance] : normal appearance [Well Groomed] : well groomed [General Appearance - Well Nourished] : well nourished [No Deformities] : no deformities [General Appearance - In No Acute Distress] : no acute distress [Normal Conjunctiva] : the conjunctiva exhibited no abnormalities [Eyelids - No Xanthelasma] : the eyelids demonstrated no xanthelasmas [Normal Oral Mucosa] : normal oral mucosa [No Oral Pallor] : no oral pallor [No Oral Cyanosis] : no oral cyanosis [Normal Jugular Venous A Waves Present] : normal jugular venous A waves present [No Jugular Venous Diehl A Waves] : no jugular venous diehl A waves [Normal Jugular Venous V Waves Present] : normal jugular venous V waves present [Respiration, Rhythm And Depth] : normal respiratory rhythm and effort [Auscultation Breath Sounds / Voice Sounds] : lungs were clear to auscultation bilaterally [Exaggerated Use Of Accessory Muscles For Inspiration] : no accessory muscle use [Heart Sounds] : normal S1 and S2 [Heart Rate And Rhythm] : heart rate and rhythm were normal [Murmurs] : no murmurs present [Abdomen Soft] : soft [Abdomen Tenderness] : non-tender [Abdomen Mass (___ Cm)] : no abdominal mass palpated [Abnormal Walk] : normal gait [Gait - Sufficient For Exercise Testing] : the gait was sufficient for exercise testing [Cyanosis, Localized] : no localized cyanosis [Nail Clubbing] : no clubbing of the fingernails [Petechial Hemorrhages (___cm)] : no petechial hemorrhages [Skin Color & Pigmentation] : normal skin color and pigmentation [] : no rash [No Venous Stasis] : no venous stasis [Skin Lesions] : no skin lesions [No Xanthoma] : no  xanthoma was observed [No Skin Ulcers] : no skin ulcer [Oriented To Time, Place, And Person] : oriented to person, place, and time [Affect] : the affect was normal [Mood] : the mood was normal [No Anxiety] : not feeling anxious

## 2021-01-12 NOTE — HISTORY OF PRESENT ILLNESS
[FreeTextEntry1] : Patient is 50 year old obese male with HTN, chronic LE edema, SHONA on CPAP, recently diagnosed with new NICM/systolic heart failure seen today for follow up for repeat echo to re-eval EF. Feels well  has been compliant with all meds and with CPAP. With increased exercise tolerance, able to walk > 30 mins everyday and climb 2 flight of stairs without SOB/fatigue. He reports no LE edema, CP. No dizziness, palpitations. No LOC.\par \par

## 2021-01-12 NOTE — DISCUSSION/SUMMARY
[FreeTextEntry1] : Assessment/Plan: \par 50 year old obese male with HTN, chronic LE edema, SHONA on CPAP, recent dx of NICM/systolic HF seen today for follow for re-eval of EF\par \par -NICM/systolic HF: Continues to feel with sig improved exercise tolerance, tolerating entresto and toprol. ECHO today unchanged EF 30-35 with mildly dilated LV. Unclear etiology for NICM now 90 days post med tx. Cardiac MRI ordered- r/o underlying infiltrative disease amyloid/sarcoid/hemochromatosis \par \par -SHONA: complaint with CPAP\par \par -HTN: BP remains stable \par \par FU post cardiac MRI

## 2021-02-03 ENCOUNTER — APPOINTMENT (OUTPATIENT)
Dept: MRI IMAGING | Facility: HOSPITAL | Age: 51
End: 2021-02-03
Payer: COMMERCIAL

## 2021-02-03 ENCOUNTER — OUTPATIENT (OUTPATIENT)
Dept: OUTPATIENT SERVICES | Facility: HOSPITAL | Age: 51
LOS: 1 days | End: 2021-02-03
Payer: COMMERCIAL

## 2021-02-03 PROCEDURE — A9577: CPT

## 2021-02-03 PROCEDURE — 75561 CARDIAC MRI FOR MORPH W/DYE: CPT

## 2021-02-03 PROCEDURE — 75561 CARDIAC MRI FOR MORPH W/DYE: CPT | Mod: 26

## 2021-02-09 ENCOUNTER — APPOINTMENT (OUTPATIENT)
Dept: HEART AND VASCULAR | Facility: CLINIC | Age: 51
End: 2021-02-09
Payer: COMMERCIAL

## 2021-02-09 VITALS
DIASTOLIC BLOOD PRESSURE: 90 MMHG | HEART RATE: 86 BPM | SYSTOLIC BLOOD PRESSURE: 110 MMHG | TEMPERATURE: 98.2 F | HEIGHT: 68 IN | BODY MASS INDEX: 47.13 KG/M2 | WEIGHT: 311 LBS

## 2021-02-09 VITALS — DIASTOLIC BLOOD PRESSURE: 80 MMHG | SYSTOLIC BLOOD PRESSURE: 130 MMHG

## 2021-02-09 PROCEDURE — 99214 OFFICE O/P EST MOD 30 MIN: CPT

## 2021-02-09 PROCEDURE — 99072 ADDL SUPL MATRL&STAF TM PHE: CPT

## 2021-02-09 NOTE — DISCUSSION/SUMMARY
[FreeTextEntry1] : Assessment/Plan:\par Patient is 50 year old obese male with HTN, chronic LE edema, SHONA on CPAP, recently diagnosed with new NICM/systolic heart failure EF 35 seen today for follow up of cardiac MRI results.Feels well  has been compliant with all meds and with CPAP. With increased exercise tolerance, able to walk > 30 mins everyday and climb 2 flight of stairs without SOB/fatigue. He reports no LE edema, CP. No dizziness, palpitations. No LOC.\par \par -Cardiac MRI: EF 32 with dilated LV. Decreased enhancement in basal/ mid inferolateral and basal/mid anterolateral walls suggestive of early fibrotic changes due to possible myocarditis or sarcoidosis. \par Pt admitted with PNA at time of dx of HFrEF so systolic heart failure possible due to myocarditis. Unlikely sarcoidosis given no other clinicals markers of sarcoid seen. Morbid obesity as well as SHONA are possible etiologies of HFrEF as well. \par Will repeat cardiac MRI in 6 months, if still with low EF and similar images will opt for cardiac biopsy at this time. In the meantime, stressed importance of weight loss and continual use of CPAP. Discussed also possibility of bariatric surgery. \par Cont  ASA/statin/entresto/metoprolol/torsemide\par \par FU 6 months

## 2021-02-09 NOTE — PHYSICAL EXAM
[General Appearance - Well Developed] : well developed [Normal Appearance] : normal appearance [Well Groomed] : well groomed [General Appearance - Well Nourished] : well nourished [No Deformities] : no deformities [Normal Conjunctiva] : the conjunctiva exhibited no abnormalities [General Appearance - In No Acute Distress] : no acute distress [Eyelids - No Xanthelasma] : the eyelids demonstrated no xanthelasmas [Normal Oral Mucosa] : normal oral mucosa [No Oral Pallor] : no oral pallor [No Oral Cyanosis] : no oral cyanosis [Normal Jugular Venous A Waves Present] : normal jugular venous A waves present [No Jugular Venous Diehl A Waves] : no jugular venous diehl A waves [Normal Jugular Venous V Waves Present] : normal jugular venous V waves present [Respiration, Rhythm And Depth] : normal respiratory rhythm and effort [Exaggerated Use Of Accessory Muscles For Inspiration] : no accessory muscle use [Auscultation Breath Sounds / Voice Sounds] : lungs were clear to auscultation bilaterally [Heart Rate And Rhythm] : heart rate and rhythm were normal [Heart Sounds] : normal S1 and S2 [Murmurs] : no murmurs present [Abdomen Soft] : soft [Abdomen Tenderness] : non-tender [Abdomen Mass (___ Cm)] : no abdominal mass palpated [Abnormal Walk] : normal gait [Gait - Sufficient For Exercise Testing] : the gait was sufficient for exercise testing [Nail Clubbing] : no clubbing of the fingernails [Cyanosis, Localized] : no localized cyanosis [Petechial Hemorrhages (___cm)] : no petechial hemorrhages [Skin Color & Pigmentation] : normal skin color and pigmentation [] : no rash [No Venous Stasis] : no venous stasis [Skin Lesions] : no skin lesions [No Skin Ulcers] : no skin ulcer [No Xanthoma] : no  xanthoma was observed [Oriented To Time, Place, And Person] : oriented to person, place, and time [Affect] : the affect was normal [Mood] : the mood was normal [No Anxiety] : not feeling anxious

## 2021-02-09 NOTE — HISTORY OF PRESENT ILLNESS
[FreeTextEntry1] : Patient is 50 year old obese male with HTN, chronic LE edema, SHONA on CPAP, recently diagnosed with new NICM/systolic heart failure EF 35 seen today for follow up of cardiac MRI results.Feels well  has been compliant with all meds and with CPAP. With increased exercise tolerance, able to walk > 30 mins everyday and climb 2 flight of stairs without SOB/fatigue. He reports no LE edema, CP. No dizziness, palpitations. No LOC.\par \par

## 2021-03-16 ENCOUNTER — RX RENEWAL (OUTPATIENT)
Age: 51
End: 2021-03-16

## 2021-03-23 ENCOUNTER — RX CHANGE (OUTPATIENT)
Age: 51
End: 2021-03-23

## 2021-04-13 RX ORDER — ASPIRIN 81 MG/1
81 TABLET, COATED ORAL
Qty: 30 | Refills: 3 | Status: ACTIVE | COMMUNITY
Start: 2021-04-13 | End: 1900-01-01

## 2021-09-21 ENCOUNTER — APPOINTMENT (OUTPATIENT)
Dept: HEART AND VASCULAR | Facility: CLINIC | Age: 51
End: 2021-09-21
Payer: SELF-PAY

## 2021-09-21 ENCOUNTER — NON-APPOINTMENT (OUTPATIENT)
Age: 51
End: 2021-09-21

## 2021-09-21 VITALS
HEIGHT: 68 IN | OXYGEN SATURATION: 97 % | WEIGHT: 315 LBS | SYSTOLIC BLOOD PRESSURE: 136 MMHG | DIASTOLIC BLOOD PRESSURE: 70 MMHG | BODY MASS INDEX: 47.74 KG/M2 | HEART RATE: 95 BPM

## 2021-09-21 VITALS — TEMPERATURE: 98 F

## 2021-09-21 PROCEDURE — 99214 OFFICE O/P EST MOD 30 MIN: CPT

## 2021-09-21 PROCEDURE — 93000 ELECTROCARDIOGRAM COMPLETE: CPT

## 2021-09-22 ENCOUNTER — TRANSCRIPTION ENCOUNTER (OUTPATIENT)
Age: 51
End: 2021-09-22

## 2021-09-22 NOTE — HISTORY OF PRESENT ILLNESS
[FreeTextEntry1] : Patient is 51 year old obese male with HTN, chronic LE edema, SHONA on CPAP, recently diagnosed with new NICM/systolic heart failure EF 35 seen today for follow up. Has gained ~ 10 lbs over last 6 months due to sedentary state. Also of note, no longer able to use CPAP due to cost. When active, walking 20 mins, pt denies active CP/SOB/dizziness/palpitations/excessive fatigue. No LOC\par \par

## 2021-09-22 NOTE — DISCUSSION/SUMMARY
[FreeTextEntry1] : Assessment/Plan:\par \par Patient is 51 year old obese male with HTN, chronic LE edema, SHONA on CPAP, recently diagnosed with new NICM/systolic heart failure EF 35 seen today for follow up. Has gained ~ 10 lbs over last 6 months due to sedentary state. Also of note, no longer able to use CPAP due to cost. When active, walking 20 mins, pt denies active CP/SOB/dizziness/palpitations/excessive fatigue. No LOC\par \par -HFrEF/NICM: Cardiac mri 2/21: EF 32 with dilated LV. Decreased enhancement in basal/ mid inferolateral and basal/mid anterolateral walls suggestive of early fibrotic changes due to possible myocarditis or sarcoidosis. \par Pt admitted with PNA at time of dx of HFrEF so systolic heart failure possible due to myocarditis. Unlikely sarcoidosis given no other clinicals markers of sarcoid seen. Morbid obesity as well as SHONA are possible etiologies of HFrEF as well. \par Will repeat cardiac MRI in 6 months, if still with low EF and similar images will opt for cardiac biopsy at that time. In the meantime, stressed importance of weight loss and continual use of CPAP. Discussed also possibility of bariatric surgery. \par -Remains euvolemic on exam . Cont ASA 81 mg qd, atorva 20 mg qd, entresto 49/51 mg bid, toprol 25 mg qd, torsemide 20 mg ( 2 tabs qd)\par -Cardiac status stable\par \par FU 6 months \par \par \par

## 2021-10-06 PROBLEM — I10 ESSENTIAL HYPERTENSION: Status: ACTIVE | Noted: 2020-09-15

## 2021-11-19 ENCOUNTER — TRANSCRIPTION ENCOUNTER (OUTPATIENT)
Age: 51
End: 2021-11-19

## 2022-09-21 ENCOUNTER — RX RENEWAL (OUTPATIENT)
Age: 52
End: 2022-09-21

## 2022-09-26 ENCOUNTER — RX RENEWAL (OUTPATIENT)
Age: 52
End: 2022-09-26

## 2022-09-26 RX ORDER — METOPROLOL SUCCINATE 25 MG/1
25 TABLET, EXTENDED RELEASE ORAL
Qty: 90 | Refills: 3 | Status: ACTIVE | COMMUNITY
Start: 2020-09-15 | End: 1900-01-01

## 2023-01-10 ENCOUNTER — NON-APPOINTMENT (OUTPATIENT)
Age: 53
End: 2023-01-10

## 2023-01-10 ENCOUNTER — APPOINTMENT (OUTPATIENT)
Dept: HEART AND VASCULAR | Facility: CLINIC | Age: 53
End: 2023-01-10
Payer: COMMERCIAL

## 2023-01-10 VITALS
OXYGEN SATURATION: 95 % | DIASTOLIC BLOOD PRESSURE: 86 MMHG | WEIGHT: 315 LBS | BODY MASS INDEX: 47.74 KG/M2 | HEART RATE: 59 BPM | TEMPERATURE: 97.3 F | HEIGHT: 68 IN | SYSTOLIC BLOOD PRESSURE: 163 MMHG

## 2023-01-10 VITALS — SYSTOLIC BLOOD PRESSURE: 142 MMHG | DIASTOLIC BLOOD PRESSURE: 83 MMHG

## 2023-01-10 PROCEDURE — 99214 OFFICE O/P EST MOD 30 MIN: CPT | Mod: 25

## 2023-01-10 PROCEDURE — 93000 ELECTROCARDIOGRAM COMPLETE: CPT

## 2023-01-10 RX ORDER — SACUBITRIL AND VALSARTAN 49; 51 MG/1; MG/1
49-51 TABLET, FILM COATED ORAL TWICE DAILY
Qty: 60 | Refills: 0 | Status: DISCONTINUED | COMMUNITY
Start: 2020-09-15 | End: 2023-01-10

## 2023-01-10 RX ORDER — SACUBITRIL AND VALSARTAN 97; 103 MG/1; MG/1
97-103 TABLET, FILM COATED ORAL TWICE DAILY
Qty: 60 | Refills: 5 | Status: ACTIVE | COMMUNITY
Start: 2023-01-10 | End: 1900-01-01

## 2023-01-12 NOTE — HISTORY OF PRESENT ILLNESS
[FreeTextEntry1] : 52 year old obese male with HTN, chronic LE edema, SHONA on CPAP,  NICM/HFrEF EF 35%  seen today for follow up. Was supposed to have repeat cMRI but has not been seen ~ 1.5 years. States he has been in his usual state of health, can walk about 20 mis without symptoms. Reports no exertional SOB, orthopnea, LE edema or fatigue.\par \par

## 2023-01-12 NOTE — DISCUSSION/SUMMARY
After a laparoscopic inguinal hernia repair, my assistant Lawrence Zuleta PA-C, noticed an unusual lesion on the shaft of the patient's penis.  He thinks this should be referred to dermatology for evaluation.  I placed a referral today and will discuss this with the patient when he follows up.    Mckinley Colindres MD    [FreeTextEntry1] : Assessment/Plan:\par 52 year old obese male with HTN, chronic LE edema, SHONA on CPAP,  NICM/HFrEF EF 35% diagnosed in 2020 in setting of PNA seen today for follow up. Was supposed to have repeat cMRI but has not been seen ~ 1.5 years. States he has been in his usual state of health, can walk about 20 mins without symptoms. Reports no exertional SOB, orthopnea, LE edema or fatigue.\par \par -NYHA I, stage B HF: Nonischemic, dilated CM with known EF 35%. Remains euvolemic on exam with good functional capacity. Etiology of NICM unclear: possibly primary idiopathic dilated CM vs secondary to obesity vs prior myocarditis in setting of PNA vs ?sarcoid on cMRI but not conclusive & pt without extra cardiac manifestation. Plan to repeat cMRI -re-eval EF. If unchanged EF will need genetic testing/ICD. Increase entresto 97/103 bid, cont toprol 25 mg qd, torsemide 40 mg bid.\par \par fu cMRI\par

## 2023-03-28 RX ORDER — TORSEMIDE 20 MG/1
20 TABLET ORAL
Qty: 60 | Refills: 5 | Status: ACTIVE | COMMUNITY
Start: 2020-09-15 | End: 1900-01-01

## 2023-09-25 ENCOUNTER — APPOINTMENT (OUTPATIENT)
Dept: HEART AND VASCULAR | Facility: CLINIC | Age: 53
End: 2023-09-25

## 2024-11-24 NOTE — DISCHARGE NOTE PROVIDER - CARE PROVIDER_API CALL
Juanita Alberto  [address]  Phone: [phone]  Fax: (   )    -  Follow Up Time: 1 week Fever Juanita Alberto  [address]  Phone: [phone]  Fax: (   )    -  Follow Up Time: 1 week    Desiree Herbert  CRITICAL CARE MEDICINE  [address] [place] [address]  Phone: [phone]  Fax: [phone]  Follow Up Time: